# Patient Record
Sex: FEMALE | Race: WHITE | NOT HISPANIC OR LATINO | Employment: OTHER | URBAN - METROPOLITAN AREA
[De-identification: names, ages, dates, MRNs, and addresses within clinical notes are randomized per-mention and may not be internally consistent; named-entity substitution may affect disease eponyms.]

---

## 2017-03-06 DIAGNOSIS — Z88.9 HX OF SEASONAL ALLERGIES: ICD-10-CM

## 2017-03-06 RX ORDER — FLUTICASONE PROPIONATE 50 MCG
SPRAY, SUSPENSION (ML) NASAL
Qty: 16 G | Refills: 1 | Status: SHIPPED | OUTPATIENT
Start: 2017-03-06 | End: 2017-05-08 | Stop reason: SDUPTHER

## 2017-05-03 ENCOUNTER — OFFICE VISIT (OUTPATIENT)
Dept: INTERNAL MEDICINE | Age: 82
End: 2017-05-03

## 2017-05-03 VITALS
BODY MASS INDEX: 17.24 KG/M2 | HEIGHT: 64 IN | RESPIRATION RATE: 12 BRPM | SYSTOLIC BLOOD PRESSURE: 135 MMHG | WEIGHT: 101 LBS | HEART RATE: 70 BPM | DIASTOLIC BLOOD PRESSURE: 60 MMHG

## 2017-05-03 DIAGNOSIS — R09.89 BILATERAL CAROTID BRUITS: ICD-10-CM

## 2017-05-03 DIAGNOSIS — I10 ESSENTIAL HYPERTENSION: Primary | ICD-10-CM

## 2017-05-03 PROCEDURE — 99214 OFFICE O/P EST MOD 30 MIN: CPT | Performed by: INTERNAL MEDICINE

## 2017-05-04 LAB
ALBUMIN SERPL-MCNC: 4.5 G/DL (ref 3.5–5.2)
ALBUMIN/GLOB SERPL: 1.8 G/DL
ALP SERPL-CCNC: 68 U/L (ref 39–117)
ALT SERPL-CCNC: 24 U/L (ref 1–33)
AST SERPL-CCNC: 27 U/L (ref 1–32)
BILIRUB SERPL-MCNC: 0.5 MG/DL (ref 0.1–1.2)
BUN SERPL-MCNC: 21 MG/DL (ref 8–23)
BUN/CREAT SERPL: 27.6 (ref 7–25)
CALCIUM SERPL-MCNC: 9.4 MG/DL (ref 8.6–10.5)
CHLORIDE SERPL-SCNC: 92 MMOL/L (ref 98–107)
CHOLEST SERPL-MCNC: 204 MG/DL (ref 0–200)
CO2 SERPL-SCNC: 26.7 MMOL/L (ref 22–29)
CREAT SERPL-MCNC: 0.76 MG/DL (ref 0.57–1)
GLOBULIN SER CALC-MCNC: 2.5 GM/DL
GLUCOSE SERPL-MCNC: 116 MG/DL (ref 65–99)
HDLC SERPL-MCNC: 157 MG/DL (ref 40–60)
LDLC SERPL CALC-MCNC: 40 MG/DL (ref 0–100)
POTASSIUM SERPL-SCNC: 4.7 MMOL/L (ref 3.5–5.2)
PROT SERPL-MCNC: 7 G/DL (ref 6–8.5)
SODIUM SERPL-SCNC: 131 MMOL/L (ref 136–145)
TRIGL SERPL-MCNC: 37 MG/DL (ref 0–150)
VLDLC SERPL CALC-MCNC: 7.4 MG/DL (ref 5–40)

## 2017-05-08 DIAGNOSIS — R06.09 DYSPNEA ON EXERTION: ICD-10-CM

## 2017-05-08 DIAGNOSIS — I10 HTN (HYPERTENSION), BENIGN: ICD-10-CM

## 2017-05-08 DIAGNOSIS — Z88.9 HX OF SEASONAL ALLERGIES: ICD-10-CM

## 2017-05-08 RX ORDER — MONTELUKAST SODIUM 10 MG/1
TABLET ORAL
Qty: 30 TABLET | Refills: 4 | Status: SHIPPED | OUTPATIENT
Start: 2017-05-08 | End: 2017-10-23 | Stop reason: SDUPTHER

## 2017-05-08 RX ORDER — AMLODIPINE BESYLATE 5 MG/1
TABLET ORAL
Qty: 30 TABLET | Refills: 4 | Status: SHIPPED | OUTPATIENT
Start: 2017-05-08 | End: 2017-10-23 | Stop reason: SDUPTHER

## 2017-05-08 RX ORDER — LISINOPRIL 20 MG/1
TABLET ORAL
Qty: 30 TABLET | Refills: 4 | Status: SHIPPED | OUTPATIENT
Start: 2017-05-08 | End: 2017-10-23 | Stop reason: SDUPTHER

## 2017-05-08 RX ORDER — FLUTICASONE PROPIONATE 50 MCG
SPRAY, SUSPENSION (ML) NASAL
Qty: 1 BOTTLE | Refills: 4 | Status: SHIPPED | OUTPATIENT
Start: 2017-05-08 | End: 2017-10-23 | Stop reason: SDUPTHER

## 2017-05-11 ENCOUNTER — HOSPITAL ENCOUNTER (OUTPATIENT)
Dept: CARDIOLOGY | Facility: HOSPITAL | Age: 82
Discharge: HOME | End: 2017-05-11
Admitting: INTERNAL MEDICINE

## 2017-05-11 LAB
BH CV XLRA MEAS LEFT BULB EDV: -19 CM/SEC
BH CV XLRA MEAS LEFT BULB PSV: -57.8 CM/SEC
BH CV XLRA MEAS LEFT DIST CCA EDV: 18 CM/SEC
BH CV XLRA MEAS LEFT DIST CCA PSV: 69.2 CM/SEC
BH CV XLRA MEAS LEFT DIST ICA EDV: 29.4 CM/SEC
BH CV XLRA MEAS LEFT DIST ICA PSV: 101 CM/SEC
BH CV XLRA MEAS LEFT ICA/CCA RATIO: 1.45
BH CV XLRA MEAS LEFT MID ICA EDV: -27.5 CM/SEC
BH CV XLRA MEAS LEFT MID ICA PSV: -91 CM/SEC
BH CV XLRA MEAS LEFT PROX CCA EDV: 14.2 CM/SEC
BH CV XLRA MEAS LEFT PROX CCA PSV: 72.1 CM/SEC
BH CV XLRA MEAS LEFT PROX ECA EDV: -12.3 CM/SEC
BH CV XLRA MEAS LEFT PROX ECA PSV: -77.8 CM/SEC
BH CV XLRA MEAS LEFT PROX ICA EDV: -24.7 CM/SEC
BH CV XLRA MEAS LEFT PROX ICA PSV: -79.7 CM/SEC
BH CV XLRA MEAS LEFT PROX SCLA PSV: 171 CM/SEC
BH CV XLRA MEAS LEFT VERTEBRAL A EDV: 11.4 CM/SEC
BH CV XLRA MEAS LEFT VERTEBRAL A PSV: 64.5 CM/SEC
BH CV XLRA MEAS RIGHT BULB EDV: -36 CM/SEC
BH CV XLRA MEAS RIGHT BULB PSV: -129 CM/SEC
BH CV XLRA MEAS RIGHT CCA RATIO VEL: -61.2 CM/SEC
BH CV XLRA MEAS RIGHT DIST CCA EDV: -11.9 CM/SEC
BH CV XLRA MEAS RIGHT DIST CCA PSV: -61.2 CM/SEC
BH CV XLRA MEAS RIGHT DIST ICA EDV: -22.5 CM/SEC
BH CV XLRA MEAS RIGHT DIST ICA PSV: -86.5 CM/SEC
BH CV XLRA MEAS RIGHT ICA RATIO VEL: -264 CM/SEC
BH CV XLRA MEAS RIGHT ICA/CCA RATIO: 4.3
BH CV XLRA MEAS RIGHT MID ICA EDV: -16.1 CM/SEC
BH CV XLRA MEAS RIGHT MID ICA PSV: -93.9 CM/SEC
BH CV XLRA MEAS RIGHT PROX CCA EDV: 10.4 CM/SEC
BH CV XLRA MEAS RIGHT PROX CCA PSV: 52.2 CM/SEC
BH CV XLRA MEAS RIGHT PROX ECA EDV: -9.9 CM/SEC
BH CV XLRA MEAS RIGHT PROX ECA PSV: -82.1 CM/SEC
BH CV XLRA MEAS RIGHT PROX ICA EDV: -61.6 CM/SEC
BH CV XLRA MEAS RIGHT PROX ICA PSV: -264 CM/SEC
BH CV XLRA MEAS RIGHT PROX SCLA PSV: 82.7 CM/SEC
BH CV XLRA MEAS RIGHT VERTEBRAL A EDV: 11.3 CM/SEC
BH CV XLRA MEAS RIGHT VERTEBRAL A PSV: 55.9 CM/SEC
LEFT ARM BP: NORMAL MMHG
RIGHT ARM BP: NORMAL MMHG

## 2017-05-11 PROCEDURE — 93880 EXTRACRANIAL BILAT STUDY: CPT

## 2017-05-12 ENCOUNTER — TELEPHONE (OUTPATIENT)
Dept: INTERNAL MEDICINE | Age: 82
End: 2017-05-12

## 2017-05-12 DIAGNOSIS — I65.21 STENOSIS OF RIGHT CAROTID ARTERY: Primary | ICD-10-CM

## 2017-06-07 ENCOUNTER — HOSPITAL ENCOUNTER (EMERGENCY)
Facility: HOSPITAL | Age: 82
Discharge: HOME | End: 2017-06-07
Attending: FAMILY MEDICINE | Admitting: FAMILY MEDICINE

## 2017-06-07 ENCOUNTER — APPOINTMENT (OUTPATIENT)
Dept: GENERAL RADIOLOGY | Facility: HOSPITAL | Age: 82
End: 2017-06-07

## 2017-06-07 VITALS
RESPIRATION RATE: 16 BRPM | SYSTOLIC BLOOD PRESSURE: 130 MMHG | DIASTOLIC BLOOD PRESSURE: 78 MMHG | HEIGHT: 64 IN | WEIGHT: 104 LBS | BODY MASS INDEX: 17.75 KG/M2 | TEMPERATURE: 98.5 F | OXYGEN SATURATION: 96 % | HEART RATE: 70 BPM

## 2017-06-07 DIAGNOSIS — R53.1 WEAKNESS: Primary | ICD-10-CM

## 2017-06-07 LAB
ALBUMIN SERPL-MCNC: 4.4 G/DL (ref 3.5–5.2)
ALBUMIN/GLOB SERPL: 1.6 G/DL
ALP SERPL-CCNC: 63 U/L (ref 39–117)
ALT SERPL W P-5'-P-CCNC: 31 U/L (ref 1–33)
ANION GAP SERPL CALCULATED.3IONS-SCNC: 14.8 MMOL/L
AST SERPL-CCNC: 29 U/L (ref 1–32)
BACTERIA UR QL AUTO: NORMAL /HPF
BASOPHILS # BLD AUTO: 0.02 10*3/MM3 (ref 0–0.2)
BASOPHILS NFR BLD AUTO: 0.2 % (ref 0–1.5)
BILIRUB SERPL-MCNC: 0.3 MG/DL (ref 0.1–1.2)
BILIRUB UR QL STRIP: NEGATIVE
BUN BLD-MCNC: 22 MG/DL (ref 8–23)
BUN/CREAT SERPL: 26.5 (ref 7–25)
CALCIUM SPEC-SCNC: 9.5 MG/DL (ref 8.6–10.5)
CHLORIDE SERPL-SCNC: 91 MMOL/L (ref 98–107)
CLARITY UR: CLEAR
CO2 SERPL-SCNC: 24.2 MMOL/L (ref 22–29)
COLOR UR: YELLOW
CREAT BLD-MCNC: 0.83 MG/DL (ref 0.57–1)
DEPRECATED RDW RBC AUTO: 44.6 FL (ref 37–54)
EOSINOPHIL # BLD AUTO: 0.06 10*3/MM3 (ref 0–0.7)
EOSINOPHIL NFR BLD AUTO: 0.7 % (ref 0.3–6.2)
ERYTHROCYTE [DISTWIDTH] IN BLOOD BY AUTOMATED COUNT: 12.8 % (ref 11.7–13)
GFR SERPL CREATININE-BSD FRML MDRD: 65 ML/MIN/1.73
GLOBULIN UR ELPH-MCNC: 2.8 GM/DL
GLUCOSE BLD-MCNC: 97 MG/DL (ref 65–99)
GLUCOSE UR STRIP-MCNC: NEGATIVE MG/DL
HCT VFR BLD AUTO: 40.8 % (ref 35.6–45.5)
HGB BLD-MCNC: 14.4 G/DL (ref 11.9–15.5)
HGB UR QL STRIP.AUTO: NEGATIVE
HOLD SPECIMEN: NORMAL
HYALINE CASTS UR QL AUTO: NORMAL /LPF
IMM GRANULOCYTES # BLD: 0 10*3/MM3 (ref 0–0.03)
IMM GRANULOCYTES NFR BLD: 0 % (ref 0–0.5)
KETONES UR QL STRIP: NEGATIVE
LEUKOCYTE ESTERASE UR QL STRIP.AUTO: ABNORMAL
LYMPHOCYTES # BLD AUTO: 2.12 10*3/MM3 (ref 0.9–4.8)
LYMPHOCYTES NFR BLD AUTO: 24.9 % (ref 19.6–45.3)
MAGNESIUM SERPL-MCNC: 2.4 MG/DL (ref 1.6–2.4)
MCH RBC QN AUTO: 34 PG (ref 26.9–32)
MCHC RBC AUTO-ENTMCNC: 35.3 G/DL (ref 32.4–36.3)
MCV RBC AUTO: 96.2 FL (ref 80.5–98.2)
MONOCYTES # BLD AUTO: 0.51 10*3/MM3 (ref 0.2–1.2)
MONOCYTES NFR BLD AUTO: 6 % (ref 5–12)
NEUTROPHILS # BLD AUTO: 5.82 10*3/MM3 (ref 1.9–8.1)
NEUTROPHILS NFR BLD AUTO: 68.2 % (ref 42.7–76)
NITRITE UR QL STRIP: NEGATIVE
PH UR STRIP.AUTO: 7 [PH] (ref 5–8)
PLATELET # BLD AUTO: 326 10*3/MM3 (ref 140–500)
PMV BLD AUTO: 9.4 FL (ref 6–12)
POTASSIUM BLD-SCNC: 4.4 MMOL/L (ref 3.5–5.2)
PROT SERPL-MCNC: 7.2 G/DL (ref 6–8.5)
PROT UR QL STRIP: NEGATIVE
RBC # BLD AUTO: 4.24 10*6/MM3 (ref 3.9–5.2)
RBC # UR: NORMAL /HPF
REF LAB TEST METHOD: NORMAL
SODIUM BLD-SCNC: 130 MMOL/L (ref 136–145)
SP GR UR STRIP: 1.01 (ref 1–1.03)
SQUAMOUS #/AREA URNS HPF: NORMAL /HPF
TROPONIN T SERPL-MCNC: <0.01 NG/ML (ref 0–0.03)
UROBILINOGEN UR QL STRIP: ABNORMAL
WBC NRBC COR # BLD: 8.53 10*3/MM3 (ref 4.5–10.7)
WBC UR QL AUTO: NORMAL /HPF
WHOLE BLOOD HOLD SPECIMEN: NORMAL

## 2017-06-07 PROCEDURE — 93010 ELECTROCARDIOGRAM REPORT: CPT | Performed by: INTERNAL MEDICINE

## 2017-06-07 PROCEDURE — 80053 COMPREHEN METABOLIC PANEL: CPT | Performed by: FAMILY MEDICINE

## 2017-06-07 PROCEDURE — 93005 ELECTROCARDIOGRAM TRACING: CPT | Performed by: FAMILY MEDICINE

## 2017-06-07 PROCEDURE — 71010 HC CHEST PA OR AP: CPT

## 2017-06-07 PROCEDURE — 87086 URINE CULTURE/COLONY COUNT: CPT | Performed by: FAMILY MEDICINE

## 2017-06-07 PROCEDURE — 36415 COLL VENOUS BLD VENIPUNCTURE: CPT | Performed by: FAMILY MEDICINE

## 2017-06-07 PROCEDURE — 85025 COMPLETE CBC W/AUTO DIFF WBC: CPT | Performed by: FAMILY MEDICINE

## 2017-06-07 PROCEDURE — 99284 EMERGENCY DEPT VISIT MOD MDM: CPT

## 2017-06-07 PROCEDURE — 83735 ASSAY OF MAGNESIUM: CPT | Performed by: FAMILY MEDICINE

## 2017-06-07 PROCEDURE — 81001 URINALYSIS AUTO W/SCOPE: CPT | Performed by: FAMILY MEDICINE

## 2017-06-07 PROCEDURE — 84484 ASSAY OF TROPONIN QUANT: CPT | Performed by: FAMILY MEDICINE

## 2017-06-07 RX ORDER — SODIUM CHLORIDE 0.9 % (FLUSH) 0.9 %
10 SYRINGE (ML) INJECTION AS NEEDED
Status: DISCONTINUED | OUTPATIENT
Start: 2017-06-07 | End: 2017-06-07 | Stop reason: HOSPADM

## 2017-06-07 NOTE — ED NOTES
Pt encouraged to provide urine specimen, states that she just used the restroom prior to coming back to exam room.     Rica Dowling RN  06/07/17 0153

## 2017-06-07 NOTE — ED TRIAGE NOTES
PAtient states she has a lack of energy for about 5 days now. She states that she just hasn't felt like herself.

## 2017-06-07 NOTE — ED PROVIDER NOTES
" EMERGENCY DEPARTMENT ENCOUNTER    CHIEF COMPLAINT  Chief Complaint: Fatigue  History given by: Patient  History limited by: Nothing  Room Number: 23/23  PMD: Clay Martínez MD      HPI:  Pt is a 87 y.o. female who presents to the ED complaining of gradually worsening fatigue over the past 2-3 days and she states that she \"has a lack of energy\". Patient has recently developed tinnitus and left ear discomfort three days ago but she denies any hearing loss, focal weakness, HA, numbness, tingling, chest pain, abdominal pain, acute SOA, diaphoresis, fever, chills, nausea, vomiting or diarrhea since onset, or other stroke like symptoms. Patient had a recent right ICA US showed significant blockage but she is being followed closely by  with vascular. No other complaints at this time.      Duration:  2-3 days  Onset: Gradual  Timing: Constant  Location: Generalized  Radiation: None  Quality: Fatigued  Intensity/Severity: Mild  Progression: No Change  Associated Symptoms: Fatigue, tinnitus, ear discomfort  Aggravating Factors: Exertion  Alleviating Factors: Rest  Previous Episodes: None  Treatment before arrival: None mentioned    PAST MEDICAL HISTORY  Active Ambulatory Problems     Diagnosis Date Noted   • Breathlessness on exertion 01/17/2016   • BP (high blood pressure) 01/17/2016   • Anxiety 01/24/2016   • Long-term use of high-risk medication 04/13/2016   • Seasonal allergic rhinitis due to pollen 11/02/2016     Resolved Ambulatory Problems     Diagnosis Date Noted   • No Resolved Ambulatory Problems     Past Medical History:   Diagnosis Date   • Arthritis    • Hyperlipidemia    • Hypertension    • Methicillin resistant Staphylococcus aureus infection        PAST SURGICAL HISTORY  Past Surgical History:   Procedure Laterality Date   • ORIF PROXIMAL HUMERUS FRACTURE         FAMILY HISTORY  Family History   Problem Relation Age of Onset   • Stroke Mother    • Heart valve disorder Father    • Cancer Other      " sibling       SOCIAL HISTORY  Social History     Social History   • Marital status:      Spouse name: N/A   • Number of children: N/A   • Years of education: N/A     Occupational History   • Not on file.     Social History Main Topics   • Smoking status: Light Tobacco Smoker     Types: Cigarettes   • Smokeless tobacco: Never Used   • Alcohol use Yes      Comment: wine    • Drug use: Not on file   • Sexual activity: Not on file     Other Topics Concern   • Not on file     Social History Narrative   • No narrative on file       ALLERGIES  Codeine; Penicillins; and Sulfa antibiotics    REVIEW OF SYSTEMS  Review of Systems   Constitutional: Positive for fatigue. Negative for chills.   HENT: Positive for ear pain (Left) and tinnitus. Negative for congestion, hearing loss, rhinorrhea and sore throat.    Eyes: Negative for pain.   Respiratory: Negative for cough and shortness of breath.    Cardiovascular: Negative for chest pain, palpitations and leg swelling.   Gastrointestinal: Negative for abdominal pain, diarrhea, nausea and vomiting.   Genitourinary: Negative for difficulty urinating, dysuria, flank pain and frequency.   Musculoskeletal: Negative for myalgias, neck pain and neck stiffness.   Skin: Negative for rash.   Neurological: Negative for dizziness, speech difficulty, weakness, light-headedness, numbness and headaches.   Psychiatric/Behavioral: Negative.    All other systems reviewed and are negative.      PHYSICAL EXAM  ED Triage Vitals   Temp Heart Rate Resp BP SpO2   06/07/17 1442 06/07/17 1442 06/07/17 1442 06/07/17 1526 06/07/17 1442   98.5 °F (36.9 °C) 78 15 171/80 100 %      Temp src Heart Rate Source Patient Position BP Location FiO2 (%)   06/07/17 1442 -- -- -- --   Tympanic           Physical Exam   Constitutional: She is oriented to person, place, and time and well-developed, well-nourished, and in no distress. No distress.   HENT:   Head: Normocephalic.   Eyes: EOM are normal. Pupils are  equal, round, and reactive to light.   Neck: Normal range of motion.   Cardiovascular: Normal rate and regular rhythm.    No murmur heard.  Pulmonary/Chest: Effort normal and breath sounds normal. No respiratory distress.   Abdominal: Soft. There is no tenderness. There is no rebound and no guarding.   Musculoskeletal: Normal range of motion. She exhibits no edema.   Neurological: She is alert and oriented to person, place, and time.   Skin: Skin is warm and dry. No rash noted.   Psychiatric: Mood and affect normal.   Nursing note and vitals reviewed.      LAB RESULTS  Lab Results (last 24 hours)     Procedure Component Value Units Date/Time    CBC & Differential [[id number]] Collected:  06/07/17 1538    Specimen:  Blood Updated:  06/07/17 1609    Narrative:       The following orders were created for panel order CBC & Differential.  Procedure                               Abnormality         Status                     ---------                               -----------         ------                     CBC Auto Differential[[id number]]        Abnormal            Final result                 Please view results for these tests on the individual orders.    Comprehensive Metabolic Panel [[id number]]  (Abnormal) Collected:  06/07/17 1538    Specimen:  Blood Updated:  06/07/17 1635     Glucose 97 mg/dL      BUN 22 mg/dL      Creatinine 0.83 mg/dL      Sodium 130 (L) mmol/L      Potassium 4.4 mmol/L      Chloride 91 (L) mmol/L      CO2 24.2 mmol/L      Calcium 9.5 mg/dL      Total Protein 7.2 g/dL      Albumin 4.40 g/dL      ALT (SGPT) 31 U/L      AST (SGOT) 29 U/L      Alkaline Phosphatase 63 U/L      Total Bilirubin 0.3 mg/dL      eGFR Non African Amer 65 mL/min/1.73      Globulin 2.8 gm/dL      A/G Ratio 1.6 g/dL      BUN/Creatinine Ratio 26.5 (H)     Anion Gap 14.8 mmol/L     Narrative:       The MDRD GFR formula is only valid for adults with stable renal function between ages 18 and 70.    Troponin [[id number]]   (Normal) Collected:  06/07/17 1538    Specimen:  Blood Updated:  06/07/17 1635     Troponin T <0.010 ng/mL     Narrative:       Troponin T Reference Ranges:  Less than 0.03 ng/mL:    Negative for AMI  0.03 to 0.09 ng/mL:      Indeterminant for AMI  Greater than 0.09 ng/mL: Positive for AMI    Magnesium [[id number]]  (Normal) Collected:  06/07/17 1538    Specimen:  Blood Updated:  06/07/17 1635     Magnesium 2.4 mg/dL     CBC Auto Differential [[id number]]  (Abnormal) Collected:  06/07/17 1538    Specimen:  Blood Updated:  06/07/17 1609     WBC 8.53 10*3/mm3      RBC 4.24 10*6/mm3      Hemoglobin 14.4 g/dL      Hematocrit 40.8 %      MCV 96.2 fL      MCH 34.0 (H) pg      MCHC 35.3 g/dL      RDW 12.8 %      RDW-SD 44.6 fl      MPV 9.4 fL      Platelets 326 10*3/mm3      Neutrophil % 68.2 %      Lymphocyte % 24.9 %      Monocyte % 6.0 %      Eosinophil % 0.7 %      Basophil % 0.2 %      Immature Grans % 0.0 %      Neutrophils, Absolute 5.82 10*3/mm3      Lymphocytes, Absolute 2.12 10*3/mm3      Monocytes, Absolute 0.51 10*3/mm3      Eosinophils, Absolute 0.06 10*3/mm3      Basophils, Absolute 0.02 10*3/mm3      Immature Grans, Absolute 0.00 10*3/mm3     Urinalysis With / Culture If Indicated [[id number]]  (Abnormal) Collected:  06/07/17 1809    Specimen:  Urine from Urine, Clean Catch Updated:  06/07/17 1836     Color, UA Yellow     Appearance, UA Clear     pH, UA 7.0     Specific Gravity, UA 1.010     Glucose, UA Negative     Ketones, UA Negative     Bilirubin, UA Negative     Blood, UA Negative     Protein, UA Negative     Leuk Esterase, UA Small (1+) (A)     Nitrite, UA Negative     Urobilinogen, UA 0.2 E.U./dL    Urinalysis, Microscopic Only [[id number]] Collected:  06/07/17 1809    Specimen:  Urine from Urine, Clean Catch Updated:  06/07/17 1836     RBC, UA 0-2 /HPF      WBC, UA 0-2 /HPF      Bacteria, UA None Seen /HPF      Squamous Epithelial Cells, UA 0-2 /HPF      Hyaline Casts, UA 0-2 /LPF      Methodology  Automated Microscopy    Urine Culture [[id number]] Collected:  06/07/17 1809    Specimen:  Urine from Urine, Clean Catch Updated:  06/07/17 1832          I ordered the above labs and reviewed the results    RADIOLOGY  XR Chest 1 View   Final Result   No focal pulmonary consolidation. COPD change. Follow-up as   clinical indications persist.       This report was finalized on 6/7/2017 5:35 PM by Dr. Clay Blum MD.             I ordered the above noted radiological studies. Interpreted by radiologist. Reviewed by me in PACS.       PROCEDURES  Procedures    EKG           EKG time: 17:00  Rhythm/Rate: NSR 75  P waves and WI: Normal  QRS, axis: IVCD  ST and T waves: Non specific ST-T wave changes        Interpreted Contemporaneously by me, independently viewed  Unchanged compared to prior 6/15/10      PROGRESS AND CONSULTS  ED Course     15:27  Ordered EKG, labs and CXR for further evaluation.     19:26  Rechecked patient and they are resting comfortably. Discussed the patient's pertinent labs and imaging results, including CXR, EKG and labs all show nothing acute. Discussed plan to discharge the patient home and recommended f/u with her PCP as directed. Patient agrees with the plan and all questions were addressed.     Latest vital signs   BP- 157/87 HR- 84 Temp- 98.5 °F (36.9 °C) (Tympanic) O2 sat- 97%\      MEDICAL DECISION MAKING  Results were reviewed/discussed with the patient and they were also made aware of online access. Pt also made aware that some labs, such as cultures, will not be resulted during ER visit and follow up with PMD is necessary.     MDM  Number of Diagnoses or Management Options     Amount and/or Complexity of Data Reviewed  Clinical lab tests: reviewed and ordered (Troponin <0.010)  Tests in the radiology section of CPT®: reviewed and ordered (CXR - No focal pulmonary consolidation. COPD change. Follow-up as clinical indications persist.)  Tests in the medicine section of CPT®: ordered  and reviewed (See EKG)  Decide to obtain previous medical records or to obtain history from someone other than the patient: yes  Review and summarize past medical records: yes (Review of Doppler right proximal ICA 70-79% stenosis. )    Patient Progress  Patient progress: stable         DIAGNOSIS  Final diagnoses:   Weakness       DISPOSITION  DISCHARGE    Patient discharged in stable condition.    Reviewed implications of results, diagnosis, meds, responsibility to follow up, warning signs and symptoms of possible worsening, potential complications and reasons to return to ER, including worsening fatigue.     Patient/Family voiced understanding of above instructions.    Discussed plan for discharge, as there is no emergent indication for admission.  Pt/family is agreeable and understands need for follow up and repeat testing.  Pt is aware that discharge does not mean that nothing is wrong but it indicates no emergency is present that requires admission and they must continue care with follow-up as given below or physician of their choice.     FOLLOW-UP  lCay Martínez MD  [address] [id number] [phone]      As needed    Gurjit Ramirez MD  [address] [address]  [phone]      Keep your appt with Dr Ramirez         Medication List      Notice     No changes were made to your prescriptions during this visit.            Latest Documented Vital Signs:  As of 7:27 PM  BP- 157/87 HR- 84 Temp- 98.5 °F (36.9 °C) (Tympanic) O2 sat- 97%    --  Documentation assistance provided by racquel Rosas for .  Information recorded by the scribshira was done at my direction and has been verified and validated by me.        Melecio Rosas  06/07/17 1928       Abhinav Jessica MD  06/07/17 2050

## 2017-06-09 ENCOUNTER — TELEPHONE (OUTPATIENT)
Dept: SOCIAL WORK | Facility: HOSPITAL | Age: 82
End: 2017-06-09

## 2017-06-09 LAB — BACTERIA SPEC AEROBE CULT: NORMAL

## 2017-06-15 ENCOUNTER — TELEPHONE (OUTPATIENT)
Dept: INTERNAL MEDICINE | Age: 82
End: 2017-06-15

## 2017-06-15 NOTE — TELEPHONE ENCOUNTER
Pt is requesting who  recommends to use for hearing loss. Pt is having difficulty hearing out her LT ear.    Pls call pt #[phone].

## 2017-07-03 ENCOUNTER — TELEPHONE (OUTPATIENT)
Dept: INTERNAL MEDICINE | Age: 82
End: 2017-07-03

## 2017-07-03 DIAGNOSIS — H91.90 HEARING LOSS, UNSPECIFIED LATERALITY: Primary | ICD-10-CM

## 2017-07-03 NOTE — TELEPHONE ENCOUNTER
Pt called to check on a referral to an ENT for hearing loss, but the message from 6-19-17 never had a referral placed so pt could be scheduled.  Pt requested a referral to Dr. Li/ ENT  Thanks SP

## 2017-10-23 DIAGNOSIS — Z88.9 HX OF SEASONAL ALLERGIES: ICD-10-CM

## 2017-10-23 DIAGNOSIS — R06.09 DYSPNEA ON EXERTION: ICD-10-CM

## 2017-10-23 DIAGNOSIS — I10 HTN (HYPERTENSION), BENIGN: ICD-10-CM

## 2017-10-24 RX ORDER — LISINOPRIL 20 MG/1
TABLET ORAL
Qty: 30 TABLET | Refills: 3 | Status: SHIPPED | OUTPATIENT
Start: 2017-10-24 | End: 2018-02-19 | Stop reason: SDUPTHER

## 2017-10-24 RX ORDER — AMLODIPINE BESYLATE 5 MG/1
TABLET ORAL
Qty: 30 TABLET | Refills: 3 | Status: SHIPPED | OUTPATIENT
Start: 2017-10-24 | End: 2018-02-19 | Stop reason: SDUPTHER

## 2017-10-24 RX ORDER — MONTELUKAST SODIUM 10 MG/1
TABLET ORAL
Qty: 30 TABLET | Refills: 3 | Status: SHIPPED | OUTPATIENT
Start: 2017-10-24 | End: 2018-02-19 | Stop reason: SDUPTHER

## 2017-10-24 RX ORDER — FLUTICASONE PROPIONATE 50 MCG
SPRAY, SUSPENSION (ML) NASAL
Qty: 16 ML | Refills: 3 | Status: SHIPPED | OUTPATIENT
Start: 2017-10-24 | End: 2018-02-19 | Stop reason: SDUPTHER

## 2017-11-15 ENCOUNTER — OFFICE VISIT (OUTPATIENT)
Dept: INTERNAL MEDICINE | Age: 82
End: 2017-11-15

## 2017-11-15 VITALS
HEART RATE: 82 BPM | RESPIRATION RATE: 12 BRPM | BODY MASS INDEX: 17.75 KG/M2 | HEIGHT: 64 IN | SYSTOLIC BLOOD PRESSURE: 160 MMHG | TEMPERATURE: 97.6 F | WEIGHT: 104 LBS | DIASTOLIC BLOOD PRESSURE: 80 MMHG | OXYGEN SATURATION: 96 %

## 2017-11-15 DIAGNOSIS — E78.2 MIXED HYPERLIPIDEMIA: ICD-10-CM

## 2017-11-15 DIAGNOSIS — I10 ESSENTIAL HYPERTENSION: ICD-10-CM

## 2017-11-15 PROBLEM — I65.23 CAROTID STENOSIS, ASYMPTOMATIC, BILATERAL: Status: ACTIVE | Noted: 2017-11-15

## 2017-11-15 PROCEDURE — 99214 OFFICE O/P EST MOD 30 MIN: CPT | Performed by: INTERNAL MEDICINE

## 2017-11-15 RX ORDER — ASPIRIN 81 MG/1
81 TABLET ORAL DAILY
COMMUNITY

## 2017-11-15 NOTE — PROGRESS NOTES
Sharonda Ridley is a 87 y.o. female who presents with   Chief Complaint   Patient presents with   • Hypertension     Checkup; lab updates.  Blood pressure at home 130-145/60-75   • Hyperlipidemia     Checkup; lab updates.   .    Hypertension   This is a chronic problem. The current episode started more than 1 year ago. The problem is controlled. Past treatments include ACE inhibitors and calcium channel blockers. The current treatment provides moderate improvement. There are no compliance problems.    Hyperlipidemia   This is a chronic problem. The current episode started more than 1 year ago. The problem is controlled. Recent lipid tests were reviewed and are high. She is currently on no antihyperlipidemic treatment.        The following portions of the patient's history were reviewed and updated as appropriate: allergies, current medications, past medical history and problem list.    Review of Systems   Constitutional: Negative.    HENT: Negative.    Eyes: Negative.    Respiratory: Negative.    Cardiovascular: Negative.    Genitourinary: Negative.    Musculoskeletal: Negative.    Skin: Negative.    Neurological: Negative.    Psychiatric/Behavioral: Negative.        Objective   Physical Exam   Constitutional: She is oriented to person, place, and time. She appears well-developed and well-nourished. No distress.   HENT:   Head: Normocephalic and atraumatic.   Eyes: Conjunctivae and EOM are normal. Pupils are equal, round, and reactive to light.   Neck: Normal range of motion. Neck supple. No thyromegaly present.   Bilateral carotid bruits are noted.  Patient does have a known history of bilateral carotid stenosis and sees  for vascular follow-up.  He has felt that aspirin 81 mg daily is the current best treatment for the issue.    Cardiovascular: Normal rate, regular rhythm, normal heart sounds and intact distal pulses.  Exam reveals no gallop and no friction rub.    No murmur heard.  Pulmonary/Chest:  Effort normal and breath sounds normal. No respiratory distress. She has no wheezes. She has no rales. She exhibits no tenderness.   Neurological: She is alert and oriented to person, place, and time.   Psychiatric: She has a normal mood and affect. Her behavior is normal. Judgment and thought content normal.   Nursing note and vitals reviewed.      Assessment/Plan   Sharonda was seen today for hypertension and hyperlipidemia.    Diagnoses and all orders for this visit:    Essential hypertension  -     Comprehensive Metabolic Panel    Mixed hyperlipidemia  -     Comprehensive Metabolic Panel  -     Lipid Panel          Plan: Labs as above.Today's exam unremarkable for any new problems or events.  Continue all current treatment as prescribed.  A follow-up checkup/lab update visit is advised for 4 months because of the elevation of the blood pressure on today's visit..    Patient declines to be tested for any thyroid abnormalities.

## 2017-11-16 LAB
ALBUMIN SERPL-MCNC: 4.5 G/DL (ref 3.5–5.2)
ALBUMIN/GLOB SERPL: 1.9 G/DL
ALP SERPL-CCNC: 65 U/L (ref 39–117)
ALT SERPL-CCNC: 26 U/L (ref 1–33)
AST SERPL-CCNC: 31 U/L (ref 1–32)
BILIRUB SERPL-MCNC: 0.4 MG/DL (ref 0.1–1.2)
BUN SERPL-MCNC: 21 MG/DL (ref 8–23)
BUN/CREAT SERPL: 29.6 (ref 7–25)
CALCIUM SERPL-MCNC: 9.6 MG/DL (ref 8.6–10.5)
CHLORIDE SERPL-SCNC: 91 MMOL/L (ref 98–107)
CHOLEST SERPL-MCNC: 194 MG/DL (ref 0–200)
CO2 SERPL-SCNC: 27.6 MMOL/L (ref 22–29)
CREAT SERPL-MCNC: 0.71 MG/DL (ref 0.57–1)
GFR SERPLBLD CREATININE-BSD FMLA CKD-EPI: 78 ML/MIN/1.73
GFR SERPLBLD CREATININE-BSD FMLA CKD-EPI: 94 ML/MIN/1.73
GLOBULIN SER CALC-MCNC: 2.4 GM/DL
GLUCOSE SERPL-MCNC: 111 MG/DL (ref 65–99)
HDLC SERPL-MCNC: 145 MG/DL (ref 40–60)
LDLC SERPL CALC-MCNC: 43 MG/DL (ref 0–100)
POTASSIUM SERPL-SCNC: 4.8 MMOL/L (ref 3.5–5.2)
PROT SERPL-MCNC: 6.9 G/DL (ref 6–8.5)
SODIUM SERPL-SCNC: 131 MMOL/L (ref 136–145)
TRIGL SERPL-MCNC: 32 MG/DL (ref 0–150)
VLDLC SERPL CALC-MCNC: 6.4 MG/DL (ref 5–40)

## 2018-02-19 DIAGNOSIS — R06.09 DYSPNEA ON EXERTION: ICD-10-CM

## 2018-02-19 DIAGNOSIS — Z88.9 HX OF SEASONAL ALLERGIES: ICD-10-CM

## 2018-02-19 DIAGNOSIS — I10 HTN (HYPERTENSION), BENIGN: ICD-10-CM

## 2018-02-19 RX ORDER — FLUTICASONE PROPIONATE 50 MCG
SPRAY, SUSPENSION (ML) NASAL
Qty: 16 G | Refills: 2 | Status: SHIPPED | OUTPATIENT
Start: 2018-02-19 | End: 2018-05-12 | Stop reason: SDUPTHER

## 2018-02-19 RX ORDER — AMLODIPINE BESYLATE 5 MG/1
TABLET ORAL
Qty: 30 TABLET | Refills: 2 | Status: SHIPPED | OUTPATIENT
Start: 2018-02-19 | End: 2018-05-12 | Stop reason: SDUPTHER

## 2018-02-19 RX ORDER — MONTELUKAST SODIUM 10 MG/1
TABLET ORAL
Qty: 30 TABLET | Refills: 2 | Status: SHIPPED | OUTPATIENT
Start: 2018-02-19 | End: 2018-05-12 | Stop reason: SDUPTHER

## 2018-02-19 RX ORDER — LISINOPRIL 20 MG/1
TABLET ORAL
Qty: 30 TABLET | Refills: 2 | Status: SHIPPED | OUTPATIENT
Start: 2018-02-19 | End: 2018-05-12 | Stop reason: SDUPTHER

## 2018-03-14 ENCOUNTER — OFFICE VISIT (OUTPATIENT)
Dept: INTERNAL MEDICINE | Age: 83
End: 2018-03-14

## 2018-03-14 VITALS
HEIGHT: 64 IN | DIASTOLIC BLOOD PRESSURE: 60 MMHG | TEMPERATURE: 97.7 F | HEART RATE: 70 BPM | OXYGEN SATURATION: 96 % | RESPIRATION RATE: 12 BRPM | WEIGHT: 107 LBS | SYSTOLIC BLOOD PRESSURE: 160 MMHG | BODY MASS INDEX: 18.27 KG/M2

## 2018-03-14 DIAGNOSIS — E78.2 MIXED HYPERLIPIDEMIA: ICD-10-CM

## 2018-03-14 DIAGNOSIS — I10 ESSENTIAL HYPERTENSION: Primary | ICD-10-CM

## 2018-03-14 DIAGNOSIS — R73.9 HYPERGLYCEMIA: ICD-10-CM

## 2018-03-14 PROCEDURE — 99214 OFFICE O/P EST MOD 30 MIN: CPT | Performed by: INTERNAL MEDICINE

## 2018-03-14 NOTE — PROGRESS NOTES
Sharonda Ridley is a 88 y.o. female who presents with   Chief Complaint   Patient presents with   • Hyperlipidemia   • Blood Sugar Problem   • Hypertension   .    88-year-old female presents for her routine checkup/lab update visit.  She is not fasting but is 2 hours postprandial.  Her blood pressures at home since her last visit in November have ranged between 130-140/60-70.      Hyperlipidemia   This is a chronic problem. The current episode started more than 1 year ago. The problem is controlled. Exacerbating diseases include diabetes. She is currently on no antihyperlipidemic treatment.   Blood Sugar Problem   This is a chronic problem. The current episode started more than 1 month ago. The problem has been waxing and waning. She has tried nothing for the symptoms.   Hypertension   This is a chronic problem. The current episode started more than 1 month ago. The problem is controlled. Past treatments include calcium channel blockers and ACE inhibitors. There are no compliance problems.  Current antihypertension treatment includes calcium channel blockers and ACE inhibitors.        The following portions of the patient's history were reviewed and updated as appropriate: allergies, current medications, past medical history and problem list.    Review of Systems   Constitutional: Negative.    HENT: Negative.    Eyes: Negative.    Respiratory: Negative.    Cardiovascular: Negative.    Genitourinary: Negative.    Musculoskeletal: Negative.    Skin: Negative.    Neurological: Negative.    Psychiatric/Behavioral: Negative.        Objective   Physical Exam   Constitutional: She is oriented to person, place, and time. She appears well-developed and well-nourished. No distress.   HENT:   Head: Normocephalic and atraumatic.   Eyes: Conjunctivae and EOM are normal. Pupils are equal, round, and reactive to light.   Neck: Normal range of motion. Neck supple. No thyromegaly present.   Neck exam negative.  Carotid  auscultation normal-no bruits heard.   Cardiovascular: Normal rate, regular rhythm, normal heart sounds and intact distal pulses.  Exam reveals no gallop and no friction rub.    No murmur heard.  Pulmonary/Chest: Effort normal and breath sounds normal. No respiratory distress. She has no wheezes. She has no rales. She exhibits no tenderness.   Neurological: She is alert and oriented to person, place, and time.   Psychiatric: She has a normal mood and affect. Her behavior is normal. Judgment and thought content normal.   Nursing note and vitals reviewed.      Assessment/Plan   Sharonda was seen today for hyperlipidemia, blood sugar problem and hypertension.    Diagnoses and all orders for this visit:    Essential hypertension    Mixed hyperlipidemia  -     Comprehensive Metabolic Panel  -     Lipid Panel  -     TSH+Free T4    Hyperglycemia  -     Comprehensive Metabolic Panel  -     Hemoglobin A1c  -     TSH+Free T4      Plan: Labs as above.Today's exam unremarkable for any new problems or events.  Continue all current treatment as prescribed.  A follow-up checkup/lab update visit is advised for 6 months assuming today's labs are all acceptable.

## 2018-03-15 LAB
ALBUMIN SERPL-MCNC: 4.2 G/DL (ref 3.5–5.2)
ALBUMIN/GLOB SERPL: 1.6 G/DL
ALP SERPL-CCNC: 61 U/L (ref 39–117)
ALT SERPL-CCNC: 25 U/L (ref 1–33)
AST SERPL-CCNC: 27 U/L (ref 1–32)
BILIRUB SERPL-MCNC: 0.4 MG/DL (ref 0.1–1.2)
BUN SERPL-MCNC: 25 MG/DL (ref 8–23)
BUN/CREAT SERPL: 32.5 (ref 7–25)
CALCIUM SERPL-MCNC: 9.2 MG/DL (ref 8.6–10.5)
CHLORIDE SERPL-SCNC: 94 MMOL/L (ref 98–107)
CHOLEST SERPL-MCNC: 194 MG/DL (ref 0–200)
CO2 SERPL-SCNC: 27.8 MMOL/L (ref 22–29)
CREAT SERPL-MCNC: 0.77 MG/DL (ref 0.57–1)
GFR SERPLBLD CREATININE-BSD FMLA CKD-EPI: 71 ML/MIN/1.73
GFR SERPLBLD CREATININE-BSD FMLA CKD-EPI: 86 ML/MIN/1.73
GLOBULIN SER CALC-MCNC: 2.7 GM/DL
GLUCOSE SERPL-MCNC: 87 MG/DL (ref 65–99)
HBA1C MFR BLD: 5.1 % (ref 4.8–5.6)
HDLC SERPL-MCNC: 136 MG/DL (ref 40–60)
LDLC SERPL CALC-MCNC: 51 MG/DL (ref 0–100)
POTASSIUM SERPL-SCNC: 5.1 MMOL/L (ref 3.5–5.2)
PROT SERPL-MCNC: 6.9 G/DL (ref 6–8.5)
SODIUM SERPL-SCNC: 134 MMOL/L (ref 136–145)
T4 FREE SERPL-MCNC: 1.18 NG/DL (ref 0.93–1.7)
TRIGL SERPL-MCNC: 34 MG/DL (ref 0–150)
TSH SERPL DL<=0.005 MIU/L-ACNC: 0.86 MIU/ML (ref 0.27–4.2)
VLDLC SERPL CALC-MCNC: 6.8 MG/DL (ref 5–40)

## 2018-05-12 DIAGNOSIS — R06.09 DYSPNEA ON EXERTION: ICD-10-CM

## 2018-05-12 DIAGNOSIS — Z88.9 HX OF SEASONAL ALLERGIES: ICD-10-CM

## 2018-05-12 DIAGNOSIS — I10 HTN (HYPERTENSION), BENIGN: ICD-10-CM

## 2018-05-14 RX ORDER — MONTELUKAST SODIUM 10 MG/1
TABLET ORAL
Qty: 30 TABLET | Refills: 1 | Status: SHIPPED | OUTPATIENT
Start: 2018-05-14 | End: 2018-07-07 | Stop reason: SDUPTHER

## 2018-05-14 RX ORDER — FLUTICASONE PROPIONATE 50 MCG
SPRAY, SUSPENSION (ML) NASAL
Qty: 16 ML | Refills: 1 | Status: SHIPPED | OUTPATIENT
Start: 2018-05-14 | End: 2018-07-07 | Stop reason: SDUPTHER

## 2018-05-14 RX ORDER — AMLODIPINE BESYLATE 5 MG/1
TABLET ORAL
Qty: 30 TABLET | Refills: 1 | Status: SHIPPED | OUTPATIENT
Start: 2018-05-14 | End: 2018-07-07 | Stop reason: SDUPTHER

## 2018-05-14 RX ORDER — LISINOPRIL 20 MG/1
TABLET ORAL
Qty: 30 TABLET | Refills: 1 | Status: SHIPPED | OUTPATIENT
Start: 2018-05-14 | End: 2018-07-07 | Stop reason: SDUPTHER

## 2018-06-12 ENCOUNTER — TRANSCRIBE ORDERS (OUTPATIENT)
Dept: ADMINISTRATIVE | Facility: HOSPITAL | Age: 83
End: 2018-06-12

## 2018-06-12 DIAGNOSIS — I65.23 CAROTID STENOSIS, BILATERAL: Primary | ICD-10-CM

## 2018-06-21 ENCOUNTER — HOSPITAL ENCOUNTER (OUTPATIENT)
Dept: CT IMAGING | Facility: HOSPITAL | Age: 83
Discharge: HOME | End: 2018-06-21
Attending: SURGERY | Admitting: SURGERY

## 2018-06-21 DIAGNOSIS — I65.23 CAROTID STENOSIS, BILATERAL: ICD-10-CM

## 2018-06-21 LAB — CREAT BLDA-MCNC: 0.7 MG/DL (ref 0.6–1.3)

## 2018-06-21 PROCEDURE — 70498 CT ANGIOGRAPHY NECK: CPT

## 2018-06-21 PROCEDURE — 70496 CT ANGIOGRAPHY HEAD: CPT

## 2018-06-21 PROCEDURE — 82565 ASSAY OF CREATININE: CPT

## 2018-06-21 PROCEDURE — 25010000002 IOPAMIDOL 61 % SOLUTION: Performed by: SURGERY

## 2018-06-21 RX ADMIN — IOPAMIDOL 95 ML: 612 INJECTION, SOLUTION INTRAVENOUS at 15:30

## 2018-07-07 DIAGNOSIS — Z88.9 HX OF SEASONAL ALLERGIES: ICD-10-CM

## 2018-07-07 DIAGNOSIS — R06.09 DYSPNEA ON EXERTION: ICD-10-CM

## 2018-07-07 DIAGNOSIS — I10 HTN (HYPERTENSION), BENIGN: ICD-10-CM

## 2018-07-09 RX ORDER — FLUTICASONE PROPIONATE 50 MCG
SPRAY, SUSPENSION (ML) NASAL
Qty: 16 G | Refills: 2 | Status: SHIPPED | OUTPATIENT
Start: 2018-07-09 | End: 2018-10-01 | Stop reason: SDUPTHER

## 2018-07-09 RX ORDER — MONTELUKAST SODIUM 10 MG/1
TABLET ORAL
Qty: 30 TABLET | Refills: 2 | Status: SHIPPED | OUTPATIENT
Start: 2018-07-09 | End: 2018-10-01 | Stop reason: SDUPTHER

## 2018-07-09 RX ORDER — LISINOPRIL 20 MG/1
TABLET ORAL
Qty: 30 TABLET | Refills: 2 | Status: SHIPPED | OUTPATIENT
Start: 2018-07-09 | End: 2018-10-01 | Stop reason: SDUPTHER

## 2018-07-09 RX ORDER — AMLODIPINE BESYLATE 5 MG/1
TABLET ORAL
Qty: 30 TABLET | Refills: 2 | Status: SHIPPED | OUTPATIENT
Start: 2018-07-09 | End: 2018-10-01 | Stop reason: SDUPTHER

## 2018-09-12 ENCOUNTER — OFFICE VISIT (OUTPATIENT)
Dept: INTERNAL MEDICINE | Age: 83
End: 2018-09-12

## 2018-09-12 VITALS
TEMPERATURE: 97.3 F | WEIGHT: 105 LBS | OXYGEN SATURATION: 97 % | HEIGHT: 64 IN | HEART RATE: 80 BPM | SYSTOLIC BLOOD PRESSURE: 160 MMHG | BODY MASS INDEX: 17.93 KG/M2 | DIASTOLIC BLOOD PRESSURE: 58 MMHG | RESPIRATION RATE: 12 BRPM

## 2018-09-12 DIAGNOSIS — I10 ESSENTIAL HYPERTENSION: Primary | ICD-10-CM

## 2018-09-12 DIAGNOSIS — E78.2 MIXED HYPERLIPIDEMIA: ICD-10-CM

## 2018-09-12 DIAGNOSIS — R73.9 HYPERGLYCEMIA: ICD-10-CM

## 2018-09-12 PROCEDURE — 99214 OFFICE O/P EST MOD 30 MIN: CPT | Performed by: INTERNAL MEDICINE

## 2018-09-12 RX ORDER — ATORVASTATIN CALCIUM 10 MG/1
10 TABLET, FILM COATED ORAL DAILY
COMMUNITY
Start: 2018-09-10 | End: 2020-08-05 | Stop reason: SDUPTHER

## 2018-09-12 NOTE — PROGRESS NOTES
Sharonda Ridley is a 88 y.o. female who presents with   Chief Complaint   Patient presents with   • Hypertension     Blood pressures at home consistently in [id number]/60 range    • Hyperlipidemia   • Blood Sugar Problem   .    88-year-old female.  Six-month checkup with lab updates.  No complaints or problems on today's visit.  Her blood pressures consistently at home are within acceptable range for her age and consistently elevated in the office suggesting white coat hypertension.      Hypertension   This is a chronic problem. The current episode started more than 1 year ago. The problem is controlled. Current antihypertension treatment includes calcium channel blockers and ACE inhibitors. The current treatment provides moderate improvement. There are no compliance problems.    Hyperlipidemia   This is a chronic problem. Exacerbating diseases include diabetes. Current antihyperlipidemic treatment includes statins (Patient using Lipitor primarily for carotid artery stenosis more so than lipids.).   Blood Sugar Problem   This is a chronic problem. The current episode started more than 1 year ago. The problem has been waxing and waning. She has tried nothing for the symptoms.        The following portions of the patient's history were reviewed and updated as appropriate: allergies, current medications, past medical history and problem list.    Review of Systems   Constitutional: Negative.    HENT: Negative.    Eyes: Negative.    Respiratory: Negative.    Cardiovascular: Negative.    Genitourinary: Negative.    Musculoskeletal: Negative.    Skin: Negative.    Neurological: Negative.    Psychiatric/Behavioral: Negative.        Objective   Physical Exam   Constitutional: She is oriented to person, place, and time. She appears well-developed and well-nourished. No distress.   HENT:   Head: Normocephalic and atraumatic.   Eyes: Pupils are equal, round, and reactive to light. Conjunctivae and EOM are normal.   Neck:  Normal range of motion. Neck supple. No thyromegaly present.   Neck exam negative.  Carotid auscultation normal-no bruits heard.   Cardiovascular: Normal rate, regular rhythm, normal heart sounds and intact distal pulses.  Exam reveals no gallop and no friction rub.    No murmur heard.  Pulmonary/Chest: Effort normal and breath sounds normal. No respiratory distress. She has no wheezes. She has no rales. She exhibits no tenderness.   Neurological: She is alert and oriented to person, place, and time.   Psychiatric: She has a normal mood and affect. Her behavior is normal. Judgment and thought content normal.   Nursing note and vitals reviewed.      Assessment/Plan   Sharonda was seen today for hypertension, hyperlipidemia and blood sugar problem.    Diagnoses and all orders for this visit:    Essential hypertension  -     Comprehensive Metabolic Panel    Mixed hyperlipidemia  -     Comprehensive Metabolic Panel  -     Lipid Panel    Hyperglycemia  -     Comprehensive Metabolic Panel  -     Hemoglobin A1c      Plan: Labs as above for the issues as outlined.  The patient does not have a treatable hyperlipidemia however the Lipitor is being prescribed by her vascular surgeon who is following her for bilateral carotid stenosis.  A carotid Doppler study in June showed 80-99 percent occlusion of the right internal carotid; 15-49 percent occlusion of the left internal carotid    Continue all treatment as prescribed.  Follow-up checkup/lab updates in 6 months advised.

## 2018-09-13 LAB
ALBUMIN SERPL-MCNC: 5 G/DL (ref 3.5–5.2)
ALBUMIN/GLOB SERPL: 2.4 G/DL
ALP SERPL-CCNC: 76 U/L (ref 39–117)
ALT SERPL-CCNC: 29 U/L (ref 1–33)
AST SERPL-CCNC: 28 U/L (ref 1–32)
BILIRUB SERPL-MCNC: 0.6 MG/DL (ref 0.1–1.2)
BUN SERPL-MCNC: 18 MG/DL (ref 8–23)
BUN/CREAT SERPL: 23.7 (ref 7–25)
CALCIUM SERPL-MCNC: 9.5 MG/DL (ref 8.6–10.5)
CHLORIDE SERPL-SCNC: 93 MMOL/L (ref 98–107)
CHOLEST SERPL-MCNC: 179 MG/DL (ref 0–200)
CO2 SERPL-SCNC: 29.4 MMOL/L (ref 22–29)
CREAT SERPL-MCNC: 0.76 MG/DL (ref 0.57–1)
GLOBULIN SER CALC-MCNC: 2.1 GM/DL
GLUCOSE SERPL-MCNC: 91 MG/DL (ref 65–99)
HBA1C MFR BLD: 5.44 % (ref 4.8–5.6)
HDLC SERPL-MCNC: 144 MG/DL (ref 40–60)
LDLC SERPL CALC-MCNC: 29 MG/DL (ref 0–100)
POTASSIUM SERPL-SCNC: 4.5 MMOL/L (ref 3.5–5.2)
PROT SERPL-MCNC: 7.1 G/DL (ref 6–8.5)
SODIUM SERPL-SCNC: 133 MMOL/L (ref 136–145)
TRIGL SERPL-MCNC: 32 MG/DL (ref 0–150)
VLDLC SERPL CALC-MCNC: 6.4 MG/DL (ref 5–40)

## 2018-10-01 DIAGNOSIS — I10 HTN (HYPERTENSION), BENIGN: ICD-10-CM

## 2018-10-01 DIAGNOSIS — Z88.9 HX OF SEASONAL ALLERGIES: ICD-10-CM

## 2018-10-01 DIAGNOSIS — R06.09 DYSPNEA ON EXERTION: ICD-10-CM

## 2018-10-02 RX ORDER — FLUTICASONE PROPIONATE 50 MCG
SPRAY, SUSPENSION (ML) NASAL
Qty: 16 G | Refills: 1 | Status: SHIPPED | OUTPATIENT
Start: 2018-10-02 | End: 2018-12-01 | Stop reason: SDUPTHER

## 2018-10-02 RX ORDER — MONTELUKAST SODIUM 10 MG/1
TABLET ORAL
Qty: 30 TABLET | Refills: 1 | Status: SHIPPED | OUTPATIENT
Start: 2018-10-02 | End: 2018-12-01 | Stop reason: SDUPTHER

## 2018-10-02 RX ORDER — AMLODIPINE BESYLATE 5 MG/1
TABLET ORAL
Qty: 30 TABLET | Refills: 1 | Status: SHIPPED | OUTPATIENT
Start: 2018-10-02 | End: 2018-12-01 | Stop reason: SDUPTHER

## 2018-10-02 RX ORDER — LISINOPRIL 20 MG/1
TABLET ORAL
Qty: 30 TABLET | Refills: 1 | Status: SHIPPED | OUTPATIENT
Start: 2018-10-02 | End: 2018-12-02 | Stop reason: SDUPTHER

## 2018-12-01 DIAGNOSIS — Z88.9 HX OF SEASONAL ALLERGIES: ICD-10-CM

## 2018-12-01 DIAGNOSIS — R06.09 DYSPNEA ON EXERTION: ICD-10-CM

## 2018-12-01 DIAGNOSIS — I10 HTN (HYPERTENSION), BENIGN: ICD-10-CM

## 2018-12-02 DIAGNOSIS — I10 HTN (HYPERTENSION), BENIGN: ICD-10-CM

## 2018-12-03 RX ORDER — FLUTICASONE PROPIONATE 50 MCG
SPRAY, SUSPENSION (ML) NASAL
Qty: 16 G | Refills: 0 | Status: SHIPPED | OUTPATIENT
Start: 2018-12-03 | End: 2018-12-27 | Stop reason: SDUPTHER

## 2018-12-03 RX ORDER — LISINOPRIL 20 MG/1
TABLET ORAL
Qty: 30 TABLET | Refills: 0 | Status: SHIPPED | OUTPATIENT
Start: 2018-12-03 | End: 2018-12-27 | Stop reason: SDUPTHER

## 2018-12-03 RX ORDER — AMLODIPINE BESYLATE 5 MG/1
TABLET ORAL
Qty: 30 TABLET | Refills: 0 | Status: SHIPPED | OUTPATIENT
Start: 2018-12-03 | End: 2018-12-27 | Stop reason: SDUPTHER

## 2018-12-03 RX ORDER — MONTELUKAST SODIUM 10 MG/1
TABLET ORAL
Qty: 30 TABLET | Refills: 0 | Status: SHIPPED | OUTPATIENT
Start: 2018-12-03 | End: 2018-12-27 | Stop reason: SDUPTHER

## 2018-12-27 DIAGNOSIS — Z88.9 HX OF SEASONAL ALLERGIES: ICD-10-CM

## 2018-12-27 DIAGNOSIS — R06.09 DYSPNEA ON EXERTION: ICD-10-CM

## 2018-12-27 DIAGNOSIS — I10 HTN (HYPERTENSION), BENIGN: ICD-10-CM

## 2018-12-28 RX ORDER — LISINOPRIL 20 MG/1
TABLET ORAL
Qty: 30 TABLET | Refills: 2 | Status: SHIPPED | OUTPATIENT
Start: 2018-12-28 | End: 2019-03-29 | Stop reason: SDUPTHER

## 2018-12-28 RX ORDER — AMLODIPINE BESYLATE 5 MG/1
TABLET ORAL
Qty: 30 TABLET | Refills: 2 | Status: SHIPPED | OUTPATIENT
Start: 2018-12-28 | End: 2019-03-29 | Stop reason: SDUPTHER

## 2018-12-28 RX ORDER — FLUTICASONE PROPIONATE 50 MCG
SPRAY, SUSPENSION (ML) NASAL
Qty: 16 ML | Refills: 1 | Status: SHIPPED | OUTPATIENT
Start: 2018-12-28 | End: 2019-02-26 | Stop reason: SDUPTHER

## 2018-12-28 RX ORDER — MONTELUKAST SODIUM 10 MG/1
TABLET ORAL
Qty: 30 TABLET | Refills: 2 | Status: SHIPPED | OUTPATIENT
Start: 2018-12-28 | End: 2019-03-29 | Stop reason: SDUPTHER

## 2019-01-03 ENCOUNTER — TELEPHONE (OUTPATIENT)
Dept: INTERNAL MEDICINE | Age: 84
End: 2019-01-03

## 2019-01-03 NOTE — TELEPHONE ENCOUNTER
Pt would like to know what OTC cough syrup she can purchase that will not interact with her rx medications.    Pls advise.    Pt can be reached #[phone].

## 2019-01-16 ENCOUNTER — OFFICE VISIT (OUTPATIENT)
Dept: INTERNAL MEDICINE | Age: 84
End: 2019-01-16

## 2019-01-16 VITALS
DIASTOLIC BLOOD PRESSURE: 60 MMHG | WEIGHT: 106 LBS | HEART RATE: 72 BPM | RESPIRATION RATE: 13 BRPM | HEIGHT: 64 IN | OXYGEN SATURATION: 98 % | BODY MASS INDEX: 18.1 KG/M2 | TEMPERATURE: 98.4 F | SYSTOLIC BLOOD PRESSURE: 130 MMHG

## 2019-01-16 DIAGNOSIS — I10 ESSENTIAL HYPERTENSION: ICD-10-CM

## 2019-01-16 DIAGNOSIS — J01.10 ACUTE NON-RECURRENT FRONTAL SINUSITIS: Primary | ICD-10-CM

## 2019-01-16 PROCEDURE — 99214 OFFICE O/P EST MOD 30 MIN: CPT | Performed by: INTERNAL MEDICINE

## 2019-01-16 RX ORDER — AZITHROMYCIN 250 MG/1
TABLET, FILM COATED ORAL
Qty: 6 TABLET | Refills: 0 | Status: SHIPPED | OUTPATIENT
Start: 2019-01-16 | End: 2019-03-13

## 2019-01-16 NOTE — PROGRESS NOTES
Sharonda Ridley is a 88 y.o. female who presents with   Chief Complaint   Patient presents with   • Sinusitis     Head congestion, cough, yellow phlegm, no fever or chills, off balance   • Carotid stenosis     Bilateral-75% right; 20% left.  Patient being seen and cared for by vascular surgery for this   • Hypertension     Blood pressure at home consistently around 130/60   .    88-year-old female presents with history as outlined above.      Sinusitis   This is a new problem. The current episode started in the past 7 days. The problem is unchanged. There has been no fever. She is experiencing no pain. Associated symptoms include congestion, coughing, sinus pressure and a sore throat. Pertinent negatives include no shortness of breath. Past treatments include nothing.   Hypertension   This is a chronic problem. The current episode started more than 1 year ago. The problem has been waxing and waning since onset. The problem is controlled. Pertinent negatives include no shortness of breath. Current antihypertension treatment includes calcium channel blockers and ACE inhibitors. The current treatment provides mild improvement. There are no compliance problems.         The following portions of the patient's history were reviewed and updated as appropriate: allergies, current medications, past medical history and problem list.    Review of Systems   Constitutional: Negative.    HENT: Positive for congestion, sinus pressure and sore throat.    Eyes: Negative.    Respiratory: Positive for cough. Negative for shortness of breath and wheezing.    Cardiovascular: Negative.    Genitourinary: Negative.    Musculoskeletal: Negative.    Skin: Negative.    Neurological: Negative.    Psychiatric/Behavioral: Negative.        Objective   Physical Exam   Constitutional: She is oriented to person, place, and time. She appears well-developed and well-nourished. No distress.   HENT:   Head: Normocephalic and atraumatic.   Eyes:  Conjunctivae and EOM are normal. Pupils are equal, round, and reactive to light.   Neck: Normal range of motion. Neck supple. No thyromegaly present.   Neck showing bilateral carotid bruits on auscultation.  Known history of bilateral carotid artery disease.  Right-75%; left 10-20 percent.  Currently being seen by vascular surgery-.    Cardiovascular: Normal rate, regular rhythm, normal heart sounds and intact distal pulses. Exam reveals no gallop and no friction rub.   No murmur heard.  Pulmonary/Chest: Effort normal and breath sounds normal. No respiratory distress. She has no wheezes. She has no rales. She exhibits no tenderness.   Neurological: She is alert and oriented to person, place, and time.   Psychiatric: She has a normal mood and affect. Her behavior is normal. Judgment and thought content normal.   Nursing note and vitals reviewed.      Assessment/Plan   Sharonda was seen today for sinusitis, carotid stenosis and hypertension.    Diagnoses and all orders for this visit:    Acute non-recurrent frontal sinusitis    Essential hypertension    Other orders  -     azithromycin (ZITHROMAX Z-REESE) 250 MG tablet; Take 2 tablets the first day, then 1 tablet daily for 4 days.        Plan: Z-Reese as above.  OTC Delsym as needed.  Follow-up for today's issues as needed.    Blood pressure under good control on today's reading.  Blood pressure at home consistently acceptable suggesting probable white coat hypertension.

## 2019-02-26 DIAGNOSIS — Z88.9 HX OF SEASONAL ALLERGIES: ICD-10-CM

## 2019-02-26 RX ORDER — FLUTICASONE PROPIONATE 50 MCG
SPRAY, SUSPENSION (ML) NASAL
Qty: 16 G | Refills: 0 | Status: SHIPPED | OUTPATIENT
Start: 2019-02-26 | End: 2019-04-02 | Stop reason: SDUPTHER

## 2019-03-13 ENCOUNTER — OFFICE VISIT (OUTPATIENT)
Dept: INTERNAL MEDICINE | Age: 84
End: 2019-03-13

## 2019-03-13 VITALS
BODY MASS INDEX: 17.83 KG/M2 | RESPIRATION RATE: 12 BRPM | TEMPERATURE: 97.5 F | SYSTOLIC BLOOD PRESSURE: 134 MMHG | WEIGHT: 104.4 LBS | HEIGHT: 64 IN | DIASTOLIC BLOOD PRESSURE: 60 MMHG | HEART RATE: 73 BPM | OXYGEN SATURATION: 98 %

## 2019-03-13 DIAGNOSIS — E78.2 MIXED HYPERLIPIDEMIA: ICD-10-CM

## 2019-03-13 DIAGNOSIS — R73.9 HYPERGLYCEMIA: ICD-10-CM

## 2019-03-13 DIAGNOSIS — I10 ESSENTIAL HYPERTENSION: Primary | ICD-10-CM

## 2019-03-13 LAB
ALBUMIN SERPL-MCNC: 4.4 G/DL (ref 3.5–5.2)
ALBUMIN/GLOB SERPL: 1.9 G/DL
ALP SERPL-CCNC: 76 U/L (ref 39–117)
ALT SERPL-CCNC: 27 U/L (ref 1–33)
AST SERPL-CCNC: 31 U/L (ref 1–32)
BILIRUB SERPL-MCNC: 0.4 MG/DL (ref 0.1–1.2)
BUN SERPL-MCNC: 18 MG/DL (ref 8–23)
BUN/CREAT SERPL: 24.7 (ref 7–25)
CALCIUM SERPL-MCNC: 9.3 MG/DL (ref 8.6–10.5)
CHLORIDE SERPL-SCNC: 94 MMOL/L (ref 98–107)
CHOLEST SERPL-MCNC: 158 MG/DL (ref 0–200)
CO2 SERPL-SCNC: 25.2 MMOL/L (ref 22–29)
CREAT SERPL-MCNC: 0.73 MG/DL (ref 0.57–1)
GLOBULIN SER CALC-MCNC: 2.3 GM/DL
GLUCOSE SERPL-MCNC: 92 MG/DL (ref 65–99)
HBA1C MFR BLD: 5.2 % (ref 4.8–5.6)
HDLC SERPL-MCNC: 125 MG/DL (ref 40–60)
LDLC SERPL CALC-MCNC: 27 MG/DL (ref 0–100)
POTASSIUM SERPL-SCNC: 4.5 MMOL/L (ref 3.5–5.2)
PROT SERPL-MCNC: 6.7 G/DL (ref 6–8.5)
SODIUM SERPL-SCNC: 130 MMOL/L (ref 136–145)
TRIGL SERPL-MCNC: 31 MG/DL (ref 0–150)
VLDLC SERPL CALC-MCNC: 6.2 MG/DL (ref 5–40)

## 2019-03-13 PROCEDURE — 99214 OFFICE O/P EST MOD 30 MIN: CPT | Performed by: INTERNAL MEDICINE

## 2019-03-13 NOTE — PROGRESS NOTES
Sharonda Ridley is a 89 y.o. female who presents with   Chief Complaint   Patient presents with   • Hypertension     Blood sugar range at home since last visit 130-140/50-60   • Hyperlipidemia   • Blood Sugar Problem   .    89-year-old female.  6-month checkup/lab update visit.  No complaints or problems on today's visit.      Hypertension   This is a chronic problem. The current episode started more than 1 year ago. The problem is controlled. Current antihypertension treatment includes calcium channel blockers and ACE inhibitors. The current treatment provides moderate improvement. There are no compliance problems.    Hyperlipidemia   This is a chronic problem. The current episode started more than 1 year ago. The problem is controlled. Recent lipid tests were reviewed and are normal. Exacerbating diseases include diabetes. Current antihyperlipidemic treatment includes statins. The current treatment provides moderate improvement of lipids. There are no compliance problems.    Blood Sugar Problem   This is a chronic problem. The current episode started more than 1 year ago. The problem has been waxing and waning. Treatments tried: Dietary control.        The following portions of the patient's history were reviewed and updated as appropriate: allergies, current medications, past medical history and problem list.    Review of Systems   Constitutional: Negative.    HENT: Negative.    Eyes: Negative.    Respiratory: Negative.    Cardiovascular: Negative.    Genitourinary: Negative.    Musculoskeletal: Negative.    Skin: Negative.    Neurological: Negative.    Psychiatric/Behavioral: Negative.        Objective   Physical Exam   Constitutional: She is oriented to person, place, and time. She appears well-developed and well-nourished. No distress.   HENT:   Head: Normocephalic and atraumatic.   Eyes: Conjunctivae and EOM are normal. Pupils are equal, round, and reactive to light.   Neck: Normal range of motion. Neck  supple. No thyromegaly present.   Neck exam negative.  Carotid auscultation normal-no bruits heard.   Cardiovascular: Normal rate, regular rhythm, normal heart sounds and intact distal pulses. Exam reveals no gallop and no friction rub.   No murmur heard.  Pulmonary/Chest: Effort normal and breath sounds normal. No respiratory distress. She has no wheezes. She has no rales. She exhibits no tenderness.   Neurological: She is alert and oriented to person, place, and time.   Psychiatric: She has a normal mood and affect. Her behavior is normal. Judgment and thought content normal.   Nursing note and vitals reviewed.      Assessment/Plan   Sharonda was seen today for hypertension, hyperlipidemia and blood sugar problem.    Diagnoses and all orders for this visit:    Essential hypertension  -     Comprehensive Metabolic Panel    Mixed hyperlipidemia  -     Comprehensive Metabolic Panel  -     Lipid Panel    Hyperglycemia  -     Comprehensive Metabolic Panel  -     Hemoglobin A1c      Plan: Labs as above.Today's exam unremarkable for any new problems or events.  Continue all current treatment as prescribed.  A follow-up checkup/lab update visit is advised for 6 months assuming today's labs are all acceptable.

## 2019-03-29 DIAGNOSIS — I10 HTN (HYPERTENSION), BENIGN: ICD-10-CM

## 2019-03-29 DIAGNOSIS — R06.09 DYSPNEA ON EXERTION: ICD-10-CM

## 2019-03-29 RX ORDER — MONTELUKAST SODIUM 10 MG/1
TABLET ORAL
Qty: 30 TABLET | Refills: 1 | Status: SHIPPED | OUTPATIENT
Start: 2019-03-29 | End: 2019-05-28 | Stop reason: SDUPTHER

## 2019-03-29 RX ORDER — LISINOPRIL 20 MG/1
TABLET ORAL
Qty: 30 TABLET | Refills: 1 | Status: SHIPPED | OUTPATIENT
Start: 2019-03-29 | End: 2019-05-28 | Stop reason: SDUPTHER

## 2019-03-29 RX ORDER — AMLODIPINE BESYLATE 5 MG/1
TABLET ORAL
Qty: 30 TABLET | Refills: 1 | Status: SHIPPED | OUTPATIENT
Start: 2019-03-29 | End: 2019-05-28 | Stop reason: SDUPTHER

## 2019-04-02 DIAGNOSIS — Z88.9 HX OF SEASONAL ALLERGIES: ICD-10-CM

## 2019-04-03 RX ORDER — FLUTICASONE PROPIONATE 50 MCG
SPRAY, SUSPENSION (ML) NASAL
Qty: 1 BOTTLE | Refills: 2 | Status: SHIPPED | OUTPATIENT
Start: 2019-04-03 | End: 2019-06-30 | Stop reason: SDUPTHER

## 2019-05-28 DIAGNOSIS — R06.09 DYSPNEA ON EXERTION: ICD-10-CM

## 2019-05-28 DIAGNOSIS — I10 HTN (HYPERTENSION), BENIGN: ICD-10-CM

## 2019-05-29 RX ORDER — MONTELUKAST SODIUM 10 MG/1
TABLET ORAL
Qty: 30 TABLET | Refills: 0 | Status: SHIPPED | OUTPATIENT
Start: 2019-05-29 | End: 2019-06-28 | Stop reason: SDUPTHER

## 2019-05-29 RX ORDER — LISINOPRIL 20 MG/1
TABLET ORAL
Qty: 30 TABLET | Refills: 0 | Status: SHIPPED | OUTPATIENT
Start: 2019-05-29 | End: 2019-06-28 | Stop reason: SDUPTHER

## 2019-05-29 RX ORDER — AMLODIPINE BESYLATE 5 MG/1
TABLET ORAL
Qty: 30 TABLET | Refills: 0 | Status: SHIPPED | OUTPATIENT
Start: 2019-05-29 | End: 2019-06-28 | Stop reason: SDUPTHER

## 2019-06-28 DIAGNOSIS — R06.09 DYSPNEA ON EXERTION: ICD-10-CM

## 2019-06-28 DIAGNOSIS — I10 HTN (HYPERTENSION), BENIGN: ICD-10-CM

## 2019-06-28 RX ORDER — LISINOPRIL 20 MG/1
TABLET ORAL
Qty: 30 TABLET | Refills: 3 | Status: SHIPPED | OUTPATIENT
Start: 2019-06-28 | End: 2019-11-03 | Stop reason: SDUPTHER

## 2019-06-28 RX ORDER — MONTELUKAST SODIUM 10 MG/1
TABLET ORAL
Qty: 30 TABLET | Refills: 3 | Status: SHIPPED | OUTPATIENT
Start: 2019-06-28 | End: 2019-11-03 | Stop reason: SDUPTHER

## 2019-06-28 RX ORDER — AMLODIPINE BESYLATE 5 MG/1
TABLET ORAL
Qty: 30 TABLET | Refills: 3 | Status: SHIPPED | OUTPATIENT
Start: 2019-06-28 | End: 2019-11-03 | Stop reason: SDUPTHER

## 2019-06-30 DIAGNOSIS — Z88.9 HX OF SEASONAL ALLERGIES: ICD-10-CM

## 2019-07-01 RX ORDER — FLUTICASONE PROPIONATE 50 MCG
SPRAY, SUSPENSION (ML) NASAL
Qty: 16 ML | Refills: 1 | Status: SHIPPED | OUTPATIENT
Start: 2019-07-01 | End: 2019-09-06 | Stop reason: SDUPTHER

## 2019-09-06 DIAGNOSIS — Z88.9 HX OF SEASONAL ALLERGIES: ICD-10-CM

## 2019-09-06 RX ORDER — FLUTICASONE PROPIONATE 50 MCG
2 SPRAY, SUSPENSION (ML) NASAL DAILY
Qty: 16 ML | Refills: 3 | Status: SHIPPED | OUTPATIENT
Start: 2019-09-06 | End: 2020-01-20

## 2019-10-02 ENCOUNTER — OFFICE VISIT (OUTPATIENT)
Dept: INTERNAL MEDICINE | Age: 84
End: 2019-10-02

## 2019-10-02 VITALS
HEART RATE: 79 BPM | HEIGHT: 64 IN | BODY MASS INDEX: 18.27 KG/M2 | SYSTOLIC BLOOD PRESSURE: 156 MMHG | DIASTOLIC BLOOD PRESSURE: 60 MMHG | WEIGHT: 107 LBS | OXYGEN SATURATION: 96 % | RESPIRATION RATE: 13 BRPM | TEMPERATURE: 97.4 F

## 2019-10-02 DIAGNOSIS — R35.1 NOCTURIA: ICD-10-CM

## 2019-10-02 DIAGNOSIS — I10 ESSENTIAL HYPERTENSION: Primary | ICD-10-CM

## 2019-10-02 DIAGNOSIS — E78.2 MIXED HYPERLIPIDEMIA: ICD-10-CM

## 2019-10-02 DIAGNOSIS — R73.9 HYPERGLYCEMIA: ICD-10-CM

## 2019-10-02 PROCEDURE — 99214 OFFICE O/P EST MOD 30 MIN: CPT | Performed by: INTERNAL MEDICINE

## 2019-10-02 NOTE — PROGRESS NOTES
Sharonda Ridley is a 89 y.o. female who presents with   Chief Complaint   Patient presents with   • Hypertension     Amlodipine 5 mg; lisinopril 20 mg; blood pressure at home 130-140/50-60   • Hyperlipidemia     Atorvastatin 10 mg   • Blood Sugar Problem     No prescription medication   • Nocturia     Nocturia x2.  No fever, chills or dysuria   .    89-year-old female.  6-month checkup/lab update visit.  Only complaint is nocturia but no symptoms of a UTI.      Hypertension   This is a chronic problem. The current episode started more than 1 year ago. The problem is controlled. Current antihypertension treatment includes calcium channel blockers and ACE inhibitors. The current treatment provides moderate improvement. There are no compliance problems.    Hyperlipidemia   This is a chronic problem. The current episode started more than 1 year ago. The problem is controlled. Recent lipid tests were reviewed and are normal. Exacerbating diseases include diabetes. Current antihyperlipidemic treatment includes statins. The current treatment provides moderate improvement of lipids. There are no compliance problems.    Blood Sugar Problem   This is a chronic problem. The current episode started more than 1 year ago. The problem has been waxing and waning. She has tried nothing for the symptoms.        The following portions of the patient's history were reviewed and updated as appropriate: allergies, current medications, past medical history and problem list.    Review of Systems   Constitutional: Negative.    HENT: Negative.    Eyes: Negative.    Respiratory: Negative.    Cardiovascular: Negative.    Genitourinary: Negative.    Musculoskeletal: Negative.    Skin: Negative.    Neurological: Negative.    Psychiatric/Behavioral: Negative.        Objective   Physical Exam   Constitutional: She is oriented to person, place, and time. She appears well-developed and well-nourished. No distress.   HENT:   Head: Normocephalic  and atraumatic.   Eyes: Conjunctivae and EOM are normal. Pupils are equal, round, and reactive to light.   Neck: Normal range of motion. Neck supple. No thyromegaly present.   Neck exam negative.  Carotid auscultation normal-no bruits heard.   Cardiovascular: Normal rate, regular rhythm, normal heart sounds and intact distal pulses. Exam reveals no gallop and no friction rub.   No murmur heard.  Pulmonary/Chest: Effort normal and breath sounds normal. No respiratory distress. She has no wheezes. She has no rales. She exhibits no tenderness.   Neurological: She is alert and oriented to person, place, and time.   Psychiatric: She has a normal mood and affect. Her behavior is normal. Judgment and thought content normal.   Nursing note and vitals reviewed.      Assessment/Plan   Sharonda was seen today for hypertension, hyperlipidemia, blood sugar problem and nocturia.    Diagnoses and all orders for this visit:    Essential hypertension  -     Comprehensive Metabolic Panel  -     TSH+Free T4    Mixed hyperlipidemia  -     Comprehensive Metabolic Panel  -     Lipid Panel  -     TSH+Free T4    Hyperglycemia  -     Comprehensive Metabolic Panel  -     TSH+Free T4  -     Hemoglobin A1c    Nocturia  -     Urinalysis With Culture If Indicated -      Plan: Labs as above for the issues as outlined.  Patient was not interested in urology referral or any specific treatment for her bladder symptoms.    Continue treatment as prescribed.  6-month checkup/lab update visit advised

## 2019-10-03 LAB
ALBUMIN SERPL-MCNC: 4.4 G/DL (ref 3.5–5.2)
ALBUMIN/GLOB SERPL: 1.8 G/DL
ALP SERPL-CCNC: 67 U/L (ref 39–117)
ALT SERPL-CCNC: 28 U/L (ref 1–33)
APPEARANCE UR: CLEAR
AST SERPL-CCNC: 30 U/L (ref 1–32)
BACTERIA #/AREA URNS HPF: ABNORMAL /HPF
BILIRUB SERPL-MCNC: 0.4 MG/DL (ref 0.2–1.2)
BILIRUB UR QL STRIP: NEGATIVE
BUN SERPL-MCNC: 19 MG/DL (ref 8–23)
BUN/CREAT SERPL: 23.5 (ref 7–25)
CALCIUM SERPL-MCNC: 9.4 MG/DL (ref 8.6–10.5)
CASTS URNS MICRO: ABNORMAL
CASTS URNS QL MICRO: PRESENT /LPF
CHLORIDE SERPL-SCNC: 93 MMOL/L (ref 98–107)
CHOLEST SERPL-MCNC: 172 MG/DL (ref 0–200)
CO2 SERPL-SCNC: 28 MMOL/L (ref 22–29)
COLOR UR: YELLOW
CREAT SERPL-MCNC: 0.81 MG/DL (ref 0.57–1)
EPI CELLS #/AREA URNS HPF: ABNORMAL /HPF
GLOBULIN SER CALC-MCNC: 2.5 GM/DL
GLUCOSE SERPL-MCNC: 104 MG/DL (ref 65–99)
GLUCOSE UR QL: NEGATIVE
HBA1C MFR BLD: 5.4 % (ref 4.8–5.6)
HDLC SERPL-MCNC: 144 MG/DL (ref 40–60)
HGB UR QL STRIP: NEGATIVE
KETONES UR QL STRIP: NEGATIVE
LDLC SERPL CALC-MCNC: 22 MG/DL (ref 0–100)
LEUKOCYTE ESTERASE UR QL STRIP: NEGATIVE
MICRO URNS: NORMAL
MICRO URNS: NORMAL
NITRITE UR QL STRIP: NEGATIVE
PH UR STRIP: 7.5 [PH] (ref 5–7.5)
POTASSIUM SERPL-SCNC: 4.9 MMOL/L (ref 3.5–5.2)
PROT SERPL-MCNC: 6.9 G/DL (ref 6–8.5)
PROT UR QL STRIP: NEGATIVE
RBC #/AREA URNS HPF: ABNORMAL /HPF
SODIUM SERPL-SCNC: 133 MMOL/L (ref 136–145)
SP GR UR: 1.01 (ref 1–1.03)
T4 FREE SERPL-MCNC: 1.33 NG/DL (ref 0.93–1.7)
TRIGL SERPL-MCNC: 29 MG/DL (ref 0–150)
TSH SERPL DL<=0.005 MIU/L-ACNC: 0.8 UIU/ML (ref 0.27–4.2)
URINALYSIS REFLEX: NORMAL
UROBILINOGEN UR STRIP-MCNC: 0.2 MG/DL (ref 0.2–1)
VLDLC SERPL CALC-MCNC: 5.8 MG/DL
WBC #/AREA URNS HPF: ABNORMAL /HPF

## 2019-11-03 DIAGNOSIS — R06.09 DYSPNEA ON EXERTION: ICD-10-CM

## 2019-11-03 DIAGNOSIS — I10 HTN (HYPERTENSION), BENIGN: ICD-10-CM

## 2019-11-04 RX ORDER — LISINOPRIL 20 MG/1
TABLET ORAL
Qty: 30 TABLET | Refills: 2 | Status: SHIPPED | OUTPATIENT
Start: 2019-11-04 | End: 2020-02-17

## 2019-11-04 RX ORDER — MONTELUKAST SODIUM 10 MG/1
TABLET ORAL
Qty: 30 TABLET | Refills: 2 | Status: SHIPPED | OUTPATIENT
Start: 2019-11-04 | End: 2020-02-17

## 2019-11-04 RX ORDER — AMLODIPINE BESYLATE 5 MG/1
TABLET ORAL
Qty: 30 TABLET | Refills: 2 | Status: SHIPPED | OUTPATIENT
Start: 2019-11-04 | End: 2020-02-17

## 2019-11-25 ENCOUNTER — HOSPITAL ENCOUNTER (OUTPATIENT)
Dept: GENERAL RADIOLOGY | Facility: HOSPITAL | Age: 84
Discharge: HOME | End: 2019-11-25
Admitting: INTERNAL MEDICINE

## 2019-11-25 ENCOUNTER — OFFICE VISIT (OUTPATIENT)
Dept: INTERNAL MEDICINE | Age: 84
End: 2019-11-25

## 2019-11-25 VITALS
BODY MASS INDEX: 18.27 KG/M2 | SYSTOLIC BLOOD PRESSURE: 138 MMHG | TEMPERATURE: 98.5 F | HEIGHT: 64 IN | RESPIRATION RATE: 13 BRPM | HEART RATE: 74 BPM | WEIGHT: 107 LBS | OXYGEN SATURATION: 98 % | DIASTOLIC BLOOD PRESSURE: 60 MMHG

## 2019-11-25 DIAGNOSIS — M54.41 ACUTE RIGHT-SIDED LOW BACK PAIN WITH RIGHT-SIDED SCIATICA: Primary | ICD-10-CM

## 2019-11-25 DIAGNOSIS — I10 ESSENTIAL HYPERTENSION: ICD-10-CM

## 2019-11-25 PROCEDURE — 72100 X-RAY EXAM L-S SPINE 2/3 VWS: CPT

## 2019-11-25 PROCEDURE — 99214 OFFICE O/P EST MOD 30 MIN: CPT | Performed by: INTERNAL MEDICINE

## 2019-11-25 RX ORDER — IBUPROFEN 200 MG
200 TABLET ORAL EVERY 6 HOURS PRN
COMMUNITY
End: 2022-01-01 | Stop reason: HOSPADM

## 2019-11-25 RX ORDER — METHYLPREDNISOLONE 4 MG/1
TABLET ORAL
Qty: 21 TABLET | Refills: 0 | Status: SHIPPED | OUTPATIENT
Start: 2019-11-25 | End: 2020-07-23

## 2019-11-25 NOTE — PROGRESS NOTES
"  Sharonda Ridley is a 89 y.o. female who presents with   Chief Complaint   Patient presents with   • Back Pain     89-year-old female.  Her arm slipped off of her chair 3 weeks ago resulting in right low back pain and right leg sciatic pain with a sensation that her leg is going to \"give way\" when she walks   • Hypertension     Amlodipine 5 mg; lisinopril 20 mg.  Blood pressures at home 130-140/50-60   .    89-year-old female.  History as above regarding back pain.  She denies any numbness or tingling in her right leg but does state that she has some weakness of the right leg.  Her granddaughter is requesting a prescription for a wheelchair and the patient has a mobility limitation that significantly impairs her ability to participate in activities of daily living such as toileting, feeding, dressing and bathing and customary locations within the home.  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  The use of a wheelchair will significantly improve the patient's ability to participate in these activities of daily living.      Back Pain   This is a chronic problem. The current episode started 1 to 4 weeks ago. The problem occurs constantly. The problem has been waxing and waning since onset. The pain is present in the lumbar spine. The pain radiates to the right thigh, right foot and right knee. The pain is moderate. The pain is the same all the time. The symptoms are aggravated by twisting. She has tried nothing for the symptoms.   Hypertension   This is a chronic problem. The current episode started more than 1 year ago. The problem has been waxing and waning since onset. The problem is controlled. Current antihypertension treatment includes calcium channel blockers. The current treatment provides mild improvement. There are no compliance problems.         /60   Pulse 74   Temp 98.5 °F (36.9 °C)   Resp 13   Ht 162.6 cm (64.02\")   Wt " 48.5 kg (107 lb)   LMP  (LMP Unknown)   SpO2 98%   BMI 18.36 kg/m²     The following portions of the patient's history were reviewed and updated as appropriate: allergies, current medications, past medical history and problem list.    Review of Systems   Constitutional: Negative.    HENT: Negative.    Eyes: Negative.    Respiratory: Negative.    Cardiovascular: Negative.    Genitourinary: Negative.    Musculoskeletal: Positive for back pain.   Skin: Negative.    Neurological: Negative.    Psychiatric/Behavioral: Negative.        Objective   Physical Exam   Constitutional: She is oriented to person, place, and time. She appears well-developed and well-nourished. No distress.   HENT:   Head: Normocephalic and atraumatic.   Eyes: Conjunctivae and EOM are normal. Pupils are equal, round, and reactive to light.   Neck: Normal range of motion. Neck supple. No thyromegaly present.   Neck exam negative.  Carotid auscultation normal-no bruits heard.   Cardiovascular: Normal rate, regular rhythm, normal heart sounds and intact distal pulses. Exam reveals no gallop and no friction rub.   No murmur heard.  Pulmonary/Chest: Effort normal and breath sounds normal. No respiratory distress. She has no wheezes. She has no rales. She exhibits no tenderness.   Musculoskeletal:   She is palpably tender in the right sacroiliac junction.  There is no sensory deficit in either leg but the right leg to her subjectively seems weaker than the left she says.  Distal pulsations are palpably normal.   Neurological: She is alert and oriented to person, place, and time.   Psychiatric: She has a normal mood and affect. Her behavior is normal. Judgment and thought content normal.   Nursing note and vitals reviewed.      Assessment/Plan   Sharonda was seen today for back pain and hypertension.    Diagnoses and all orders for this visit:    Acute right-sided low back pain with right-sided sciatica  -     methylPREDNISolone (MEDROL, REESE,) 4 MG  tablet; Take as directed on package instructions.  -     XR Spine Lumbar 2 or 3 View    Essential hypertension      Plan: We will get an x-ray of her lumbosacral spine today and start her on a Medrol Dosepak in the morning.  Today and this evening she will take 2 Advil every 4-6 hours as needed for pain.  If problems persist after the Medrol Dosepak she may need an MRI of the lumbosacral spine.    Blood pressure stable.  Continue current treatment as prescribed.

## 2019-11-26 ENCOUNTER — TELEPHONE (OUTPATIENT)
Dept: INTERNAL MEDICINE | Age: 84
End: 2019-11-26

## 2019-12-19 ENCOUNTER — TELEPHONE (OUTPATIENT)
Dept: CASE MANAGEMENT | Facility: OTHER | Age: 84
End: 2019-12-19

## 2019-12-19 NOTE — TELEPHONE ENCOUNTER
PATIENT DECLINED AWV - SHE WILL SPEAK WITH PCP DR TSAI ABOUT THE AWV     ______     Left message for patient to schedule an AWV with current pcp. Please return call to [phone].      Jolie [facility] and   [phone]

## 2019-12-23 ENCOUNTER — TELEPHONE (OUTPATIENT)
Dept: INTERNAL MEDICINE | Age: 84
End: 2019-12-23

## 2019-12-23 DIAGNOSIS — G89.29 CHRONIC BILATERAL LOW BACK PAIN WITHOUT SCIATICA: Primary | ICD-10-CM

## 2019-12-23 DIAGNOSIS — M54.50 CHRONIC BILATERAL LOW BACK PAIN WITHOUT SCIATICA: Primary | ICD-10-CM

## 2019-12-23 NOTE — TELEPHONE ENCOUNTER
Pt is still complaining of back pain issues. Pt declines pain mgmt; however complains it is an effort to walk with use of a walker & difficult to get up & down.    Pt is seeking 's advice on this matter.    Pt can be reached #[phone].

## 2020-01-20 DIAGNOSIS — Z88.9 HX OF SEASONAL ALLERGIES: ICD-10-CM

## 2020-01-20 RX ORDER — FLUTICASONE PROPIONATE 50 MCG
SPRAY, SUSPENSION (ML) NASAL
Qty: 2 BOTTLE | Refills: 3 | Status: SHIPPED | OUTPATIENT
Start: 2020-01-20 | End: 2020-11-23

## 2020-02-15 DIAGNOSIS — R06.09 DYSPNEA ON EXERTION: ICD-10-CM

## 2020-02-15 DIAGNOSIS — I10 HTN (HYPERTENSION), BENIGN: ICD-10-CM

## 2020-02-17 RX ORDER — LISINOPRIL 20 MG/1
TABLET ORAL
Qty: 90 TABLET | Refills: 3 | Status: SHIPPED | OUTPATIENT
Start: 2020-02-17 | End: 2021-02-04

## 2020-02-17 RX ORDER — MONTELUKAST SODIUM 10 MG/1
TABLET ORAL
Qty: 90 TABLET | Refills: 3 | Status: SHIPPED | OUTPATIENT
Start: 2020-02-17 | End: 2021-02-22 | Stop reason: SDUPTHER

## 2020-02-17 RX ORDER — AMLODIPINE BESYLATE 5 MG/1
TABLET ORAL
Qty: 90 TABLET | Refills: 3 | Status: SHIPPED | OUTPATIENT
Start: 2020-02-17 | End: 2021-02-22 | Stop reason: SDUPTHER

## 2020-07-23 ENCOUNTER — HOSPITAL ENCOUNTER (OUTPATIENT)
Dept: GENERAL RADIOLOGY | Facility: HOSPITAL | Age: 85
Discharge: HOME | End: 2020-07-23
Admitting: NURSE PRACTITIONER

## 2020-07-23 ENCOUNTER — LAB (OUTPATIENT)
Dept: LAB | Facility: HOSPITAL | Age: 85
End: 2020-07-23

## 2020-07-23 ENCOUNTER — OFFICE VISIT (OUTPATIENT)
Dept: INTERNAL MEDICINE | Age: 85
End: 2020-07-23

## 2020-07-23 VITALS
BODY MASS INDEX: 17.38 KG/M2 | HEIGHT: 64 IN | TEMPERATURE: 97.5 F | SYSTOLIC BLOOD PRESSURE: 150 MMHG | HEART RATE: 81 BPM | DIASTOLIC BLOOD PRESSURE: 80 MMHG | WEIGHT: 101.8 LBS | OXYGEN SATURATION: 97 %

## 2020-07-23 DIAGNOSIS — R06.2 WHEEZING: ICD-10-CM

## 2020-07-23 DIAGNOSIS — M54.9 UPPER BACK PAIN: ICD-10-CM

## 2020-07-23 DIAGNOSIS — R53.83 FATIGUE, UNSPECIFIED TYPE: Primary | ICD-10-CM

## 2020-07-23 DIAGNOSIS — R63.4 WEIGHT LOSS: ICD-10-CM

## 2020-07-23 LAB
ALBUMIN SERPL-MCNC: 4.3 G/DL (ref 3.5–5.2)
ALBUMIN/GLOB SERPL: 1.5 G/DL
ALP SERPL-CCNC: 75 U/L (ref 39–117)
ALT SERPL W P-5'-P-CCNC: 22 U/L (ref 1–33)
ANION GAP SERPL CALCULATED.3IONS-SCNC: 7.2 MMOL/L (ref 5–15)
AST SERPL-CCNC: 28 U/L (ref 1–32)
BASOPHILS # BLD AUTO: 0.05 10*3/MM3 (ref 0–0.2)
BASOPHILS NFR BLD AUTO: 0.5 % (ref 0–1.5)
BILIRUB SERPL-MCNC: 0.4 MG/DL (ref 0–1.2)
BUN SERPL-MCNC: 17 MG/DL (ref 8–23)
BUN/CREAT SERPL: 20.7 (ref 7–25)
CALCIUM SPEC-SCNC: 9.2 MG/DL (ref 8.2–9.6)
CHLORIDE SERPL-SCNC: 92 MMOL/L (ref 98–107)
CO2 SERPL-SCNC: 26.8 MMOL/L (ref 22–29)
CREAT SERPL-MCNC: 0.82 MG/DL (ref 0.57–1)
DEPRECATED RDW RBC AUTO: 45.4 FL (ref 37–54)
EOSINOPHIL # BLD AUTO: 0.05 10*3/MM3 (ref 0–0.4)
EOSINOPHIL NFR BLD AUTO: 0.5 % (ref 0.3–6.2)
ERYTHROCYTE [DISTWIDTH] IN BLOOD BY AUTOMATED COUNT: 13.3 % (ref 12.3–15.4)
GFR SERPL CREATININE-BSD FRML MDRD: 65 ML/MIN/1.73
GLOBULIN UR ELPH-MCNC: 2.8 GM/DL
GLUCOSE SERPL-MCNC: 91 MG/DL (ref 65–99)
HCT VFR BLD AUTO: 39.3 % (ref 34–46.6)
HGB BLD-MCNC: 13 G/DL (ref 12–15.9)
IMM GRANULOCYTES # BLD AUTO: 0.07 10*3/MM3 (ref 0–0.05)
IMM GRANULOCYTES NFR BLD AUTO: 0.8 % (ref 0–0.5)
LYMPHOCYTES # BLD AUTO: 1.79 10*3/MM3 (ref 0.7–3.1)
LYMPHOCYTES NFR BLD AUTO: 19.6 % (ref 19.6–45.3)
MCH RBC QN AUTO: 30.7 PG (ref 26.6–33)
MCHC RBC AUTO-ENTMCNC: 33.1 G/DL (ref 31.5–35.7)
MCV RBC AUTO: 92.9 FL (ref 79–97)
MONOCYTES # BLD AUTO: 0.62 10*3/MM3 (ref 0.1–0.9)
MONOCYTES NFR BLD AUTO: 6.8 % (ref 5–12)
NEUTROPHILS NFR BLD AUTO: 6.57 10*3/MM3 (ref 1.7–7)
NEUTROPHILS NFR BLD AUTO: 71.8 % (ref 42.7–76)
NRBC BLD AUTO-RTO: 0 /100 WBC (ref 0–0.2)
PLATELET # BLD AUTO: 385 10*3/MM3 (ref 140–450)
PMV BLD AUTO: 9 FL (ref 6–12)
POTASSIUM SERPL-SCNC: 4.8 MMOL/L (ref 3.5–5.2)
PROT SERPL-MCNC: 7.1 G/DL (ref 6–8.5)
RBC # BLD AUTO: 4.23 10*6/MM3 (ref 3.77–5.28)
SODIUM SERPL-SCNC: 126 MMOL/L (ref 136–145)
TSH SERPL DL<=0.05 MIU/L-ACNC: 1.24 UIU/ML (ref 0.27–4.2)
WBC # BLD AUTO: 9.15 10*3/MM3 (ref 3.4–10.8)

## 2020-07-23 PROCEDURE — 85025 COMPLETE CBC W/AUTO DIFF WBC: CPT | Performed by: NURSE PRACTITIONER

## 2020-07-23 PROCEDURE — 71046 X-RAY EXAM CHEST 2 VIEWS: CPT

## 2020-07-23 PROCEDURE — 84443 ASSAY THYROID STIM HORMONE: CPT | Performed by: NURSE PRACTITIONER

## 2020-07-23 PROCEDURE — 36415 COLL VENOUS BLD VENIPUNCTURE: CPT | Performed by: NURSE PRACTITIONER

## 2020-07-23 PROCEDURE — 80053 COMPREHEN METABOLIC PANEL: CPT | Performed by: NURSE PRACTITIONER

## 2020-07-23 PROCEDURE — 99214 OFFICE O/P EST MOD 30 MIN: CPT | Performed by: NURSE PRACTITIONER

## 2020-07-23 RX ORDER — ALBUTEROL SULFATE 90 UG/1
1-2 AEROSOL, METERED RESPIRATORY (INHALATION) EVERY 4 HOURS PRN
Qty: 1 INHALER | Refills: 1 | Status: SHIPPED | OUTPATIENT
Start: 2020-07-23 | End: 2020-07-27 | Stop reason: SDUPTHER

## 2020-07-23 NOTE — PROGRESS NOTES
[facility] INTERNAL MEDICINE  Yolanda Ridley / 89 y.o. / female  2020      ASSESSMENT & PLAN:    Problem List Items Addressed This Visit     None      Visit Diagnoses     Fatigue, unspecified type    -  Primary    Relevant Orders    CBC & Differential    Comprehensive Metabolic Panel    TSH Rfx On Abnormal To Free T4    Urinalysis With Culture If Indicated - Urine, Clean Catch    XR Chest PA & Lateral    Wheezing        Relevant Medications    albuterol sulfate  (90 Base) MCG/ACT inhaler    Spacer/Aero-Holding Chambers device    Other Relevant Orders    XR Chest PA & Lateral    Weight loss        Upper back pain            Orders Placed This Encounter   Procedures   • XR Chest PA & Lateral   • Comprehensive Metabolic Panel   • TSH Rfx On Abnormal To Free T4   • Urinalysis With Culture If Indicated - Urine, Clean Catch   • CBC & Differential     New Medications Ordered This Visit   Medications   • albuterol sulfate  (90 Base) MCG/ACT inhaler     Sig: Inhale 1-2 puffs Every 4 (Four) Hours As Needed for Wheezing or Shortness of Air.     Dispense:  1 inhaler     Refill:  1   • Spacer/Aero-Holding Chambers device     Si Device Every 4 (Four) Hours As Needed (shortness of breath, wheezing).     Dispense:  1 each     Refill:  0       Summary/Discussion:    This is a NEW patient as well as a NEW problem to this examiner.     1. Fatigue, unspecified type  Fatigue of unknown cause. Differential diagnoses include: COPD, anemia, thyroid disturbance, UTI, cancer, etc.   Check labs today.   CXR today to evaluate for new abnormality. Suspect likely COPD r/t smoking history, wheezing on exam.   Patient has regular follow up with PCP in < 2 weeks.   Further evaluation and treatment pending results of imaging and labs.     - CBC & Differential  - Comprehensive Metabolic Panel  - TSH Rfx On Abnormal To Free T4  - Urinalysis With Culture If Indicated - Urine, Clean Catch  - XR Chest PA &  "Lateral    2. Wheezing    - XR Chest PA & Lateral  - albuterol sulfate  (90 Base) MCG/ACT inhaler; Inhale 1-2 puffs Every 4 (Four) Hours As Needed for Wheezing or Shortness of Air.  Dispense: 1 inhaler; Refill: 1  - Spacer/Aero-Holding Chambers device; 1 Device Every 4 (Four) Hours As Needed (shortness of breath, wheezing).  Dispense: 1 each; Refill: 0    3. Weight loss      4. Upper back pain  Unable to elicit any vertebral tenderness, no localizing pain. NO recent trauma or fall. CXR today. If still painful at follow up with PCP, may need to consider thoracic spine XR.         No follow-ups on file.    ____________________________________________________________________    MEDICATIONS  Current Outpatient Medications   Medication Sig Dispense Refill   • amLODIPine (NORVASC) 5 MG tablet TAKE ONE TABLET BY MOUTH DAILY 90 tablet 3   • aspirin 81 MG EC tablet Take 81 mg by mouth Daily.     • atorvastatin (LIPITOR) 10 MG tablet Take 10 mg by mouth Daily.     • fexofenadine (ALLEGRA) 180 MG tablet Take 1 tablet by mouth Daily.     • fluticasone (FLONASE) 50 MCG/ACT nasal spray SPRAY TWO SPRAYS IN EACH NOSTRIL ONCE DAILY 2 bottle 3   • ibuprofen (ADVIL,MOTRIN) 200 MG tablet Take 200 mg by mouth Every 6 (Six) Hours As Needed for Mild Pain .     • lisinopril (PRINIVIL,ZESTRIL) 20 MG tablet TAKE ONE TABLET BY MOUTH DAILY 90 tablet 3   • montelukast (SINGULAIR) 10 MG tablet TAKE ONE TABLET BY MOUTH EVERY EVENING 90 tablet 3   • albuterol sulfate  (90 Base) MCG/ACT inhaler Inhale 1-2 puffs Every 4 (Four) Hours As Needed for Wheezing or Shortness of Air. 1 inhaler 1   • Spacer/Aero-Holding Chambers device 1 Device Every 4 (Four) Hours As Needed (shortness of breath, wheezing). 1 each 0     No current facility-administered medications for this visit.           VITALS:    Visit Vitals  /80   Pulse 81   Temp 97.5 °F (36.4 °C) (Temporal)   Ht 162.6 cm (64.02\")   Wt 46.2 kg (101 lb 12.8 oz)   LMP  (LMP Unknown)   " "  SpO2 97%   BMI 17.47 kg/m²       BP Readings from Last 3 Encounters:   07/23/20 150/80   11/25/19 138/60   10/02/19 156/60     Wt Readings from Last 3 Encounters:   07/23/20 46.2 kg (101 lb 12.8 oz)   11/25/19 48.5 kg (107 lb)   10/02/19 48.5 kg (107 lb)      Body mass index is 17.47 kg/m².    CC:  Main reason(s) for today's visit: Back Pain (upper back ); Wheezing; Fatigue; Extremity Weakness; Weight Loss; GI Problem; and Sleeping Problem      HPI:      Fatigue: Patient complains of fatigue. Symptoms began a week ago. Sentinal symptom the patient feels fatigue began with: upper back pain. She has not had any trauma, falls, no previous issues with upper back pain.   Symptoms of her fatigue have been reports \"legs feel tired\", generalized fatigue. She has also lost approximately 6 lbs since last office visit.   Granddaughter has noted some recent wheezing, although patient denies any SOA and reports still using her indoor bicycle on a daily basis without issue. She is a current daily lifelong smoker.   Patient describes the following psychologic symptoms: none.  Patient denies exercise intolerance and GI blood loss. Symptoms have gradually worsened.     Granddaughter Amy outside of office visit reports to me that she has been having more diarrhea and accidents recently, but would not disclose this to me during her visit. She also reports she seems to be more confused recently.     Patient Care Team:  Clay Martínez MD as PCP - General  Clay Martínez MD as PCP - Family Medicine  Clay Martínez MD as PCP - Claims Attributed    ____________________________________________________________________    REVIEW OF SYSTEMS    Review of Systems   Constitutional: Positive for fatigue and unexpected weight change.   Respiratory: Positive for wheezing. Negative for cough and shortness of breath.    Cardiovascular: Negative for chest pain, palpitations and leg swelling.   Gastrointestinal: Positive for diarrhea. Negative " for abdominal pain and blood in stool.   Genitourinary: Negative for difficulty urinating, dysuria and frequency.   Musculoskeletal: Positive for back pain.         PHYSICAL EXAMINATION    Physical Exam   Constitutional: She is oriented to person, place, and time. Vital signs are normal. She appears well-developed and well-nourished. She is cooperative. She does not appear ill. No distress.   Cardiovascular: Normal rate, regular rhythm, S1 normal, S2 normal and normal heart sounds.   No murmur heard.  Pulmonary/Chest: Effort normal. She has no decreased breath sounds. She has wheezes. She has no rhonchi. She has no rales.   Musculoskeletal:        Thoracic back: She exhibits pain. She exhibits normal range of motion, no tenderness, no bony tenderness, no swelling and no spasm.        Back:    Patient unable to localized particular area for pain to upper back. No vertebral tenderness elicited.    Neurological: She is alert and oriented to person, place, and time.   Skin: Skin is warm, dry and intact.   Psychiatric: She has a normal mood and affect. Her speech is normal and behavior is normal. Judgment and thought content normal. Cognition and memory are normal.   Nursing note and vitals reviewed.      REVIEWED DATA:    Labs:     Lab Results   Component Value Date     (L) 10/02/2019    K 4.9 10/02/2019    AST 30 10/02/2019    ALT 28 10/02/2019    BUN 19 10/02/2019    CREATININE 0.81 10/02/2019    CREATININE 0.73 03/13/2019    CREATININE 0.76 09/12/2018    EGFRIFNONA 67 10/02/2019    EGFRIFAFRI 81 10/02/2019       Lab Results   Component Value Date    HGBA1C 5.40 10/02/2019    HGBA1C 5.20 03/13/2019    HGBA1C 5.44 09/12/2018    GLUCOSE 97 06/07/2017       Lab Results   Component Value Date    LDL 22 10/02/2019    LDL 27 03/13/2019    LDL 29 09/12/2018     (H) 10/02/2019     (H) 03/13/2019     (H) 09/12/2018    TRIG 29 10/02/2019    TRIG 31 03/13/2019    TRIG 32 09/12/2018       Lab Results      Component Value Date    TSH 0.801 10/02/2019    FREET4 1.33 10/02/2019       Lab Results   Component Value Date    WBC 8.53 06/07/2017    HGB 14.4 06/07/2017     06/07/2017       Lab Results   Component Value Date    PROTEIN Negative 10/02/2019    GLUCOSEU Negative 10/02/2019    BLOODU Negative 10/02/2019    NITRITEU Negative 10/02/2019    LEUKOCYTESUR Negative 10/02/2019       Imaging:         Medical Tests:         Summary of old records / correspondence / consultant report:         Request outside records:         ALLERGIES  Allergies   Allergen Reactions   • Codeine Itching   • Sulfa Antibiotics Arrhythmia   • Penicillins Rash        PFSH:     The following portions of the patient's history were reviewed and updated as appropriate: Allergies / Current Medications / Past Medical History / Surgical History / Social History / Family History    PROBLEM LIST   Patient Active Problem List   Diagnosis   • Breathlessness on exertion   • BP (high blood pressure)   • Anxiety   • Long-term use of high-risk medication   • Seasonal allergic rhinitis due to pollen   • Mixed hyperlipidemia   • Carotid stenosis, asymptomatic, bilateral   • Hyperglycemia       PAST MEDICAL HISTORY  Past Medical History:   Diagnosis Date   • Arthritis    • Hyperlipidemia    • Hypertension    • Methicillin resistant Staphylococcus aureus infection        SURGICAL HISTORY  Past Surgical History:   Procedure Laterality Date   • ORIF PROXIMAL HUMERUS FRACTURE         SOCIAL HISTORY  Social History     Socioeconomic History   • Marital status:      Spouse name: Not on file   • Number of children: Not on file   • Years of education: Not on file   • Highest education level: Not on file   Tobacco Use   • Smoking status: Light Tobacco Smoker     Types: Cigarettes   • Smokeless tobacco: Never Used   Substance and Sexual Activity   • Alcohol use: Yes     Comment: wine        FAMILY HISTORY  Family History   Problem Relation Age of Onset   •  Stroke Mother    • Heart valve disorder Father    • Cancer Other         sibling         **[place] Disclaimer:   Much of this encounter note is an electronic transcription/translation of spoken language to printed text. The electronic translation of spoken language may permit erroneous, or at times, nonsensical words or phrases to be inadvertently transcribed. Although I have reviewed the note for such errors, some may still exist.

## 2020-07-27 DIAGNOSIS — R06.2 WHEEZING: ICD-10-CM

## 2020-07-27 RX ORDER — ALBUTEROL SULFATE 90 UG/1
1-2 AEROSOL, METERED RESPIRATORY (INHALATION) EVERY 4 HOURS PRN
Qty: 1 INHALER | Refills: 1 | Status: SHIPPED | OUTPATIENT
Start: 2020-07-27 | End: 2021-01-01 | Stop reason: SDUPTHER

## 2020-08-05 ENCOUNTER — OFFICE VISIT (OUTPATIENT)
Dept: INTERNAL MEDICINE | Age: 85
End: 2020-08-05

## 2020-08-05 VITALS
BODY MASS INDEX: 17.38 KG/M2 | HEART RATE: 80 BPM | WEIGHT: 101.8 LBS | TEMPERATURE: 97.4 F | HEIGHT: 64 IN | OXYGEN SATURATION: 97 %

## 2020-08-05 DIAGNOSIS — E87.1 HYPONATREMIA: ICD-10-CM

## 2020-08-05 DIAGNOSIS — I10 ESSENTIAL HYPERTENSION: Primary | ICD-10-CM

## 2020-08-05 DIAGNOSIS — E78.2 MIXED HYPERLIPIDEMIA: Primary | ICD-10-CM

## 2020-08-05 LAB
BUN SERPL-MCNC: 16 MG/DL (ref 8–23)
BUN/CREAT SERPL: 21.9 (ref 7–25)
CALCIUM SERPL-MCNC: 8.8 MG/DL (ref 8.2–9.6)
CHLORIDE SERPL-SCNC: 92 MMOL/L (ref 98–107)
CO2 SERPL-SCNC: 25.1 MMOL/L (ref 22–29)
CREAT SERPL-MCNC: 0.73 MG/DL (ref 0.57–1)
GLUCOSE SERPL-MCNC: 76 MG/DL (ref 65–99)
POTASSIUM SERPL-SCNC: 5.2 MMOL/L (ref 3.5–5.2)
SODIUM SERPL-SCNC: 126 MMOL/L (ref 136–145)

## 2020-08-05 PROCEDURE — 99214 OFFICE O/P EST MOD 30 MIN: CPT | Performed by: INTERNAL MEDICINE

## 2020-08-05 RX ORDER — ATORVASTATIN CALCIUM 10 MG/1
10 TABLET, FILM COATED ORAL DAILY
Qty: 90 TABLET | Refills: 2 | Status: SHIPPED | OUTPATIENT
Start: 2020-08-05 | End: 2020-08-31 | Stop reason: SDUPTHER

## 2020-08-05 NOTE — PROGRESS NOTES
"  Sharonda Ridley is a 89 y.o. female who presents with   Chief Complaint   Patient presents with   • Hypertension     Amlodipine 5 mg; lisinopril 20 mg   • Low-sodium     126 on July 23, 2020 lab testing   .    89-year-old female.  Checkup/lab update visit to recheck sodium.  It was thought the ibuprofen she was taking could have caused a dilutional hyponatremia from fluid retention from ibuprofen.  Also she was advised to restrict her fluid intake to 1.5 L per 24 hours.  She is here today to follow-up on all of that.    Hypertension   This is a chronic problem. The current episode started more than 1 year ago. The problem has been waxing and waning since onset. The problem is controlled. Current antihypertension treatment includes calcium channel blockers. The current treatment provides moderate improvement. There are no compliance problems.         Pulse 80   Temp 97.4 °F (36.3 °C)   Ht 162.6 cm (64.02\")   Wt 46.2 kg (101 lb 12.8 oz)   LMP  (LMP Unknown)   SpO2 97%   BMI 17.47 kg/m²     The following portions of the patient's history were reviewed and updated as appropriate: allergies, current medications, past medical history and problem list.    Review of Systems   Constitutional: Negative.    HENT: Negative.    Eyes: Negative.    Respiratory: Negative.    Cardiovascular: Negative.    Genitourinary: Negative.    Musculoskeletal: Negative.    Skin: Negative.    Neurological: Negative.    Psychiatric/Behavioral: Negative.        Objective   Physical Exam   Constitutional: She is oriented to person, place, and time. She appears well-developed and well-nourished. No distress.   HENT:   Head: Normocephalic and atraumatic.   Eyes: Pupils are equal, round, and reactive to light. Conjunctivae and EOM are normal.   Neck: Normal range of motion. Neck supple. No thyromegaly present.   Neck exam negative.  Carotid auscultation normal-no bruits heard.   Cardiovascular: Normal rate, regular rhythm, normal heart " sounds and intact distal pulses. Exam reveals no gallop and no friction rub.   No murmur heard.  Pulmonary/Chest: Effort normal and breath sounds normal. No respiratory distress. She has no wheezes. She has no rales. She exhibits no tenderness.   Neurological: She is alert and oriented to person, place, and time.   Psychiatric: She has a normal mood and affect. Her behavior is normal. Judgment and thought content normal.   Nursing note and vitals reviewed.      Assessment/Plan   Sharonda was seen today for hypertension and low-sodium.    Diagnoses and all orders for this visit:    Essential hypertension    Hyponatremia      Plan: BMP today.  Continue other treatment as prescribed.  Follow-up checkup/lab updates-4 months

## 2020-08-31 DIAGNOSIS — E78.2 MIXED HYPERLIPIDEMIA: ICD-10-CM

## 2020-08-31 RX ORDER — ATORVASTATIN CALCIUM 10 MG/1
10 TABLET, FILM COATED ORAL DAILY
Qty: 90 TABLET | Refills: 2 | Status: SHIPPED | OUTPATIENT
Start: 2020-08-31 | End: 2021-04-30 | Stop reason: SDUPTHER

## 2020-11-04 DIAGNOSIS — R06.2 WHEEZING: ICD-10-CM

## 2020-11-05 RX ORDER — INHALER, ASSIST DEVICES
SPACER (EA) MISCELLANEOUS
Qty: 1 EACH | Refills: 3 | Status: SHIPPED | OUTPATIENT
Start: 2020-11-05

## 2020-11-20 DIAGNOSIS — Z88.9 HX OF SEASONAL ALLERGIES: ICD-10-CM

## 2020-11-23 RX ORDER — FLUTICASONE PROPIONATE 50 MCG
SPRAY, SUSPENSION (ML) NASAL
Qty: 16 G | Refills: 2 | Status: SHIPPED | OUTPATIENT
Start: 2020-11-23 | End: 2021-04-19 | Stop reason: SDUPTHER

## 2020-12-21 ENCOUNTER — OFFICE VISIT (OUTPATIENT)
Dept: INTERNAL MEDICINE | Age: 85
End: 2020-12-21

## 2020-12-21 VITALS
DIASTOLIC BLOOD PRESSURE: 80 MMHG | SYSTOLIC BLOOD PRESSURE: 130 MMHG | HEIGHT: 64 IN | OXYGEN SATURATION: 100 % | BODY MASS INDEX: 17.07 KG/M2 | HEART RATE: 100 BPM | TEMPERATURE: 97.5 F | WEIGHT: 100 LBS | RESPIRATION RATE: 14 BRPM

## 2020-12-21 DIAGNOSIS — E87.1 HYPONATREMIA: ICD-10-CM

## 2020-12-21 DIAGNOSIS — R73.9 HYPERGLYCEMIA: ICD-10-CM

## 2020-12-21 DIAGNOSIS — I10 ESSENTIAL HYPERTENSION: Primary | ICD-10-CM

## 2020-12-21 PROCEDURE — 99214 OFFICE O/P EST MOD 30 MIN: CPT | Performed by: INTERNAL MEDICINE

## 2020-12-21 NOTE — PROGRESS NOTES
"  Sharonda Ridley is a 89 y.o. female who presents with   Chief Complaint   Patient presents with   • Hypertension   • Blood Sugar Problem   • Low sodium   .    89-year-old female.  Checkup/lab update visit.  She claims she was here in October and had labs with \"Dr. Guerrero\" but I see nothing to indicate that.  The last labs that I see are from July and August.  Her sodium was around 128-129 then and she was advised fluid restriction of 1.5 L daily but says instead of that she \"increased her salt consumption\".  She does not take any diuretics.    Hypertension  This is a chronic problem. The current episode started more than 1 year ago. The problem has been waxing and waning since onset. Treatments tried: See current treatment.   Blood Sugar Problem  This is a chronic problem. The current episode started more than 1 year ago. The problem has been waxing and waning. She has tried nothing for the symptoms.        /80   Pulse 100   Temp 97.5 °F (36.4 °C) (Temporal)   Resp 14   Ht 162.6 cm (64.02\")   Wt 45.4 kg (100 lb)   LMP  (LMP Unknown)   SpO2 100%   Breastfeeding No   BMI 17.15 kg/m²     The following portions of the patient's history were reviewed and updated as appropriate: allergies, current medications, past medical history and problem list.    Review of Systems   Constitutional: Negative.    HENT: Negative.    Eyes: Negative.    Respiratory: Negative.    Cardiovascular: Negative.    Genitourinary: Negative.    Musculoskeletal: Negative.    Skin: Negative.    Neurological: Negative.    Psychiatric/Behavioral: Negative.        Objective   Physical Exam  Vitals signs and nursing note reviewed.   Constitutional:       General: She is not in acute distress.     Appearance: She is well-developed. She is not diaphoretic.   HENT:      Head: Normocephalic and atraumatic.   Eyes:      Pupils: Pupils are equal, round, and reactive to light.   Neck:      Musculoskeletal: Normal range of motion and neck " supple.      Thyroid: No thyromegaly.      Comments: Neck exam negative.  Carotid auscultation normal-no bruits heard.    Cardiovascular:      Rate and Rhythm: Normal rate and regular rhythm.      Heart sounds: Normal heart sounds. No murmur. No friction rub. No gallop.    Pulmonary:      Effort: Pulmonary effort is normal. No respiratory distress.      Breath sounds: Normal breath sounds. No wheezing or rales.   Chest:      Chest wall: No tenderness.   Skin:     General: Skin is warm and dry.      Coloration: Skin is not pale.      Findings: No erythema.   Psychiatric:         Behavior: Behavior normal.         Thought Content: Thought content normal.         Judgment: Judgment normal.         Assessment/Plan   Diagnoses and all orders for this visit:    1. Essential hypertension (Primary)  -     Comprehensive Metabolic Panel    2. Hyponatremia  -     Comprehensive Metabolic Panel    3. Hyperglycemia  -     Comprehensive Metabolic Panel  -     Hemoglobin A1c      Plan: At first she declined to have labs updated because she was under the impression that she was seen in October and had them done then but after prevailing upon her that her last labs were in July and August she was agreeable to doing a comprehensive panel and an A1c.    Tentative plans are for her to continue current treatment as prescribed but to follow-up in 3 months with her chosen APRN for continued medical care.

## 2020-12-22 ENCOUNTER — DOCUMENTATION (OUTPATIENT)
Dept: INTERNAL MEDICINE | Age: 85
End: 2020-12-22

## 2020-12-22 ENCOUNTER — TELEPHONE (OUTPATIENT)
Dept: INTERNAL MEDICINE | Age: 85
End: 2020-12-22

## 2020-12-22 DIAGNOSIS — R79.89 LOW SERUM SODIUM: Primary | ICD-10-CM

## 2020-12-22 LAB
ALBUMIN SERPL-MCNC: 4.2 G/DL (ref 3.5–5.2)
ALBUMIN/GLOB SERPL: 1.4 G/DL
ALP SERPL-CCNC: 79 U/L (ref 39–117)
ALT SERPL-CCNC: 25 U/L (ref 1–33)
AST SERPL-CCNC: 30 U/L (ref 1–32)
BILIRUB SERPL-MCNC: 0.5 MG/DL (ref 0–1.2)
BUN SERPL-MCNC: 19 MG/DL (ref 8–23)
BUN/CREAT SERPL: 24.1 (ref 7–25)
CALCIUM SERPL-MCNC: 9.2 MG/DL (ref 8.2–9.6)
CHLORIDE SERPL-SCNC: 90 MMOL/L (ref 98–107)
CO2 SERPL-SCNC: 27.5 MMOL/L (ref 22–29)
CREAT SERPL-MCNC: 0.79 MG/DL (ref 0.57–1)
GLOBULIN SER CALC-MCNC: 2.9 GM/DL
GLUCOSE SERPL-MCNC: 99 MG/DL (ref 65–99)
HBA1C MFR BLD: 5.6 % (ref 4.8–5.6)
POTASSIUM SERPL-SCNC: 4.9 MMOL/L (ref 3.5–5.2)
PROT SERPL-MCNC: 7.1 G/DL (ref 6–8.5)
SODIUM SERPL-SCNC: 123 MMOL/L (ref 136–145)

## 2020-12-22 NOTE — PROGRESS NOTES
December 22, 2020: Patient was seen on an office visit yesterday for blood pressure follow-up and sodium reevaluation.  She does not seem to understand the concept of fluid restriction to help her sodium level increase however she has been taking lisinopril for her blood pressure and this medication has been known to cause syndrome of inappropriate ADH secretion (SIADH).  The patient was notified to discontinue her lisinopril but to continue all other treatment as prescribed.  She was also advised a return appointment for 4 weeks for follow-up blood pressure and follow-up sodium level.

## 2020-12-22 NOTE — TELEPHONE ENCOUNTER
PT CALLED INFORMED TO STOP LISINOPRIL 20MG AND CONTINUE ALL OTHER MEDS BP 1 MONTH FUP FOR BP CHECK WITH BALBIR MADE FOR 02/04/21

## 2021-01-01 ENCOUNTER — TELEPHONE (OUTPATIENT)
Dept: INTERNAL MEDICINE | Age: 86
End: 2021-01-01

## 2021-01-01 ENCOUNTER — TELEPHONE (OUTPATIENT)
Dept: NEUROSURGERY | Facility: CLINIC | Age: 86
End: 2021-01-01

## 2021-01-01 ENCOUNTER — TELEPHONE (OUTPATIENT)
Dept: SURGERY | Facility: CLINIC | Age: 86
End: 2021-01-01

## 2021-01-01 ENCOUNTER — OFFICE VISIT (OUTPATIENT)
Dept: NEUROSURGERY | Facility: CLINIC | Age: 86
End: 2021-01-01

## 2021-01-01 ENCOUNTER — HOSPITAL ENCOUNTER (OUTPATIENT)
Dept: GENERAL RADIOLOGY | Facility: HOSPITAL | Age: 86
Discharge: HOME | End: 2021-10-28
Admitting: NURSE PRACTITIONER

## 2021-01-01 ENCOUNTER — OFFICE VISIT (OUTPATIENT)
Dept: INTERNAL MEDICINE | Age: 86
End: 2021-01-01

## 2021-01-01 ENCOUNTER — TELEPHONE (OUTPATIENT)
Dept: ORTHOPEDIC SURGERY | Facility: CLINIC | Age: 86
End: 2021-01-01

## 2021-01-01 VITALS
BODY MASS INDEX: 16.76 KG/M2 | RESPIRATION RATE: 12 BRPM | WEIGHT: 98.2 LBS | HEART RATE: 77 BPM | HEIGHT: 64 IN | TEMPERATURE: 97 F | OXYGEN SATURATION: 94 % | DIASTOLIC BLOOD PRESSURE: 62 MMHG | SYSTOLIC BLOOD PRESSURE: 140 MMHG

## 2021-01-01 VITALS
DIASTOLIC BLOOD PRESSURE: 78 MMHG | SYSTOLIC BLOOD PRESSURE: 118 MMHG | WEIGHT: 100 LBS | HEART RATE: 89 BPM | BODY MASS INDEX: 17.07 KG/M2 | TEMPERATURE: 98 F | HEIGHT: 64 IN

## 2021-01-01 VITALS
WEIGHT: 99.2 LBS | HEART RATE: 77 BPM | OXYGEN SATURATION: 95 % | BODY MASS INDEX: 16.94 KG/M2 | DIASTOLIC BLOOD PRESSURE: 76 MMHG | HEIGHT: 64 IN | SYSTOLIC BLOOD PRESSURE: 144 MMHG | TEMPERATURE: 97.4 F

## 2021-01-01 VITALS
HEART RATE: 72 BPM | DIASTOLIC BLOOD PRESSURE: 80 MMHG | BODY MASS INDEX: 17.07 KG/M2 | WEIGHT: 100 LBS | SYSTOLIC BLOOD PRESSURE: 140 MMHG | TEMPERATURE: 96.9 F | HEIGHT: 64 IN | OXYGEN SATURATION: 99 %

## 2021-01-01 DIAGNOSIS — J44.9 CHRONIC OBSTRUCTIVE PULMONARY DISEASE, UNSPECIFIED COPD TYPE (HCC): Primary | ICD-10-CM

## 2021-01-01 DIAGNOSIS — S22.040D COMPRESSION FRACTURE OF T4 VERTEBRA WITH ROUTINE HEALING, SUBSEQUENT ENCOUNTER: ICD-10-CM

## 2021-01-01 DIAGNOSIS — I10 HTN (HYPERTENSION), BENIGN: ICD-10-CM

## 2021-01-01 DIAGNOSIS — R06.09 DYSPNEA ON EXERTION: ICD-10-CM

## 2021-01-01 DIAGNOSIS — S22.040A CLOSED WEDGE COMPRESSION FRACTURE OF T4 VERTEBRA, INITIAL ENCOUNTER (HCC): Primary | ICD-10-CM

## 2021-01-01 DIAGNOSIS — E87.1 HYPONATREMIA: ICD-10-CM

## 2021-01-01 DIAGNOSIS — M95.4 DEFORMITY OF STERNUM: ICD-10-CM

## 2021-01-01 DIAGNOSIS — S22.21XA CLOSED FRACTURE OF MANUBRIUM, INITIAL ENCOUNTER: Primary | ICD-10-CM

## 2021-01-01 DIAGNOSIS — S22.040A COMPRESSION FRACTURE OF T4 VERTEBRA, INITIAL ENCOUNTER (HCC): Primary | ICD-10-CM

## 2021-01-01 DIAGNOSIS — Z88.9 HX OF SEASONAL ALLERGIES: ICD-10-CM

## 2021-01-01 DIAGNOSIS — R93.7 ABNORMAL FINDINGS ON DIAGNOSTIC IMAGING OF OTHER PARTS OF MUSCULOSKELETAL SYSTEM: ICD-10-CM

## 2021-01-01 DIAGNOSIS — J44.9 CHRONIC OBSTRUCTIVE PULMONARY DISEASE, UNSPECIFIED COPD TYPE (HCC): ICD-10-CM

## 2021-01-01 DIAGNOSIS — I10 PRIMARY HYPERTENSION: ICD-10-CM

## 2021-01-01 DIAGNOSIS — S22.040D COMPRESSION FRACTURE OF T4 VERTEBRA WITH ROUTINE HEALING, SUBSEQUENT ENCOUNTER: Primary | ICD-10-CM

## 2021-01-01 DIAGNOSIS — R06.2 WHEEZING: ICD-10-CM

## 2021-01-01 LAB
BUN SERPL-MCNC: 20 MG/DL (ref 8–23)
BUN/CREAT SERPL: 25.6 (ref 7–25)
CALCIUM SERPL-MCNC: 9 MG/DL (ref 8.2–9.6)
CHLORIDE SERPL-SCNC: 94 MMOL/L (ref 98–107)
CO2 SERPL-SCNC: 26.9 MMOL/L (ref 22–29)
CREAT SERPL-MCNC: 0.78 MG/DL (ref 0.57–1)
GLUCOSE SERPL-MCNC: 74 MG/DL (ref 65–99)
POTASSIUM SERPL-SCNC: 4.8 MMOL/L (ref 3.5–5.2)
SODIUM SERPL-SCNC: 131 MMOL/L (ref 136–145)

## 2021-01-01 PROCEDURE — 99214 OFFICE O/P EST MOD 30 MIN: CPT | Performed by: NURSE PRACTITIONER

## 2021-01-01 PROCEDURE — 71120 X-RAY EXAM BREASTBONE 2/>VWS: CPT

## 2021-01-01 PROCEDURE — 99203 OFFICE O/P NEW LOW 30 MIN: CPT | Performed by: NEUROLOGICAL SURGERY

## 2021-01-01 RX ORDER — AMLODIPINE BESYLATE 5 MG/1
5 TABLET ORAL DAILY
Qty: 90 TABLET | Refills: 1 | Status: SHIPPED | OUTPATIENT
Start: 2021-01-01

## 2021-01-01 RX ORDER — FLUTICASONE PROPIONATE 50 MCG
SPRAY, SUSPENSION (ML) NASAL
Qty: 16 ML | Refills: 5 | Status: SHIPPED | OUTPATIENT
Start: 2021-01-01

## 2021-01-01 RX ORDER — MONTELUKAST SODIUM 10 MG/1
10 TABLET ORAL EVERY EVENING
Qty: 90 TABLET | Refills: 3 | Status: SHIPPED | OUTPATIENT
Start: 2021-01-01

## 2021-01-01 RX ORDER — ALBUTEROL SULFATE 90 UG/1
1-2 AEROSOL, METERED RESPIRATORY (INHALATION) EVERY 4 HOURS PRN
Qty: 18 G | Refills: 0 | Status: SHIPPED | OUTPATIENT
Start: 2021-01-01 | End: 2022-01-01

## 2021-02-04 ENCOUNTER — OFFICE VISIT (OUTPATIENT)
Dept: INTERNAL MEDICINE | Age: 86
End: 2021-02-04

## 2021-02-04 VITALS
TEMPERATURE: 97.5 F | SYSTOLIC BLOOD PRESSURE: 142 MMHG | HEIGHT: 64 IN | HEART RATE: 66 BPM | DIASTOLIC BLOOD PRESSURE: 84 MMHG | BODY MASS INDEX: 17.38 KG/M2 | WEIGHT: 101.8 LBS | OXYGEN SATURATION: 98 %

## 2021-02-04 DIAGNOSIS — E87.1 HYPONATREMIA: Primary | ICD-10-CM

## 2021-02-04 DIAGNOSIS — I10 ESSENTIAL HYPERTENSION: ICD-10-CM

## 2021-02-04 PROCEDURE — 99214 OFFICE O/P EST MOD 30 MIN: CPT | Performed by: NURSE PRACTITIONER

## 2021-02-04 RX ORDER — ACETAMINOPHEN 325 MG/1
650 TABLET ORAL EVERY 6 HOURS PRN
COMMUNITY

## 2021-02-04 NOTE — PROGRESS NOTES
"Chief Complaint  Establish Care, Hypertension, and Hyperlipidemia    Subjective          Sharonda Ridley presents to [facility] for   Hypertension  This is a chronic problem. The problem is controlled. Pertinent negatives include no anxiety, blurred vision, chest pain, headaches, malaise/fatigue, neck pain, orthopnea, palpitations, peripheral edema, PND, shortness of breath or sweats. Current antihypertension treatment includes calcium channel blockers. There are no compliance problems.    Hyperlipidemia  The problem is controlled. Pertinent negatives include no chest pain or shortness of breath.     Hyponatremia: Sodium level up last check was 126.  At that time, her previous PCP discontinued her ACE inhibitor in hopes that this would help improve her sodium level.  Review of her chart does show that she has had a chronically low sodium level of 130 range for at least 4 to 5 years.   She denies any muscle weakness, confusion, swelling.     Objective   Vital Signs:   /84   Pulse 66   Temp 97.5 °F (36.4 °C) (Temporal)   Ht 162.6 cm (64.02\")   Wt 46.2 kg (101 lb 12.8 oz)   SpO2 98%   BMI 17.47 kg/m²     Physical Exam   Result Review :   The following data was reviewed by: CARITO Bateman on 02/04/2021:  Common labs    Common Labsle 7/23/20 7/23/20 8/5/20 12/21/20 12/21/20    1613 1613  1456 1456   Glucose   76 99    BUN  17 16 19    Creatinine  0.82 0.73 0.79    eGFR Non  Am  65 75 68    eGFR African Am   91 83    Sodium  126 (A) 126 (A) 123 (A)    Potassium  4.8 5.2 4.9    Chloride  92 (A) 92 (A) 90 (A)    Calcium  9.2 8.8 9.2    Total Protein    7.1    Albumin  4.30  4.20    Total Bilirubin  0.4  0.5    Alkaline Phosphatase  75  79    AST (SGOT)  28  30    ALT (SGPT)  22  25    WBC 9.15       Hemoglobin 13.0       Hematocrit 39.3       Platelets 385       Hemoglobin A1C     5.60   (A) Abnormal value       Comments are available for some flowsheets but are not " being displayed.           Data reviewed: Previous PCP progress notes          Assessment and Plan    Problem List Items Addressed This Visit        Cardiac and Vasculature    BP (high blood pressure)       Genitourinary and Reproductive     Hyponatremia - Primary    Relevant Orders    Basic metabolic panel        1. Hyponatremia  Recheck labs today.  I did recommend patient further evaluation, including urine testing and serum osmolality, but patient wants to defer at this time. Denies any excessive thirst, excessive water intake (about 6 glasses/day). She does have a reverse osmosis system at home; while I do not think that this would cause a clinically significant hyponatremia, it does remove any impurities and minerals from the water, including sodium.  Advised her to try to increase sodium in her diet as overall she tends to avoid usage of any excessive salt.    - Basic metabolic panel    2. Essential hypertension  Blood pressures at home have been under appropriate control with recent discontinuation of ACE inhibitor.  She has an appointment scheduled end of next month for her regular follow-up, advised her to keep this appointment.  Continue to monitor blood pressure at home.      Follow Up   No follow-ups on file.  Patient was given instructions and counseling regarding her condition or for health maintenance advice. Please see specific information pulled into the AVS if appropriate.

## 2021-02-04 NOTE — PATIENT INSTRUCTIONS
Hyponatremia    Hyponatremia is when the amount of salt (sodium) in your blood is too low. When sodium levels are low, your cells absorb extra water, which causes them to swell. The swelling happens throughout the body, but it mostly affects the brain.  What are the causes?  This condition may be caused by:  · Certain medical conditions, such as:  ? Heart, kidney, or liver problems.  ? Thyroid problems.  ? Adrenal gland problems.  ? Metabolic conditions, such as Sycamore disease or syndrome of inappropriate antidiuretic hormone (SIADH).  · Severe vomiting or diarrhea.  · Certain medicines or illegal drugs.  · Dehydration.  · Drinking too much water.  · Eating a diet that is low in sodium.  · Large burns on your body.  · Excessive sweating.  What increases the risk?  You are more likely to develop this condition if you:  · Have long-term (chronic) kidney disease.  · Have heart failure.  · Have a medical condition that causes frequent or excessive diarrhea.  · Participate in intense physical activities, such as marathon running.  · Take certain medicines that affect the sodium and fluid balance in the blood. Some of these medicine types include:  ? Diuretics.  ? NSAIDs.  ? Some opioid pain medicines.  ? Some antidepressants.  ? Some seizure prevention medicines.  What are the signs or symptoms?  Symptoms of this condition include:  · Headache.  · Nausea and vomiting.  · Being very tired (lethargic).  · Muscle weakness and cramping.  · Loss of appetite.  · Feeling weak or light-headed.  Severe symptoms of this condition include:  · Confusion.  · Agitation.  · Having a rapid heart rate.  · Passing out (fainting).  · Seizures.  · Coma.  How is this diagnosed?  This condition is diagnosed based on:  · A physical exam.  · Your medical history.  · Tests, including:  ? Blood tests.  ? Urine tests.  How is this treated?  Treatment for this condition depends on the cause. Treatment may include:  · Getting fluids through an IV  LVM for JACINDA, physical therapy to continue for additional 8 weeks PER DR LINDSEY ROMERO JR., VERBAL ORDER. that is inserted into one of your veins.  · Medicines to correct the sodium imbalance. If medicines are causing the condition, the medicines will need to be adjusted.  · Limiting your water or fluid intake to get the correct sodium balance.  · Monitoring in the hospital setting to closely watch your symptoms for improvement.  Follow these instructions at home:    · Take over-the-counter and prescription medicines only as told by your health care provider. Many medicines can make this condition worse. Talk with your health care provider about any medicines that you are currently taking.  · Carefully follow a recommended diet as told by your health care provider.  · Carefully follow instructions from your health care provider about fluid restrictions.  · Do not drink alcohol.  · Keep all follow-up visits as told by your health care provider. This is important.  Contact a health care provider if:  · You develop worsening nausea, fatigue, headache, confusion, or weakness.  · Your symptoms go away and then return.  · You have problems following the recommended diet.  Get help right away if:  · You have a seizure.  · You pass out.  · You have ongoing diarrhea or vomiting.  Summary  · Hyponatremia is when the amount of salt (sodium) in your blood is too low.  · When sodium levels are low, your cells absorb extra water, which causes them to swell.  · The swelling happens throughout the body, but it mostly affects the brain.  · Treatment for this condition depends on the cause. It may include IV fluids, medicines, and limiting your fluid intake.  This information is not intended to replace advice given to you by your health care provider. Make sure you discuss any questions you have with your health care provider.  Document Revised: 11/01/2019 Document Reviewed: 11/01/2019  Elsevier Patient Education © 2020 [employer]

## 2021-02-05 LAB
BUN SERPL-MCNC: 18 MG/DL (ref 8–23)
BUN/CREAT SERPL: 24 (ref 7–25)
CALCIUM SERPL-MCNC: 9.4 MG/DL (ref 8.2–9.6)
CHLORIDE SERPL-SCNC: 97 MMOL/L (ref 98–107)
CO2 SERPL-SCNC: 29.3 MMOL/L (ref 22–29)
CREAT SERPL-MCNC: 0.75 MG/DL (ref 0.57–1)
GLUCOSE SERPL-MCNC: 96 MG/DL (ref 65–99)
POTASSIUM SERPL-SCNC: 4.6 MMOL/L (ref 3.5–5.2)
SODIUM SERPL-SCNC: 135 MMOL/L (ref 136–145)

## 2021-02-22 ENCOUNTER — TELEPHONE (OUTPATIENT)
Dept: INTERNAL MEDICINE | Age: 86
End: 2021-02-22

## 2021-02-22 DIAGNOSIS — R06.09 DYSPNEA ON EXERTION: ICD-10-CM

## 2021-02-22 DIAGNOSIS — I10 HTN (HYPERTENSION), BENIGN: ICD-10-CM

## 2021-02-22 RX ORDER — MONTELUKAST SODIUM 10 MG/1
10 TABLET ORAL EVERY EVENING
Qty: 90 TABLET | Refills: 3 | Status: SHIPPED | OUTPATIENT
Start: 2021-02-22 | End: 2021-01-01 | Stop reason: SDUPTHER

## 2021-02-22 RX ORDER — AMLODIPINE BESYLATE 5 MG/1
5 TABLET ORAL DAILY
Qty: 90 TABLET | Refills: 1 | Status: SHIPPED | OUTPATIENT
Start: 2021-02-22 | End: 2021-08-03

## 2021-02-22 NOTE — TELEPHONE ENCOUNTER
Contact patient.     I'm not sure what the question is.   Dr. Martínez stopped her lisinopril back in December because it was contributing to her low sodium.     She should be taking her amlodipine 5mg, not lisinopril.    Please clarify.

## 2021-02-22 NOTE — TELEPHONE ENCOUNTER
Pt start back talking  lisinopril 20mg due to her Bp being low and she was dizzy. She states that her Bp has been in the normal range while taking both Bp medication.

## 2021-02-22 NOTE — TELEPHONE ENCOUNTER
Caller: Sharonda Ridley    Relationship to patient: Self    Best call back number: [phone]    Patient is needing: PATIENT STATES THAT SHE WAS UNSURE ABOUT ONE OF HER MEDICATION DIRECTIONS. PLEASE CALL.

## 2021-02-23 NOTE — TELEPHONE ENCOUNTER
Pt feels that the lisinopril is helping keep her Bp stable. And that the amlodipine is to low and that why she having dizzy spells.

## 2021-02-24 NOTE — TELEPHONE ENCOUNTER
Pt was notified of CARITO Davidson dictation.  Pt stated that she will stop the Lisinopril as told.   She will monitor B/P and gabriel an appt prn.  Pt is not experiencing any ill side effects as of today.

## 2021-02-24 NOTE — TELEPHONE ENCOUNTER
Contact patient.     She needs to STOP the lisinopril. This was discussed by Dr. Martínez and myself as the cause of her low sodium.     She was not having any issues with amlodipine at her last office visit.     If she is having issues with amlodipine making her dizzy, have her schedule a follow up. What is her Blood pressure on the amlodipine alone?

## 2021-03-15 ENCOUNTER — TELEPHONE (OUTPATIENT)
Dept: INTERNAL MEDICINE | Age: 86
End: 2021-03-15

## 2021-03-15 NOTE — TELEPHONE ENCOUNTER
PATIENT HAS BEEN TRACKING HER BP EVERY OTHER DAY FOR 7 DAYS AS THIS WAS REQUESTED PER BALBIR IGLESIAS AFTER SHE LOWERED PATIENT LISINOPRIL FROM 40MG TO 5MG.     BELOW ARE PATIENTS RESULTS    2/25 140/59    2/27-- 130/49    03/01   140/53    03/05    139/52    03/08     136/54    03/09      141/56    03/11      132/56    03/15       135/51    PATIENT SAID SHE WILL CONTINUE FOR 7 MORE    PATIENT CAN BE REACHED AT: [phone]

## 2021-03-15 NOTE — TELEPHONE ENCOUNTER
Contact patient.     Blood pressures look fine. She is to continue the amlodipine 5mg- she is NOT taking lisinopril, that is the one Dr. Martínez took her off of.

## 2021-04-08 ENCOUNTER — OFFICE VISIT (OUTPATIENT)
Dept: INTERNAL MEDICINE | Age: 86
End: 2021-04-08

## 2021-04-08 VITALS
HEART RATE: 80 BPM | OXYGEN SATURATION: 94 % | DIASTOLIC BLOOD PRESSURE: 82 MMHG | WEIGHT: 102 LBS | SYSTOLIC BLOOD PRESSURE: 144 MMHG | BODY MASS INDEX: 17.42 KG/M2 | TEMPERATURE: 97.7 F | HEIGHT: 64 IN

## 2021-04-08 DIAGNOSIS — I10 HTN (HYPERTENSION), BENIGN: Primary | ICD-10-CM

## 2021-04-08 DIAGNOSIS — E87.1 HYPONATREMIA: ICD-10-CM

## 2021-04-08 DIAGNOSIS — R09.81 NASAL CONGESTION: ICD-10-CM

## 2021-04-08 PROCEDURE — 99214 OFFICE O/P EST MOD 30 MIN: CPT | Performed by: NURSE PRACTITIONER

## 2021-04-08 RX ORDER — IPRATROPIUM BROMIDE 21 UG/1
2 SPRAY, METERED NASAL EVERY 12 HOURS
Qty: 30 ML | Refills: 12 | Status: SHIPPED | OUTPATIENT
Start: 2021-04-08 | End: 2021-04-15

## 2021-04-08 NOTE — PROGRESS NOTES
"Chief Complaint  Hypertension and Hyperlipidemia    Subjective          Sharonda Ridley presents to [facility]  Hypertension  This is a chronic problem. The problem is controlled (Reports home BP 130s/50-60s). Pertinent negatives include no anxiety, blurred vision, chest pain, headaches, peripheral edema (sinus), PND, shortness of breath or sweats. Current antihypertension treatment includes calcium channel blockers. There are no compliance problems.    Sinus Problem  This is a chronic problem. Associated symptoms include congestion. Pertinent negatives include no coughing, headaches or shortness of breath. (\"excessive nasal congestion at nighttime\" ) Treatments tried: Currently using antihistamine (Allegra), singulair, Fluticasone nasal spray.       Objective   Vital Signs:   /82   Pulse 80   Temp 97.7 °F (36.5 °C) (Temporal)   Ht 162.6 cm (64.02\")   Wt 46.3 kg (102 lb)   SpO2 94%   BMI 17.50 kg/m²     Physical Exam  Vitals and nursing note reviewed.   Constitutional:       Appearance: She is well-developed.   Neurological:      Mental Status: She is alert and oriented to person, place, and time. She is not disoriented.   Psychiatric:         Attention and Perception: She is attentive.         Speech: Speech normal.         Behavior: Behavior normal. Behavior is cooperative.         Thought Content: Thought content normal.         Judgment: Judgment normal.        Result Review :   The following data was reviewed by: CARITO Bateman on 04/08/2021:  Common labs    Common Labsle 8/5/20 12/21/20 12/21/20 2/4/21     1456 1456    Glucose 76 99  96   BUN 16 19  18   Creatinine 0.73 0.79  0.75   eGFR Non  Am 75 68  73   eGFR  Am 91 83  88   Sodium 126 (A) 123 (A)  135 (A)   Potassium 5.2 4.9  4.6   Chloride 92 (A) 90 (A)  97 (A)   Calcium 8.8 9.2  9.4   Total Protein  7.1     Albumin  4.20     Total Bilirubin  0.5     Alkaline Phosphatase  79     AST (SGOT)  " 30     ALT (SGPT)  25     Hemoglobin A1C   5.60    (A) Abnormal value       Comments are available for some flowsheets but are not being displayed.                  Assessment and Plan    Diagnoses and all orders for this visit:    1. HTN (hypertension), benign (Primary)  Blood pressure readings at home are of tolerating removal of ACE inhibitor, which was causing hyponatremia.  Continue current dosage of amlodipine and monitoring blood pressure at home.     2. Nasal congestion  Continue all current allergy medications, addition of ipratropium nasal spray at nighttime and up to twice daily as needed.    -     ipratropium (ATROVENT) 0.03 % nasal spray; 2 sprays into the nostril(s) as directed by provider Every 12 (Twelve) Hours.  Dispense: 30 mL; Refill: 12    3. Hyponatremia  Resolved since discontinuation of ACE inhibitor.  We will recheck labs next office visit.         Follow Up   Return in about 4 months (around 7/26/2021) for Next scheduled follow up (fasting labs same day) .  Patient was given instructions and counseling regarding her condition or for health maintenance advice. Please see specific information pulled into the AVS if appropriate.

## 2021-04-14 ENCOUNTER — TELEPHONE (OUTPATIENT)
Dept: INTERNAL MEDICINE | Age: 86
End: 2021-04-14

## 2021-04-14 DIAGNOSIS — R09.81 NASAL CONGESTION: Primary | ICD-10-CM

## 2021-04-15 RX ORDER — AZELASTINE 1 MG/ML
1 SPRAY, METERED NASAL 2 TIMES DAILY
Qty: 1 EACH | Refills: 0 | Status: SHIPPED | OUTPATIENT
Start: 2021-04-15 | End: 2022-01-01

## 2021-04-15 NOTE — TELEPHONE ENCOUNTER
Sent RX for a different spray.     She will continue the current fluticasone that she already has and this new one (azelastine) will be twice a day.

## 2021-04-19 DIAGNOSIS — Z88.9 HX OF SEASONAL ALLERGIES: ICD-10-CM

## 2021-04-19 RX ORDER — FLUTICASONE PROPIONATE 50 MCG
2 SPRAY, SUSPENSION (ML) NASAL DAILY
Qty: 16 G | Refills: 2 | Status: SHIPPED | OUTPATIENT
Start: 2021-04-19 | End: 2021-01-01

## 2021-04-23 ENCOUNTER — OFFICE VISIT (OUTPATIENT)
Dept: INTERNAL MEDICINE | Age: 86
End: 2021-04-23

## 2021-04-23 VITALS
DIASTOLIC BLOOD PRESSURE: 68 MMHG | HEIGHT: 64 IN | WEIGHT: 103.6 LBS | SYSTOLIC BLOOD PRESSURE: 172 MMHG | TEMPERATURE: 97.7 F | BODY MASS INDEX: 17.69 KG/M2 | OXYGEN SATURATION: 98 % | HEART RATE: 75 BPM

## 2021-04-23 DIAGNOSIS — N30.00 ACUTE CYSTITIS WITHOUT HEMATURIA: Primary | ICD-10-CM

## 2021-04-23 LAB
BILIRUB BLD-MCNC: NEGATIVE MG/DL
CLARITY, POC: ABNORMAL
COLOR UR: ABNORMAL
GLUCOSE UR STRIP-MCNC: NEGATIVE MG/DL
KETONES UR QL: NEGATIVE
LEUKOCYTE EST, POC: ABNORMAL
NITRITE UR-MCNC: NEGATIVE MG/ML
PH UR: 7 [PH] (ref 5–8)
PROT UR STRIP-MCNC: NEGATIVE MG/DL
RBC # UR STRIP: NEGATIVE /UL
SP GR UR: 1.02 (ref 1–1.03)
UROBILINOGEN UR QL: NORMAL

## 2021-04-23 PROCEDURE — 81003 URINALYSIS AUTO W/O SCOPE: CPT | Performed by: NURSE PRACTITIONER

## 2021-04-23 PROCEDURE — 99213 OFFICE O/P EST LOW 20 MIN: CPT | Performed by: NURSE PRACTITIONER

## 2021-04-23 RX ORDER — CIPROFLOXACIN 250 MG/1
250 TABLET, FILM COATED ORAL 2 TIMES DAILY
Qty: 6 TABLET | Refills: 0 | Status: SHIPPED | OUTPATIENT
Start: 2021-04-23 | End: 2021-04-26

## 2021-04-23 NOTE — PROGRESS NOTES
"    I N T E R N A L  M E D I C I N E  GUNJAN HAYWOOD, APRN      ENCOUNTER DATE:  04/23/2021    Sharonda Ridley / 89 y.o. / female      CHIEF COMPLAINT / REASON FOR OFFICE VISIT     Urinary Tract Infection (burning, foul smell)      ASSESSMENT & PLAN     1. Acute cystitis without hematuria  - Cipro 250mg BID for 3 days with allergies to both penicillins and sulfa antibiotics and increased age of 89  - Urinalysis With Culture If Indicated - Urine, Clean Catch  - POCT urinalysis dipstick, automated    Orders Placed This Encounter   Procedures   • Urinalysis With Culture If Indicated - Urine, Clean Catch   • POCT urinalysis dipstick, automated     New Medications Ordered This Visit   Medications   • ciprofloxacin (Cipro) 250 MG tablet     Sig: Take 1 tablet by mouth 2 (Two) Times a Day for 3 days.     Dispense:  6 tablet     Refill:  0       SUMMARY/DISCUSSION  • Follow-up if symptoms do not improve or worsen      Next Appointment with me: Visit date not found    No follow-ups on file.      VITAL SIGNS     Visit Vitals  /68   Pulse 75   Temp 97.7 °F (36.5 °C) (Temporal)   Ht 162.6 cm (64.02\")   Wt 47 kg (103 lb 9.6 oz)   LMP  (LMP Unknown)   SpO2 98%   BMI 17.77 kg/m²         Wt Readings from Last 3 Encounters:   04/23/21 47 kg (103 lb 9.6 oz)   04/08/21 46.3 kg (102 lb)   02/04/21 46.2 kg (101 lb 12.8 oz)     Body mass index is 17.77 kg/m².      MEDICATIONS AT THE TIME OF OFFICE VISIT     Current Outpatient Medications on File Prior to Visit   Medication Sig   • acetaminophen (TYLENOL) 325 MG tablet Take 650 mg by mouth Every 6 (Six) Hours As Needed for Mild Pain .   • amLODIPine (NORVASC) 5 MG tablet Take 1 tablet by mouth Daily.   • aspirin 81 MG EC tablet Take 81 mg by mouth Daily.   • atorvastatin (LIPITOR) 10 MG tablet Take 1 tablet by mouth Daily.   • montelukast (SINGULAIR) 10 MG tablet Take 1 tablet by mouth Every Evening.   • albuterol sulfate  (90 Base) MCG/ACT inhaler Inhale 1-2 puffs Every 4 " (Four) Hours As Needed for Wheezing or Shortness of Air.   • azelastine (ASTELIN) 0.1 % nasal spray 1 spray into the nostril(s) as directed by provider 2 (Two) Times a Day. Use in each nostril as directed   • fexofenadine (ALLEGRA) 180 MG tablet Take 1 tablet by mouth Daily.   • fluticasone (FLONASE) 50 MCG/ACT nasal spray 2 sprays by Each Nare route Daily.   • ibuprofen (ADVIL,MOTRIN) 200 MG tablet Take 200 mg by mouth Every 6 (Six) Hours As Needed for Mild Pain .   • Spacer/Aero-Holding Chambers (OptiChamber Marnie) misc USE DEVICE WITH INHALER EVERY 4 HOURS AS NEEDED FOR SHORTNESS OF BREATH/WHEEZING     No current facility-administered medications on file prior to visit.         HISTORY OF PRESENT ILLNESS     Patient presents for history of 3 days of dysuria with foul-smelling urine.  No back pain or flank pain.  No fever, chills or malaise.    REVIEW OF SYSTEMS     Constitutional neg except per HPI   Resp neg  CV neg   dysuria     PHYSICAL EXAMINATION     Physical Exam  Constitutional  No distress  Cardiovascular Rate  normal . Rhythm: regular . Heart sounds:  normal  Pulmonary/Chest  Effort normal. Breath sounds:  normal  Musc negative CVA tenderness   Psychiatric  Alert. Judgment and thought content normal. Mood normal       REVIEWED DATA     Labs:   Brief Urine Lab Results  (Last result in the past 365 days)      Color   Clarity   Blood   Leuk Est   Nitrite   Protein   CREAT   Urine HCG        04/23/21 1556 Dark Yellow Cloudy Negative Small (1+) Negative Negative                 Imaging:           Medical Tests:             Summary of old records / correspondence / consultant report:           Request outside records:           *Examiner was wearing medical surgical mask, face shield and exam gloves during the entire duration of the visit. Patient was masked the entire time.   Minimum social distance of 6 ft maintained entire visit except if physical contact was necessary as documented.     **Sharon  Disclaimer:   Much of this encounter note is an electronic transcription/translation of spoken language to printed text. The electronic translation of spoken language may permit erroneous, or at times, nonsensical words or phrases to be inadvertently transcribed. Although I have reviewed the note for such errors, some may still exist.

## 2021-04-26 ENCOUNTER — TELEPHONE (OUTPATIENT)
Dept: INTERNAL MEDICINE | Age: 86
End: 2021-04-26

## 2021-04-26 LAB
BACTERIA UR CULT: ABNORMAL
BACTERIA UR CULT: ABNORMAL
OTHER ANTIBIOTIC SUSC ISLT: ABNORMAL

## 2021-04-26 NOTE — TELEPHONE ENCOUNTER
----- Message from CARITO Morrison sent at 4/26/2021  7:54 AM EDT -----  No need to modify antibiotic. Cipro will treat current e coli infection.     Take care.

## 2021-04-30 DIAGNOSIS — E78.2 MIXED HYPERLIPIDEMIA: ICD-10-CM

## 2021-04-30 RX ORDER — ATORVASTATIN CALCIUM 10 MG/1
10 TABLET, FILM COATED ORAL DAILY
Qty: 90 TABLET | Refills: 2 | Status: SHIPPED | OUTPATIENT
Start: 2021-04-30 | End: 2022-01-01 | Stop reason: SDUPTHER

## 2021-06-17 ENCOUNTER — TELEPHONE (OUTPATIENT)
Dept: INTERNAL MEDICINE | Age: 86
End: 2021-06-17

## 2021-06-17 NOTE — TELEPHONE ENCOUNTER
Discomfort in her upper back, family states grandmother is hunching over due to pain, pain in right arm for the last two months, not sleeping well at night. Patient taking 1 Advil once daily.   Diarrhea  ,depression,anxiety   Pt has been on medication for anxiety, in the past.

## 2021-06-17 NOTE — TELEPHONE ENCOUNTER
Please let family know concerns have been noted. I will attempt to address these during office visit.

## 2021-06-17 NOTE — TELEPHONE ENCOUNTER
Caller: Amy Rodriguez/ELLEN BARBOZA    Relationship: Emergency Contact    Best call back number:[phone]  What is the best time to reach you: TODAY    Who are you requesting to speak with (clinical staff, provider,  specific staff member): BALBIR IGLESIAS     Do you know the name of the person who called: AMY CALLED BUT CALL BACK ELLEN PLEASE     What was the call regarding: WORRIED ABOUT GRANDMOTHER AND NEED TO SPEAK WITH YOU BEFORE HER APPT TOMORROW AT 2. SHE STATES THEY ARE POA FOR HER.  Do you require a callback: YES

## 2021-06-21 ENCOUNTER — APPOINTMENT (OUTPATIENT)
Dept: GENERAL RADIOLOGY | Facility: HOSPITAL | Age: 86
End: 2021-06-21

## 2021-06-21 ENCOUNTER — HOSPITAL ENCOUNTER (EMERGENCY)
Facility: HOSPITAL | Age: 86
Discharge: HOME | End: 2021-06-21
Attending: EMERGENCY MEDICINE | Admitting: EMERGENCY MEDICINE

## 2021-06-21 VITALS
HEIGHT: 64 IN | BODY MASS INDEX: 17.75 KG/M2 | SYSTOLIC BLOOD PRESSURE: 189 MMHG | TEMPERATURE: 98.4 F | DIASTOLIC BLOOD PRESSURE: 94 MMHG | OXYGEN SATURATION: 94 % | HEART RATE: 72 BPM | WEIGHT: 104 LBS | RESPIRATION RATE: 16 BRPM

## 2021-06-21 DIAGNOSIS — M48.54XS: ICD-10-CM

## 2021-06-21 DIAGNOSIS — J44.9 CHRONIC OBSTRUCTIVE PULMONARY DISEASE, UNSPECIFIED COPD TYPE (HCC): ICD-10-CM

## 2021-06-21 DIAGNOSIS — M48.54XA NONTRAUMATIC COMPRESSION FRACTURE OF T4 VERTEBRA, INITIAL ENCOUNTER (HCC): Primary | ICD-10-CM

## 2021-06-21 LAB
ALBUMIN SERPL-MCNC: 4.1 G/DL (ref 3.5–5.2)
ALBUMIN/GLOB SERPL: 1.6 G/DL
ALP SERPL-CCNC: 83 U/L (ref 39–117)
ALT SERPL W P-5'-P-CCNC: 22 U/L (ref 1–33)
ANION GAP SERPL CALCULATED.3IONS-SCNC: 9.1 MMOL/L (ref 5–15)
AST SERPL-CCNC: 26 U/L (ref 1–32)
BASOPHILS # BLD AUTO: 0.05 10*3/MM3 (ref 0–0.2)
BASOPHILS NFR BLD AUTO: 0.6 % (ref 0–1.5)
BILIRUB SERPL-MCNC: 0.3 MG/DL (ref 0–1.2)
BUN SERPL-MCNC: 19 MG/DL (ref 8–23)
BUN/CREAT SERPL: 27.1 (ref 7–25)
CALCIUM SPEC-SCNC: 8.9 MG/DL (ref 8.2–9.6)
CHLORIDE SERPL-SCNC: 96 MMOL/L (ref 98–107)
CO2 SERPL-SCNC: 28.9 MMOL/L (ref 22–29)
CREAT SERPL-MCNC: 0.7 MG/DL (ref 0.57–1)
D DIMER PPP FEU-MCNC: 0.9 MCGFEU/ML (ref 0–0.49)
DEPRECATED RDW RBC AUTO: 44.5 FL (ref 37–54)
EOSINOPHIL # BLD AUTO: 0.06 10*3/MM3 (ref 0–0.4)
EOSINOPHIL NFR BLD AUTO: 0.7 % (ref 0.3–6.2)
ERYTHROCYTE [DISTWIDTH] IN BLOOD BY AUTOMATED COUNT: 13.6 % (ref 12.3–15.4)
GFR SERPL CREATININE-BSD FRML MDRD: 78 ML/MIN/1.73
GLOBULIN UR ELPH-MCNC: 2.6 GM/DL
GLUCOSE BLDC GLUCOMTR-MCNC: 89 MG/DL (ref 70–130)
GLUCOSE SERPL-MCNC: 69 MG/DL (ref 65–99)
HCT VFR BLD AUTO: 41.1 % (ref 34–46.6)
HGB BLD-MCNC: 13.4 G/DL (ref 12–15.9)
HOLD SPECIMEN: NORMAL
HOLD SPECIMEN: NORMAL
IMM GRANULOCYTES # BLD AUTO: 0.02 10*3/MM3 (ref 0–0.05)
IMM GRANULOCYTES NFR BLD AUTO: 0.2 % (ref 0–0.5)
LYMPHOCYTES # BLD AUTO: 2.21 10*3/MM3 (ref 0.7–3.1)
LYMPHOCYTES NFR BLD AUTO: 27.3 % (ref 19.6–45.3)
MCH RBC QN AUTO: 29.3 PG (ref 26.6–33)
MCHC RBC AUTO-ENTMCNC: 32.6 G/DL (ref 31.5–35.7)
MCV RBC AUTO: 89.7 FL (ref 79–97)
MONOCYTES # BLD AUTO: 0.73 10*3/MM3 (ref 0.1–0.9)
MONOCYTES NFR BLD AUTO: 9 % (ref 5–12)
NEUTROPHILS NFR BLD AUTO: 5.04 10*3/MM3 (ref 1.7–7)
NEUTROPHILS NFR BLD AUTO: 62.2 % (ref 42.7–76)
NRBC BLD AUTO-RTO: 0 /100 WBC (ref 0–0.2)
NT-PROBNP SERPL-MCNC: 860.3 PG/ML (ref 0–1800)
PLATELET # BLD AUTO: 394 10*3/MM3 (ref 140–450)
PMV BLD AUTO: 9.1 FL (ref 6–12)
POTASSIUM SERPL-SCNC: 4.2 MMOL/L (ref 3.5–5.2)
PROT SERPL-MCNC: 6.7 G/DL (ref 6–8.5)
QT INTERVAL: 424 MS
RBC # BLD AUTO: 4.58 10*6/MM3 (ref 3.77–5.28)
SODIUM SERPL-SCNC: 134 MMOL/L (ref 136–145)
TROPONIN T SERPL-MCNC: <0.01 NG/ML (ref 0–0.03)
WBC # BLD AUTO: 8.11 10*3/MM3 (ref 3.4–10.8)
WHOLE BLOOD HOLD SPECIMEN: NORMAL

## 2021-06-21 PROCEDURE — 93005 ELECTROCARDIOGRAM TRACING: CPT

## 2021-06-21 PROCEDURE — 94799 UNLISTED PULMONARY SVC/PX: CPT

## 2021-06-21 PROCEDURE — 93005 ELECTROCARDIOGRAM TRACING: CPT | Performed by: EMERGENCY MEDICINE

## 2021-06-21 PROCEDURE — 99283 EMERGENCY DEPT VISIT LOW MDM: CPT

## 2021-06-21 PROCEDURE — 85025 COMPLETE CBC W/AUTO DIFF WBC: CPT

## 2021-06-21 PROCEDURE — 96374 THER/PROPH/DIAG INJ IV PUSH: CPT

## 2021-06-21 PROCEDURE — 85379 FIBRIN DEGRADATION QUANT: CPT | Performed by: EMERGENCY MEDICINE

## 2021-06-21 PROCEDURE — 25010000002 KETOROLAC TROMETHAMINE PER 15 MG: Performed by: EMERGENCY MEDICINE

## 2021-06-21 PROCEDURE — 93010 ELECTROCARDIOGRAM REPORT: CPT | Performed by: INTERNAL MEDICINE

## 2021-06-21 PROCEDURE — 82962 GLUCOSE BLOOD TEST: CPT

## 2021-06-21 PROCEDURE — 80053 COMPREHEN METABOLIC PANEL: CPT

## 2021-06-21 PROCEDURE — 72072 X-RAY EXAM THORAC SPINE 3VWS: CPT

## 2021-06-21 PROCEDURE — 94640 AIRWAY INHALATION TREATMENT: CPT

## 2021-06-21 PROCEDURE — 84484 ASSAY OF TROPONIN QUANT: CPT

## 2021-06-21 PROCEDURE — 83880 ASSAY OF NATRIURETIC PEPTIDE: CPT

## 2021-06-21 PROCEDURE — 71046 X-RAY EXAM CHEST 2 VIEWS: CPT

## 2021-06-21 RX ORDER — SODIUM CHLORIDE 0.9 % (FLUSH) 0.9 %
10 SYRINGE (ML) INJECTION AS NEEDED
Status: DISCONTINUED | OUTPATIENT
Start: 2021-06-21 | End: 2021-06-21 | Stop reason: HOSPADM

## 2021-06-21 RX ORDER — ALBUTEROL SULFATE 2.5 MG/3ML
2.5 SOLUTION RESPIRATORY (INHALATION) ONCE
Status: COMPLETED | OUTPATIENT
Start: 2021-06-21 | End: 2021-06-21

## 2021-06-21 RX ORDER — KETOROLAC TROMETHAMINE 15 MG/ML
15 INJECTION, SOLUTION INTRAMUSCULAR; INTRAVENOUS ONCE
Status: COMPLETED | OUTPATIENT
Start: 2021-06-21 | End: 2021-06-21

## 2021-06-21 RX ADMIN — KETOROLAC TROMETHAMINE 15 MG: 15 INJECTION, SOLUTION INTRAMUSCULAR; INTRAVENOUS at 19:01

## 2021-06-21 RX ADMIN — ALBUTEROL SULFATE 2.5 MG: 2.5 SOLUTION RESPIRATORY (INHALATION) at 19:27

## 2021-06-21 NOTE — ED NOTES
Pt arrived via EMS with back pain and SOB that started last week and has gotten worse. 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 Pt denies any chest pain.     Pt placed in mask, this Rn in mask     Kristi Ward RN  06/21/21 [id number]

## 2021-06-21 NOTE — ED PROVIDER NOTES
EMERGENCY DEPARTMENT ENCOUNTER    Room Number:  28/28  Date of encounter:  6/21/2021  PCP: Yolanda Davidson APRN  Historian: Patient and granddaughter      HPI:  Chief Complaint: Back pain  A complete HPI/ROS/PMH/PSH/SH/FH are unobtainable due to: Not applicable  Context: Sharonda Ridley is a 89 y.o. female who presents to the ED c/o back pain that is right between her shoulder blades.  Is been going on for about 10 days.  This pain is worse when she bends over and moves and twists.  History of similar episode of back pain several months ago.  She saw her primary care doctor and was told she had arthritis and had x-rays done.  She was placed upon steroids and gradually improved.  The same pain started coming back about 10 days ago.  According to her granddaughter it his limited some of her mobility.  Granddaughter also reports that she feels that she has had some increased shortness of breath.  The patient states that she has chronic COPD and she is still an active smoker.  In the morning she has production of phlegm and increased chest congestion.  She coughs the phlegm up and then late morning and for the rest of the day she is at baseline in no distress.  She has not used her inhalers yet today.  Patient does not report really any significant change in her shortness of breath.  She states she is always short of breath with exertion and that is at baseline.  She denies any chest pain.  Denies any abdominal pain.  Denies any headache, any focal weakness to arms or legs or sensory changes.  Again denies any fevers chills.  She has her typical cough that she has more prominent in the morning from COPD.        Previous Episodes: Yes see above  Current Symptoms: Has some mild pain that is worse with movement.    MEDICAL HISTORY REVIEWED  History of COPD.  History of arthritis with some mid back pain in the past.      PAST MEDICAL HISTORY  Active Ambulatory Problems     Diagnosis Date Noted   • Breathlessness on  exertion 01/17/2016   • BP (high blood pressure) 01/17/2016   • Anxiety 01/24/2016   • Long-term use of high-risk medication 04/13/2016   • Seasonal allergic rhinitis due to pollen 11/02/2016   • Mixed hyperlipidemia 11/15/2017   • Carotid stenosis, asymptomatic, bilateral 11/15/2017   • Hyperglycemia 03/14/2018   • Hyponatremia 12/21/2020     Resolved Ambulatory Problems     Diagnosis Date Noted   • No Resolved Ambulatory Problems     Past Medical History:   Diagnosis Date   • Arthritis    • Hyperlipidemia    • Hypertension    • Methicillin resistant Staphylococcus aureus infection          PAST SURGICAL HISTORY  Past Surgical History:   Procedure Laterality Date   • ORIF PROXIMAL HUMERUS FRACTURE           FAMILY HISTORY  Family History   Problem Relation Age of Onset   • Stroke Mother    • Heart valve disorder Father    • Cancer Other         sibling         SOCIAL HISTORY  Social History     Socioeconomic History   • Marital status:      Spouse name: Not on file   • Number of children: Not on file   • Years of education: Not on file   • Highest education level: Not on file   Tobacco Use   • Smoking status: Light Tobacco Smoker     Types: Cigarettes   • Smokeless tobacco: Never Used   Vaping Use   • Vaping Use: Never used   Substance and Sexual Activity   • Alcohol use: Yes     Comment: wine    • Drug use: Never   • Sexual activity: Defer         ALLERGIES  Codeine, Sulfa antibiotics, and Penicillins        REVIEW OF SYSTEMS  Review of Systems     All systems reviewed and negative except for those discussed in HPI.       PHYSICAL EXAM    I have reviewed the triage vital signs and nursing notes.    ED Triage Vitals [06/21/21 1355]   Temp Heart Rate Resp BP SpO2   98.4 °F (36.9 °C) 95 22 176/93 95 %      Temp src Heart Rate Source Patient Position BP Location FiO2 (%)   Tympanic -- -- -- --       GENERAL: Elderly female this pleasant no acute distress.Vital signs on my initial evaluation O2 sat is 93% on  room air.  Blood pressure is mildly elevated.  Heart rate is normal.  She is afebrile.  No respiratory distress  HENT: nares patent  Head/neck/ face are symmetric without gross deformity, signs of trauma, or swelling  EYES: no scleral icterus, no conjunctival pallor.  NECK: Supple, no meningismus.  Thoracic back location of pain is right between her scapula and a little higher.  It is reproducible with palpation and leaning forward.  Normal inspection.  CV: regular rhythm, regular rate with intact distal pulses.  No murmur rub  RESPIRATORY: normal effort and no respiratory distress.  Has some mild infrequent cough that is dry.  Has a very mild expiratory wheeze.  ABDOMEN: soft and nontender.  MUSCULOSKELETAL: no deformity.  No edema.  Intact distal pulses to upper and lower extremities that are equal strong and symmetric.  NEURO: alert and appropriate, moves all extremities, follows commands.  Alert and oriented x3.  No focal motor or sensory changes.  SKIN: warm, dry    Vital signs and nursing notes reviewed.        LAB RESULTS  Recent Results (from the past 24 hour(s))   ECG 12 Lead    Collection Time: 06/21/21  3:26 PM   Result Value Ref Range    QT Interval 424 ms   Comprehensive Metabolic Panel    Collection Time: 06/21/21  4:57 PM    Specimen: Arm, Left; Blood   Result Value Ref Range    Glucose 69 65 - 99 mg/dL    BUN 19 8 - 23 mg/dL    Creatinine 0.70 0.57 - 1.00 mg/dL    Sodium 134 (L) 136 - 145 mmol/L    Potassium 4.2 3.5 - 5.2 mmol/L    Chloride 96 (L) 98 - 107 mmol/L    CO2 28.9 22.0 - 29.0 mmol/L    Calcium 8.9 8.2 - 9.6 mg/dL    Total Protein 6.7 6.0 - 8.5 g/dL    Albumin 4.10 3.50 - 5.20 g/dL    ALT (SGPT) 22 1 - 33 U/L    AST (SGOT) 26 1 - 32 U/L    Alkaline Phosphatase 83 39 - 117 U/L    Total Bilirubin 0.3 0.0 - 1.2 mg/dL    eGFR Non African Amer 78 >60 mL/min/1.73    Globulin 2.6 gm/dL    A/G Ratio 1.6 g/dL    BUN/Creatinine Ratio 27.1 (H) 7.0 - 25.0    Anion Gap 9.1 5.0 - 15.0 mmol/L   BNP     Collection Time: 06/21/21  4:57 PM    Specimen: Arm, Left; Blood   Result Value Ref Range    proBNP 860.3 0.0-1,800.0 pg/mL   Troponin    Collection Time: 06/21/21  4:57 PM    Specimen: Arm, Left; Blood   Result Value Ref Range    Troponin T <0.010 0.000 - 0.030 ng/mL   Green Top (Gel)    Collection Time: 06/21/21  4:57 PM   Result Value Ref Range    Extra Tube Hold for add-ons.    Gold Top - SST    Collection Time: 06/21/21  4:57 PM   Result Value Ref Range    Extra Tube Hold for add-ons.    Lavender Top    Collection Time: 06/21/21  4:58 PM   Result Value Ref Range    Extra Tube hold for add-on    CBC Auto Differential    Collection Time: 06/21/21  4:58 PM    Specimen: Arm, Left; Blood   Result Value Ref Range    WBC 8.11 3.40 - 10.80 10*3/mm3    RBC 4.58 3.77 - 5.28 10*6/mm3    Hemoglobin 13.4 12.0 - 15.9 g/dL    Hematocrit 41.1 34.0 - 46.6 %    MCV 89.7 79.0 - 97.0 fL    MCH 29.3 26.6 - 33.0 pg    MCHC 32.6 31.5 - 35.7 g/dL    RDW 13.6 12.3 - 15.4 %    RDW-SD 44.5 37.0 - 54.0 fl    MPV 9.1 6.0 - 12.0 fL    Platelets 394 140 - 450 10*3/mm3    Neutrophil % 62.2 42.7 - 76.0 %    Lymphocyte % 27.3 19.6 - 45.3 %    Monocyte % 9.0 5.0 - 12.0 %    Eosinophil % 0.7 0.3 - 6.2 %    Basophil % 0.6 0.0 - 1.5 %    Immature Grans % 0.2 0.0 - 0.5 %    Neutrophils, Absolute 5.04 1.70 - 7.00 10*3/mm3    Lymphocytes, Absolute 2.21 0.70 - 3.10 10*3/mm3    Monocytes, Absolute 0.73 0.10 - 0.90 10*3/mm3    Eosinophils, Absolute 0.06 0.00 - 0.40 10*3/mm3    Basophils, Absolute 0.05 0.00 - 0.20 10*3/mm3    Immature Grans, Absolute 0.02 0.00 - 0.05 10*3/mm3    nRBC 0.0 0.0 - 0.2 /100 WBC   POC Glucose Once    Collection Time: 06/21/21  6:09 PM    Specimen: Blood   Result Value Ref Range    Glucose 89 70 - 130 mg/dL   D-dimer, Quantitative    Collection Time: 06/21/21  7:07 PM    Specimen: Blood   Result Value Ref Range    D-Dimer, Quantitative 0.90 (H) 0.00 - 0.49 MCGFEU/mL       Ordered the above labs and independently reviewed the  results.        RADIOLOGY  XR Chest 2 View    Result Date: 6/21/2021  EMERGENCY PA AND LATERAL CHEST X-RAY 06/21/2021  CLINICAL HISTORY: Shortness of breath, back pain between shoulder blades.  COMPARISON: This is correlated to a prior chest x-ray 07/23/2020.  FINDINGS: The cardiomediastinal silhouette and pulmonary vasculature are within normal limits. Some posterior eventration left hemidiaphragm. Some minimal linear atelectasis at the lung bases, otherwise the lungs are clear. The costophrenic angles are sharp..      1. No definite active disease is seen in the chest and the etiology of the shortness breath is not established on this exam.      XR Spine Thoracic 3 View    Result Date: 6/21/2021  THORACIC SPINE X-RAYS  CLINICAL HISTORY: Back pain.  A total of 3 AP and lateral views were obtained.  Compared to a previous chest x-ray dated 07/23/2020.  There is moderate compression deformity of the T3 and T4 vertebral bodies producing approximately 50-60% loss of height. The fracture of T3 was present on the previous chest x-ray appears essentially unchanged. The T4 fracture is new since that study, and is therefore of indeterminate age. The remainder of the thoracic vertebral bodies are normal in height. Mild to moderate multilevel degenerative disc changes are noted. There is slight exaggeration of the normal thoracic kyphosis due to the degenerative disc changes and also the fractures. The alignment is otherwise unremarkable.  This report was finalized on 6/21/2021 7:50 PM by Dr. Enrique Muro M.D.        I ordered the above noted radiological studies. Reviewed by me and discussed with radiologist.  See dictation for official radiology interpretation.      PROCEDURES    Procedures      MEDICATIONS GIVEN IN ER    Medications   sodium chloride 0.9 % flush 10 mL (has no administration in time range)   ketorolac (TORADOL) injection 15 mg (15 mg Intravenous Given 6/21/21 1901)   albuterol (PROVENTIL) nebulizer  solution 0.083% 2.5 mg/3mL (2.5 mg Nebulization Given 6/21/21 1927)         PROGRESS, DATA ANALYSIS, CONSULTS, AND MEDICAL DECISION MAKING    I seen and examined this patient.  Also discussed my evaluation with the granddaughter that is at bedside.  I think she likely has musculoskeletal pain in her back.  We will do some dedicated thoracic spine x-rays.  I think this patient likely has some progression of her COPD.  Her symptoms are very consistent with chronic bronchitis with increased cough and congestion in the morning and then clears up.  Patient does not report any really significant change but her granddaughter feels as if she has had some increase in shortness of breath.  Currently right now she is very stable in no distress.  She is not had her inhaler this morning and I will give her 2 puffs of an albuterol inhaler.  All questions answered at this time.    All labs have been independently reviewed by me.  All radiology studies have been reviewed by me and discussed with radiologist dictating the report.   EKG's independently viewed and interpreted by me.  Discussion below represents my analysis of pertinent findings related to patient's condition, differential diagnosis, treatment plan and final disposition.      ED Course as of Jun 21 2147   Mon Jun 21, 2021 1913 EKG done at 1526Is a rate of 78 and is normal sinus rhythmThere is some intraventricular conduction delay and has the appearance of a left bundle branch blockI do not see any acute injury patternQT looks normalI compared to an EKG from January 7, 2017 and it looks very similar.    [MM]   1956 Patient is 89 years of age her age adjusted dimer is negative.  And I have a low clinical suspicion that she has a pulmonary embolism.   D-Dimer, Quant(!): 0.90 [MM]   1957 I looked at the thoracic spine x-rays and reviewed the radiologist report.  There is a T4 compression fracture that is of indeterminate age.  She has a previous T3 compression fracture  that is old.  She has other areas of mild compression deformities and arthritic changes.    [MM]   2032 I have seen and reevaluated this patient discussed with the patient and the granddaughter at length about the results of the test.  She appears very comfortable.  Informed her she has some compression fractures which are likely the etiology of the pain.  1 on T4 that appears new compared to previous studies.  She used to ride her [employer] bike that was stationary in the house up to approximately 10 days ago when she started getting the back pain.  That very very well could be the time that she developed a new compression deformity at T4.    [MM]   2100 Patient was able to get up and ambulate in the ER without any distress or any obvious pain.  We will go ahead and discharge her home.  I talked again at length with the patient as well as her granddaughter here in the emergency department.    [MM]      ED Course User Index  [MM] Emmanuel Hong MD       AS OF 21:47 EDT VITALS:    BP - (!) 189/94  HR - 72  TEMP - 98.4 °F (36.9 °C) (Tympanic)  02 SATS - 94%        DIAGNOSIS  Final diagnoses:   Nontraumatic compression fracture of T4 vertebra, initial encounter (CMS/[facility])   Nontraumatic compression fracture of T3 vertebra, sequela   Chronic obstructive pulmonary disease, unspecified COPD type (CMS/[facility])         DISPOSITION  DISCHARGE    Patient discharged in stable condition.    Reviewed implications of results, diagnosis, meds, responsibility to follow up, warning signs and symptoms of possible worsening, potential complications and reasons to return to ER, including worsening of symptoms, any urinary or fecal incontinence, weakness to extremities, worsening of pain, fever, chills, any concerns..    Patient/Family voiced understanding of above instructions.    Discussed plan for discharge, as there is no emergent indication for admission. Pt/family is agreeable and understands need for follow up and repeat testing.  Pt  is aware that discharge does not mean that nothing is wrong but it indicates no emergency is present that requires admission and they must continue care with follow-up as given below or physician of their choice.     FOLLOW-UP  Caesar Robles MD  [address] [id number] [phone]      Follow-up for compression fractures.         Medication List      No changes were made to your prescriptions during this visit.                  Emmanuel Hong MD  06/21/21 6798

## 2021-06-21 NOTE — ED NOTES
Callie Bipin, [phone], granddaughter to be contacted with information, and room placement.      Tamie Ashley, RN  06/21/21 4479

## 2021-06-22 NOTE — ED NOTES
Pt ambulated in the [place] with a walker without any incidents. Pt denies any pain or dizziness. Provider advised.     Lisa Apple, JENISE  Resident  06/21/21 2041

## 2021-06-28 ENCOUNTER — TELEPHONE (OUTPATIENT)
Dept: INTERNAL MEDICINE | Age: 86
End: 2021-06-28

## 2021-06-28 NOTE — TELEPHONE ENCOUNTER
Caller: Amy Ridley    Relationship to patient: Emergency Contact    Best call back number: [phone] (H)    Patient is needing: PATIENTS GRANDDAUGHTER CALLED IN TO CANCEL PATIENTS HOSPITAL FOLLOW UP. STATES THAT THEY CAN NOT COME IN THE MORNINGS. STATED THAT SHE COULD DO AN AFTERNOON APPOINTMENT BUT THERE IS NO AVAILABILITY IN THE OFFICE, SO SHE JUST WANTS TO CANCEL BUT WILL COME IN FOR APPOINTMENT ON  7/29/21. GRANDDAUGHTER ALSO STATED THAT SHE IS PATIENTS MEDICAL POA, AND WOULD LIKE TO BE ABLE TO COME INTO ALL THE VISITS WITH THE PATIENT. PLEASE ADVISE. THANK YOU

## 2021-07-29 ENCOUNTER — OFFICE VISIT (OUTPATIENT)
Dept: INTERNAL MEDICINE | Age: 86
End: 2021-07-29

## 2021-07-29 VITALS
SYSTOLIC BLOOD PRESSURE: 136 MMHG | TEMPERATURE: 97.5 F | HEIGHT: 64 IN | HEART RATE: 88 BPM | DIASTOLIC BLOOD PRESSURE: 74 MMHG | OXYGEN SATURATION: 94 % | BODY MASS INDEX: 16.22 KG/M2 | WEIGHT: 95 LBS

## 2021-07-29 DIAGNOSIS — S22.040D COMPRESSION FRACTURE OF T4 VERTEBRA WITH ROUTINE HEALING, SUBSEQUENT ENCOUNTER: Primary | ICD-10-CM

## 2021-07-29 DIAGNOSIS — R53.1 WEAKNESS: ICD-10-CM

## 2021-07-29 DIAGNOSIS — J44.9 CHRONIC OBSTRUCTIVE PULMONARY DISEASE, UNSPECIFIED COPD TYPE (HCC): ICD-10-CM

## 2021-07-29 DIAGNOSIS — R53.83 FATIGUE, UNSPECIFIED TYPE: ICD-10-CM

## 2021-07-29 PROCEDURE — 99215 OFFICE O/P EST HI 40 MIN: CPT | Performed by: NURSE PRACTITIONER

## 2021-07-29 NOTE — PROGRESS NOTES
"Chief Complaint  Hypertension, Hyperlipidemia, Shortness of Breath, and Fatigue    Subjective          Sharonda Ridley presents to [facility]     Patient present today with granddaughter with multiple medical concerns.     Shortness of Breath  This is a new problem. The current episode started more than 1 month ago. The problem has been unchanged. Associated symptoms include wheezing. Pertinent negatives include no chest pain or fever. Associated symptoms comments: Activity intolerance. She has tried beta agonist inhalers (ER evaluation on 6/21/21 for SOA and back pain ) for the symptoms. Her past medical history is significant for COPD.   Fatigue  This is a new problem. The current episode started 1 to 4 weeks ago. Associated symptoms include anorexia, coughing and fatigue. Pertinent negatives include no change in bowel habit, chest pain, fever, nausea, numbness, urinary symptoms or weakness. Associated symptoms comments: \"upset stomach\" . She has tried nothing for the symptoms.   Back Pain  This is a new problem. The current episode started more than 1 month ago. The pain is present in the thoracic spine. Radiates to: right shoulder and arm (scapular area)  The pain is moderate. The pain is worse during the night. Associated symptoms include weight loss. Pertinent negatives include no chest pain, fever, numbness, paresis, paresthesias, tingling or weakness. Risk factors include history of osteoporosis. She has tried NSAIDs (Seen in ER last month, diagnosed with old T3 fracture, new T4 fracture (has not had any recent trauma or injury), told to take \"2 Motrin and 1 Tylenol\" twice daily. ) for the symptoms. The treatment provided no relief.      Patient is very upset that she has not felt well enough to exercise (uses stationary bike for 2 miles/day) and she has not felt strong enough to do this. She does c/o increased \"wobbliness\" while walking now. She has also lost about 7-8 lbs " "since her last office visit.       Objective   Vital Signs:   /74   Pulse 88   Temp 97.5 °F (36.4 °C) (Temporal)   Ht 162.6 cm (64.02\")   Wt 43.1 kg (95 lb)   SpO2 94%   BMI 16.30 kg/m²     Physical Exam  Vitals and nursing note reviewed.   Constitutional:       General: She is not in acute distress.     Appearance: She is well-developed. She is not ill-appearing.   Cardiovascular:      Rate and Rhythm: Normal rate and regular rhythm.      Heart sounds: Normal heart sounds, S1 normal and S2 normal. No murmur heard.     Pulmonary:      Effort: Pulmonary effort is normal.      Breath sounds: Examination of the right-lower field reveals wheezing. Examination of the left-lower field reveals wheezing. Wheezing present. No decreased breath sounds, rhonchi or rales.   Skin:     General: Skin is warm and dry.   Neurological:      Mental Status: She is alert and oriented to person, place, and time.   Psychiatric:         Speech: Speech normal.         Behavior: Behavior normal. Behavior is cooperative.         Thought Content: Thought content normal.         Judgment: Judgment normal.        Result Review :   The following data was reviewed by: CARITO Bateman on 07/29/2021:  Common labs    Common Labsle 12/21/20 12/21/20 2/4/21 6/21/21 6/21/21    1456 1456  1657 1658   Glucose    69    Glucose 99  96     BUN 19  18 19    Creatinine 0.79  0.75 0.70    eGFR Non  Am 68  73 78    eGFR African Am 83  88     Sodium 123 (A)  135 (A) 134 (A)    Potassium 4.9  4.6 4.2    Chloride 90 (A)  97 (A) 96 (A)    Calcium 9.2  9.4 8.9    Total Protein 7.1       Albumin 4.20   4.10    Total Bilirubin 0.5   0.3    Alkaline Phosphatase 79   83    AST (SGOT) 30   26    ALT (SGPT) 25   22    WBC     8.11   Hemoglobin     13.4   Hematocrit     41.1   Platelets     394   Hemoglobin A1C  5.60      (A) Abnormal value       Comments are available for some flowsheets but are not being displayed.           Data reviewed: " Radiologic studies      THORACIC SPINE X-RAYS     CLINICAL HISTORY: Back pain.     A total of 3 AP and lateral views were obtained.     Compared to a previous chest x-ray dated 07/23/2020.     There is moderate compression deformity of the T3 and T4 vertebral  bodies producing approximately 50-60% loss of height. The fracture of T3  was present on the previous chest x-ray appears essentially unchanged.  The T4 fracture is new since that study, and is therefore of  indeterminate age. The remainder of the thoracic vertebral bodies are  normal in height. Mild to moderate multilevel degenerative disc changes  are noted. There is slight exaggeration of the normal thoracic kyphosis  due to the degenerative disc changes and also the fractures. The  alignment is otherwise unremarkable.     This report was finalized on 6/21/2021 7:50 PM by Dr. Enrique Muro M.D.    EMERGENCY PA AND LATERAL CHEST X-RAY 06/21/2021     CLINICAL HISTORY: Shortness of breath, back pain between shoulder  blades.      COMPARISON: This is correlated to a prior chest x-ray 07/23/2020.     FINDINGS: The cardiomediastinal silhouette and pulmonary vasculature are  within normal limits. Some posterior eventration left hemidiaphragm.  Some minimal linear atelectasis at the lung bases, otherwise the lungs  are clear. The costophrenic angles are sharp. There are compression  deformities in the upper thoracic spine, age indeterminate.     IMPRESSION:  1. No definite active disease is seen in the chest and the etiology of  the shortness breath is not established on this exam.  2. Compression deformities in the upper thoracic spine at approximately  T3 and T4, age-indeterminate. This finding should be correlated  clinically.     This report was finalized on 6/22/2021 7:19 AM by Dr. Maldonado Gibbons M.D.         Assessment and Plan    Diagnoses and all orders for this visit:    1. Compression fracture of T4 vertebra with routine healing, subsequent encounter  (Primary)  Still having right arm/shoulder pain which I suspect is radicular from thoracic spine fracture. Discussed possible treatment options including kyphoplasty, referral to specialist, and physical therapy.  At this time, patient is agreeable to referral to specialist to discuss possible treatment options but would like to avoid any specific procedures if possible.  Advised patient stop use of ibuprofen as I suspect this is likely causing some of her GI discomfort. Can take Tylenol no more than 3000mg/24 period.     -     Ambulatory Referral to Neurosurgery    2. Chronic obstructive pulmonary disease, unspecified COPD type (CMS/[facility])  Uncontrolled. Add LAMA to current rescue inhaler.   Will follow up one month for re-evaluation, sooner as needed.   Likely her debility/deconditioning/ weakness and weight loss may be related to increased work of breathing.     -     tiotropium bromide monohydrate (SPIRIVA RESPIMAT) 2.5 MCG/ACT aerosol solution inhaler; Inhale 2 puffs Daily.  Dispense: 4 g; Refill: 5    3. Weakness  Labs today.   Recommended formal physical therapy for both treatment of her thoracic compression fractures and back pain as well as generalized debility.  At this time, she does not want to proceed with this referral.    -     CBC & Differential  -     Comprehensive Metabolic Panel  -     TSH Rfx On Abnormal To Free T4  -     Urinalysis With Culture If Indicated - Urine, Clean Catch    4. Fatigue, unspecified type      I spent 50 minutes caring for Sharonda on this date of service. This time includes time spent by me in the following activities:preparing for the visit, reviewing tests, obtaining and/or reviewing a separately obtained history, performing a medically appropriate examination and/or evaluation , counseling and educating the patient/family/caregiver, ordering medications, tests, or procedures and documenting information in the medical record  Follow Up   Return in about 1 month (around  8/29/2021) for Recheck.  Patient was given instructions and counseling regarding her condition or for health maintenance advice. Please see specific information pulled into the AVS if appropriate.

## 2021-07-30 LAB
ALBUMIN SERPL-MCNC: 4.3 G/DL (ref 3.5–5.2)
ALBUMIN/GLOB SERPL: 1.7 G/DL
ALP SERPL-CCNC: 93 U/L (ref 39–117)
ALT SERPL-CCNC: 21 U/L (ref 1–33)
APPEARANCE UR: CLEAR
AST SERPL-CCNC: 29 U/L (ref 1–32)
BACTERIA #/AREA URNS HPF: NORMAL /HPF
BASOPHILS # BLD AUTO: 0.04 10*3/MM3 (ref 0–0.2)
BASOPHILS NFR BLD AUTO: 0.6 % (ref 0–1.5)
BILIRUB SERPL-MCNC: 0.3 MG/DL (ref 0–1.2)
BILIRUB UR QL STRIP: NEGATIVE
BUN SERPL-MCNC: 20 MG/DL (ref 8–23)
BUN/CREAT SERPL: 21.3 (ref 7–25)
CALCIUM SERPL-MCNC: 9.3 MG/DL (ref 8.2–9.6)
CASTS URNS QL MICRO: NORMAL /LPF
CHLORIDE SERPL-SCNC: 91 MMOL/L (ref 98–107)
CO2 SERPL-SCNC: 28.9 MMOL/L (ref 22–29)
COLOR UR: YELLOW
CREAT SERPL-MCNC: 0.94 MG/DL (ref 0.57–1)
EOSINOPHIL # BLD AUTO: 0.01 10*3/MM3 (ref 0–0.4)
EOSINOPHIL NFR BLD AUTO: 0.1 % (ref 0.3–6.2)
EPI CELLS #/AREA URNS HPF: NORMAL /HPF (ref 0–10)
ERYTHROCYTE [DISTWIDTH] IN BLOOD BY AUTOMATED COUNT: 14.1 % (ref 12.3–15.4)
GLOBULIN SER CALC-MCNC: 2.5 GM/DL
GLUCOSE SERPL-MCNC: 87 MG/DL (ref 65–99)
GLUCOSE UR QL: NEGATIVE
HCT VFR BLD AUTO: 38.9 % (ref 34–46.6)
HGB BLD-MCNC: 13.1 G/DL (ref 12–15.9)
HGB UR QL STRIP: NEGATIVE
IMM GRANULOCYTES # BLD AUTO: 0.03 10*3/MM3 (ref 0–0.05)
IMM GRANULOCYTES NFR BLD AUTO: 0.4 % (ref 0–0.5)
KETONES UR QL STRIP: NEGATIVE
LEUKOCYTE ESTERASE UR QL STRIP: NEGATIVE
LYMPHOCYTES # BLD AUTO: 1.06 10*3/MM3 (ref 0.7–3.1)
LYMPHOCYTES NFR BLD AUTO: 14.9 % (ref 19.6–45.3)
MCH RBC QN AUTO: 30.1 PG (ref 26.6–33)
MCHC RBC AUTO-ENTMCNC: 33.7 G/DL (ref 31.5–35.7)
MCV RBC AUTO: 89.4 FL (ref 79–97)
MICRO URNS: NORMAL
MICRO URNS: NORMAL
MONOCYTES # BLD AUTO: 0.49 10*3/MM3 (ref 0.1–0.9)
MONOCYTES NFR BLD AUTO: 6.9 % (ref 5–12)
NEUTROPHILS # BLD AUTO: 5.47 10*3/MM3 (ref 1.7–7)
NEUTROPHILS NFR BLD AUTO: 77.1 % (ref 42.7–76)
NITRITE UR QL STRIP: NEGATIVE
NRBC BLD AUTO-RTO: 0 /100 WBC (ref 0–0.2)
PH UR STRIP: 7 [PH] (ref 5–7.5)
PLATELET # BLD AUTO: 377 10*3/MM3 (ref 140–450)
POTASSIUM SERPL-SCNC: 4.9 MMOL/L (ref 3.5–5.2)
PROT SERPL-MCNC: 6.8 G/DL (ref 6–8.5)
PROT UR QL STRIP: NORMAL
RBC # BLD AUTO: 4.35 10*6/MM3 (ref 3.77–5.28)
RBC #/AREA URNS HPF: NORMAL /HPF (ref 0–2)
SODIUM SERPL-SCNC: 131 MMOL/L (ref 136–145)
SP GR UR: 1.02 (ref 1–1.03)
TSH SERPL DL<=0.005 MIU/L-ACNC: 1.55 UIU/ML (ref 0.27–4.2)
URINALYSIS REFLEX: NORMAL
UROBILINOGEN UR STRIP-MCNC: 0.2 MG/DL (ref 0.2–1)
WBC # BLD AUTO: 7.1 10*3/MM3 (ref 3.4–10.8)
WBC #/AREA URNS HPF: NORMAL /HPF (ref 0–5)

## 2021-08-02 DIAGNOSIS — I10 HTN (HYPERTENSION), BENIGN: ICD-10-CM

## 2021-08-03 RX ORDER — AMLODIPINE BESYLATE 5 MG/1
TABLET ORAL
Qty: 90 TABLET | Refills: 1 | Status: SHIPPED | OUTPATIENT
Start: 2021-08-03 | End: 2021-01-01 | Stop reason: SDUPTHER

## 2021-08-04 DIAGNOSIS — S22.040A COMPRESSION FRACTURE OF T4 VERTEBRA, INITIAL ENCOUNTER (HCC): Primary | ICD-10-CM

## 2021-08-05 ENCOUNTER — TELEPHONE (OUTPATIENT)
Dept: NEUROSURGERY | Facility: CLINIC | Age: 86
End: 2021-08-05

## 2021-08-05 NOTE — TELEPHONE ENCOUNTER
MARILUZ for patient and pt's granddaughter to call back. Pt was referred to us for compression fx from ER however she has only had an xray. Pt needs an MRI prior to scheduling to make sure she needs him vs neurosurgeon. Order has been placed and sent to the hospital who will call pt to schedule.

## 2021-08-23 NOTE — TELEPHONE ENCOUNTER
Per granddaughter:   Pt is doing well and has no desire to get a kyphoplasty.  She will call us if she needs us.

## 2021-08-23 NOTE — TELEPHONE ENCOUNTER
LMR for Callie (granddaughter see HIPAA) to inquire about whether Sharonda no longer needs to see us or can we schedule her for a f/u appt.

## 2021-08-26 NOTE — PATIENT INSTRUCTIONS
DR. Malcom Glover [phone]  (call to schedule appointment for follow up, let me know if needs MRI)

## 2021-08-26 NOTE — PROGRESS NOTES
"Chief Complaint  Follow-up (vertebral fracture, right arm pain ) and COPD    Subjective          Sharonda Ridley presents to [facility]  History of Present Illness    Patient follow up from last visit for c/o fatigue, back pain, shortness of breath.     She reports her back pain is improved, but still has pain near right shoulder blade that \"catches\" and also \"aching\" in right upper arm.  She had been referred to specialist through the ER prior to last visit.  Upon review of telephone notes, it looks like they attempted to call her multiple times and eventually her niece told them that the patient was doing much better and that she did not need to be seen after all.    She was also started on Spiriva at last office visit and reports shortness of breath much better.  She has also managed to gain back approximately 5 pounds and appetite has improved.     She is still discouraged that she has not felt strong enough yet to get back on her [employer] bicycle due to the pain in her back and her right arm aching.     Objective   Vital Signs:   /76   Pulse 77   Temp 97.4 °F (36.3 °C) (Temporal)   Ht 162.6 cm (64.02\")   Wt 45 kg (99 lb 3.2 oz)   SpO2 95%   BMI 17.02 kg/m²     Physical Exam  Vitals and nursing note reviewed.   Constitutional:       General: She is not in acute distress.     Appearance: She is well-developed. She is not ill-appearing.   Cardiovascular:      Rate and Rhythm: Normal rate and regular rhythm.      Heart sounds: Normal heart sounds, S1 normal and S2 normal. No murmur heard.     Pulmonary:      Effort: Pulmonary effort is normal.      Breath sounds: Normal breath sounds. No decreased breath sounds, wheezing, rhonchi or rales.   Skin:     General: Skin is warm and dry.   Neurological:      Mental Status: She is alert and oriented to person, place, and time.   Psychiatric:         Speech: Speech normal.         Behavior: Behavior normal. Behavior is " cooperative.         Thought Content: Thought content normal.         Judgment: Judgment normal.        Result Review :   The following data was reviewed by: CARITO Bateman on 08/26/2021:  Common labs    Common Labsle 2/4/21 6/21/21 6/21/21 7/29/21 7/29/21     1657 1658 1520 1520   Glucose  69      Glucose 96    87   BUN 18 19   20   Creatinine 0.75 0.70   0.94   eGFR Non  Am 73 78   56 (A)   eGFR  Am 88    68   Sodium 135 (A) 134 (A)   131 (A)   Potassium 4.6 4.2   4.9   Chloride 97 (A) 96 (A)   91 (A)   Calcium 9.4 8.9   9.3   Total Protein     6.8   Albumin  4.10   4.30   Total Bilirubin  0.3   0.3   Alkaline Phosphatase  83   93   AST (SGOT)  26   29   ALT (SGPT)  22   21   WBC   8.11 7.10    Hemoglobin   13.4 13.1    Hematocrit   41.1 38.9    Platelets   394 377    (A) Abnormal value       Comments are available for some flowsheets but are not being displayed.           Data reviewed: Radiologic studies      THORACIC SPINE X-RAYS     CLINICAL HISTORY: Back pain.     A total of 3 AP and lateral views were obtained.     Compared to a previous chest x-ray dated 07/23/2020.     There is moderate compression deformity of the T3 and T4 vertebral  bodies producing approximately 50-60% loss of height. The fracture of T3  was present on the previous chest x-ray appears essentially unchanged.  The T4 fracture is new since that study, and is therefore of  indeterminate age. The remainder of the thoracic vertebral bodies are  normal in height. Mild to moderate multilevel degenerative disc changes  are noted. There is slight exaggeration of the normal thoracic kyphosis  due to the degenerative disc changes and also the fractures. The  alignment is otherwise unremarkable.     This report was finalized on 6/21/2021 7:50 PM by Dr. Enrique Muro M.D.         Assessment and Plan    Diagnoses and all orders for this visit:    1. Chronic obstructive pulmonary disease, unspecified COPD type  (CMS/[facility])  Continue current therapy. Follow up 4 months for re-evaluation, sooner as needed.     2. Compression fracture of T4 vertebra with routine healing, subsequent encounter  Patient still refusing Physical therapy. Recommended contact neurosurgery/spine again for follow up appointment, may need to have MRI ordered through my office.     3. HTN (hypertension), benign  Blood pressure stable on current therapy, continue same.  Check labs today and adjust dosage of medication as necessary.  Follow-up 4 months.    - amLODIPine (NORVASC) 5 MG tablet; Take 1 tablet by mouth Daily.  Dispense: 90 tablet; Refill: 1    4. Dyspnea on exertion    - montelukast (SINGULAIR) 10 MG tablet; Take 1 tablet by mouth Every Evening.  Dispense: 90 tablet; Refill: 3    5. Hyponatremia    - Basic metabolic panel      Follow Up   Return in about 4 months (around 12/26/2021) for Next scheduled follow up.  Patient was given instructions and counseling regarding her condition or for health maintenance advice. Please see specific information pulled into the AVS if appropriate.

## 2021-08-30 NOTE — TELEPHONE ENCOUNTER
Caller: Sharonda Ridley    Relationship to patient: Self    Best call back number:[phone]    Chief complaint:NEEDS TO BE SEEN    Type of visit:NEW PATIENT-IMAGING    Requested date:ASAP    If rescheduling, when is the original appointment:NA    Additional notes:PT CALLED AND STATES THAT SHE HAD FOLLOWED UP WITH HER PCP AND SHE SUGGESTED THAT EVEN THOUGH THE PT WAS FEELING BETTER ON THE LEFT SIDE PT WAS STILL COMPLAINING OF A CATCH ON HER RIGHT SIDE-PT STATES THAT SHE HAS BEEN TAKING TYLENOYL VERY 6 HOURS. PT STATES THAT SHE WOULD LIKE TO MAKE AN APPT. TO HAVE IMAGING AND TO BE SEEN IF POSSIBLE-PT STATES SHE WOULD NEED AN OPEN MRI AS WELL.

## 2021-08-31 NOTE — TELEPHONE ENCOUNTER
Spoke with Sharonda--she asked me to call her granddaughter Amy and give her the info.  The MRI order has been faxed to [facility] and I told her to call us back when that is scheduled to see Dr Glover.

## 2021-09-27 NOTE — TELEPHONE ENCOUNTER
Caller: Sharonda Ridley    Relationship: Self    Best call back number: [phone]    Caller requesting test results: PATIENT     What test was performed: MRI ON BACK/SPINE    When was the test performed: SEPT 16, 2021    Where was the test performed: [facility]     PLEASE ADVISE

## 2021-09-29 NOTE — TELEPHONE ENCOUNTER
Referrals called stating that Dr. Glover cannot see patient after review of MRI because her case is too complex. He said patients PCP needs to put a referral in to Dr. Henry.     PLEASE ADVISE

## 2021-09-30 NOTE — TELEPHONE ENCOUNTER
Left message for patient to call me back, to inform that a referral has been placed for Dr. Henry, per Neurosurgery office. [facility] will call patient to schedule.

## 2021-10-05 NOTE — TELEPHONE ENCOUNTER
INTERNAL REF. FROM [facility]- DX.- Compression fracture of T4 vertebra with routine healing-PRG. NOTES- 9/27/21-TE, 8/26/21,7/29/21-[facility]O IGLESIAS, 8/4/21-DR. CASTAÑEDA, NEUROSURGERY, 6/21/21-[facility]- IMAGING- 9/16/21-MRI THORACIC SPINE, 6/21/21-XRAY THORACIC SPINE- CAN [facility] SEE SOON ?

## 2021-10-05 NOTE — TELEPHONE ENCOUNTER
No, Dr. Hodges operated recently and he should see her if there is any urgency.  I can see her next available date for a new patient.

## 2021-10-06 NOTE — TELEPHONE ENCOUNTER
Ashish Silveira from Dr. Dias office called and said that she received a referral from MsAracelis Keyana's PCP to see Dr. Henry and because Dr. Glover has already consulted on Ms. Garcia, Dr. Henry believes that  Ms. Garcia needs to see Dr. Glover. Do you mind making her an appointment please?

## 2021-10-06 NOTE — TELEPHONE ENCOUNTER
Please advise, schedule with you?   Cyclophosphamide Counseling:  I discussed with the patient the risks of cyclophosphamide including but not limited to hair loss, hormonal abnormalities, decreased fertility, abdominal pain, diarrhea, nausea and vomiting, bone marrow suppression and infection. The patient understands that monitoring is required while taking this medication.

## 2021-10-06 NOTE — TELEPHONE ENCOUNTER
There was some misunderstanding regarding this patient and will contact Jordana at Dr. Henry's office to clarify. Dr. Glover has never actually seen this patient. Upon reviewing her MRI report he said she needs a spine doctor as he only treats compression fractures and she has more going on in the report than just compression fractures.

## 2021-10-08 NOTE — TELEPHONE ENCOUNTER
SPOKE WITH PATIENT INFORMED HER SHE SHOULD BE GETTING A CALL AS A SOON AS AN APPT IS AVAILABLE. PT DID STATE THAT TUES AND THURS AROUND 4 ARE THE ONLY TIMES SHE WILL HAVE A RIDE AVAILABLE.

## 2021-10-11 NOTE — TELEPHONE ENCOUNTER
I spoke with pt today. She explained to me that she needed a 4:00 appt or at the earliest she could have a 3:45 pm appt. After checking with Dr. Jj, I scheduled her for 11/18/21.     Also I spoke with pt's granddaughter Callie as per pt's request. I sent Callie a link to sign [facility]

## 2021-10-28 NOTE — PROGRESS NOTES
"Chief Complaint  Back Pain (f/u thoracic spine MRI )    Subjective          Sharonda Ridley presents to [facility]  History of Present Illness    Patient here today to discuss thoracic spine MRI ordered by neurosurgery, she has an appt on 11/18 with Dr. Jj.   Reports back pain resolved other than some pulling into her right shoulder and right arm, still having weakness of right arm.     Also wants evaluation of protruding sternum x \"10 days\". Apparently granddaughter noticed some prominence of her sternum. Patient has not had any trauma, no injury.     Objective   Vital Signs:   /80   Pulse 72   Temp 96.9 °F (36.1 °C) (Temporal)   Ht 162.6 cm (64.02\")   Wt 45.4 kg (100 lb)   SpO2 99%   BMI 17.16 kg/m²     Physical Exam  Chest:          Comments: Rather prominent manubrium without asymmetry. Non-tender.        Result Review :   The following data was reviewed by: CARITO Bateman on 10/28/2021:  Common labs    Common Labsle 6/21/21 6/21/21 7/29/21 7/29/21 8/26/21    1657 1658 1520 1520    Glucose 69       Glucose    87 74   BUN 19   20 20   Creatinine 0.70   0.94 0.78   eGFR Non  Am 78   56 (A) 69   eGFR  Am    68 84   Sodium 134 (A)   131 (A) 131 (A)   Potassium 4.2   4.9 4.8   Chloride 96 (A)   91 (A) 94 (A)   Calcium 8.9   9.3 9.0   Total Protein    6.8    Albumin 4.10   4.30    Total Bilirubin 0.3   0.3    Alkaline Phosphatase 83   93    AST (SGOT) 26   29    ALT (SGPT) 22   21    WBC  8.11 7.10     Hemoglobin  13.4 13.1     Hematocrit  41.1 38.9     Platelets  394 377     (A) Abnormal value       Comments are available for some flowsheets but are not being displayed.           Data reviewed: Radiologic studies- MRI done at [facility] (see chart)           Assessment and Plan    Diagnoses and all orders for this visit:    1. Compression fracture of T4 vertebra with routine healing, subsequent encounter (Primary)  Follow-up with neurosurgery " in next month as ordered.  Advised patient would recommend bone density scan as she has not had a formal bone density scan previously.  Suspect she has osteoporosis, advised her for now to continue her calcium and vitamin D.    -     DEXA Bone Density Axial; Future    2. Abnormal findings on diagnostic imaging of other parts of musculoskeletal system   -     DEXA Bone Density Axial; Future    3. Deformity of sternum  -     XR sternum pa and lateral      Follow Up   No follow-ups on file.  Patient was given instructions and counseling regarding her condition or for health maintenance advice. Please see specific information pulled into the AVS if appropriate.

## 2021-11-01 NOTE — TELEPHONE ENCOUNTER
----- Message from Katja Noe MA sent at 11/1/2021 10:57 AM EDT -----  Pt informed of lab results Via phone. Pt granddaughter would like the referral

## 2021-11-11 NOTE — TELEPHONE ENCOUNTER
Granddaughter has been left vm with details to call back for patient appointment in regards to referral we have received for patient sternum fracture.

## 2021-11-18 PROBLEM — S22.040A CLOSED WEDGE COMPRESSION FRACTURE OF T4 VERTEBRA (HCC): Status: ACTIVE | Noted: 2021-01-01

## 2021-11-18 NOTE — PROGRESS NOTES
"Subjective   Patient ID: Sharonda Ridley is a 89 y.o. female is being seen for consultation today at the request of No ref. provider found for compressure fracture. Patient had a MRI T-spine on 09/16/2021 at [facility].     Treatment: no recent treatment    Today patient is having upper back pain. Patient states that she has R shoulder pain intermittently.     Patient, Provider, and MA are all wearing a mask in our office today.     History of Present Illness    This patient has been having pain in her upper back since June.  At that time she apparently suffered a compression fracture of L4.  Subsequent to that the pain has improved to the point where she is able to control it now with just Tylenol several times a day.  She has no other particular complaints at this point.  She has no radiating pain and no difficulty with bowel bladder control or other myelopathic signs.    The following portions of the patient's history were reviewed and updated as appropriate: allergies, current medications, past family history, past medical history, past social history, past surgical history and problem list.    Review of Systems   Constitutional: Negative for chills and fever.   HENT: Negative for congestion.    Musculoskeletal: Positive for back pain, gait problem and myalgias. Negative for neck pain and neck stiffness.        R shoulder pain   Neurological: Positive for weakness and numbness.       I have reviewed the review of systems as documented by my MA.      Objective     Vitals:    11/18/21 1526   BP: 118/78   Cuff Size: Adult   Pulse: 89   Temp: 98 °F (36.7 °C)   Weight: 45.4 kg (100 lb)   Height: 162.6 cm (64.02\")     Body mass index is 17.15 kg/m².      Physical Exam  Eyes:      Extraocular Movements: EOM normal.      Pupils: Pupils are equal, round, and reactive to light.   Neurological:      Mental Status: She is alert and oriented to person, place, and time.      Coordination: Finger-Nose-Finger Test and " Heel to Shin Test normal.      Gait: Gait is intact.      Deep Tendon Reflexes:      Reflex Scores:       Tricep reflexes are 2+ on the right side and 2+ on the left side.       Bicep reflexes are 2+ on the right side and 2+ on the left side.       Brachioradialis reflexes are 2+ on the right side and 2+ on the left side.       Patellar reflexes are 2+ on the right side and 2+ on the left side.       Achilles reflexes are 2+ on the right side and 2+ on the left side.  Psychiatric:         Speech: Speech normal.       Neurologic Exam     Mental Status   Oriented to person, place, and time.   Registration of memory: Good recent and remote memory.   Attention: normal. Concentration: normal.   Speech: speech is normal   Level of consciousness: alert  Knowledge: consistent with education.     Cranial Nerves     CN II   Visual fields full to confrontation.   Visual acuity: normal    CN III, IV, VI   Pupils are equal, round, and reactive to light.  Extraocular motions are normal.     CN V   Facial sensation intact.   Right corneal reflex: normal  Left corneal reflex: normal    CN VII   Facial expression full, symmetric.   Right facial weakness: none  Left facial weakness: none    CN VIII   Hearing: intact    CN IX, X   Palate: symmetric    CN XI   Right sternocleidomastoid strength: normal  Left sternocleidomastoid strength: normal    CN XII   Tongue: not atrophic  Tongue deviation: none    Motor Exam   Muscle bulk: normal  Right arm tone: normal  Left arm tone: normal  Right leg tone: normal  Left leg tone: normal    Strength   Strength 5/5 except as noted.     Sensory Exam   Light touch normal.     Gait, Coordination, and Reflexes     Gait  Gait: normal    Coordination   Finger to nose coordination: normal  Heel to shin coordination: normal    Reflexes   Right brachioradialis: 2+  Left brachioradialis: 2+  Right biceps: 2+  Left biceps: 2+  Right triceps: 2+  Left triceps: 2+  Right patellar: 2+  Left patellar: 2+  Right  achilles: 2+  Left achilles: 2+  Right : 2+  Left : 2+          Assessment/Plan   Independent Review of Radiographic Studies:      I personally reviewed the images from the following studies.    I reviewed an MRI from [facility] from September.  This shows a T4 compression fracture and some disc bulging.  There is no evidence of any cord compression.    Medical Decision Making:      I told the patient and her family about the imaging.  I told him that from my point of view I would not recommend any type of surgery.  I believe this will continue to improve.  Told her to check in with her family doctor about the amount of Tylenol she is taking.    Diagnoses and all orders for this visit:    1. Closed wedge compression fracture of T4 vertebra, initial encounter (HCC) (Primary)      Return if symptoms worsen or fail to improve.

## 2021-12-30 NOTE — PROGRESS NOTES
"Chief Complaint  Follow-up (sternal fracture ), COPD, and Hypertension    Subjective          Sharonda Ridley presents to [facility]     Patient here for follow up sternal fracture, T4 compression fracture, chronic medical follow up.     She was seen by me in office 2 months ago. At that time, her niece Amy (present with patient) pointed out deformity of her sternum. XR revealed manubrium fracture with 1.4cm displacement.  Patient was scheduled to follow-up with thoracic surgery, however these appointments have been canceled twice.  She does have an upcoming appointment scheduled in February.  According to patient and family member, their office wants a repeat sternal x-ray approximately 1 week prior to her office visit.    Patient vehemently denies any falls or injury precipitating this deformity.  She did have a recent compression T4 fracture which was also nontraumatic.   She denies any pain, chest pain, increased shortness of breath.     COPD  There is no chest tightness, cough, shortness of breath or wheezing. This is a chronic problem. The problem has been unchanged. Pertinent negatives include no chest pain or dyspnea on exertion. Her past medical history is significant for COPD.   Hypertension  This is a chronic problem. Pertinent negatives include no chest pain or shortness of breath. Current antihypertension treatment includes calcium channel blockers.       Objective   Vital Signs:   /62   Pulse 77   Temp 97 °F (36.1 °C) (Infrared)   Resp 12   Ht 162.6 cm (64.02\")   Wt 44.5 kg (98 lb 3.2 oz)   SpO2 94%   BMI 16.85 kg/m²     Physical Exam  Vitals and nursing note reviewed.   Constitutional:       General: She is not in acute distress.     Appearance: She is well-developed. She is not ill-appearing.   Cardiovascular:      Rate and Rhythm: Normal rate and regular rhythm.      Heart sounds: Normal heart sounds, S1 normal and S2 normal. No murmur " heard.      Pulmonary:      Effort: Pulmonary effort is normal.      Breath sounds: Normal breath sounds. No decreased breath sounds, wheezing, rhonchi or rales.   Chest:      Chest wall: Deformity present.          Comments: Patient with prominent protrusion/deformity of sternum. Non-tender to palpation.   Skin:     General: Skin is warm and dry.   Neurological:      Mental Status: She is alert and oriented to person, place, and time.   Psychiatric:         Speech: Speech normal.         Behavior: Behavior normal. Behavior is cooperative.         Thought Content: Thought content normal.         Judgment: Judgment normal.        Result Review :   The following data was reviewed by: CARITO Bateman on 12/30/2021:  Common labs    Common Labsle 6/21/21 6/21/21 7/29/21 7/29/21 8/26/21    1657 1658 1520 1520    Glucose 69   87 74   BUN 19   20 20   Creatinine 0.70   0.94 0.78   eGFR Non  Am 78   56 (A) 69   eGFR  Am    68 84   Sodium 134 (A)   131 (A) 131 (A)   Potassium 4.2   4.9 4.8   Chloride 96 (A)   91 (A) 94 (A)   Calcium 8.9   9.3 9.0   Total Protein    6.8    Albumin 4.10   4.30    Total Bilirubin 0.3   0.3    Alkaline Phosphatase 83   93    AST (SGOT) 26   29    ALT (SGPT) 22   21    WBC  8.11 7.10     Hemoglobin  13.4 13.1     Hematocrit  41.1 38.9     Platelets  394 377     (A) Abnormal value       Comments are available for some flowsheets but are not being displayed.           Data reviewed: Radiologic studies      STERNUM: 2 VIEWS     HISTORY: Sternal deformity.     COMPARISON: Two-view chest 06/21/2021.     FINDINGS: Since the previous 2 view chest just over 4 months ago there  has developed a displaced fracture of the proximal sternal body  approximately 1.6 cm below the sternal-manubrial junction. Fracture is  associated with 1.4 cm anterior displacement of the distal fragment.     IMPRESSION:  Displaced fracture of the proximal sternal body.     This report was finalized on  10/29/2021 9:05 AM by Dr. Indra Johnson M.D.         Assessment and Plan    Diagnoses and all orders for this visit:    1. Closed fracture of manubrium, initial encounter (Primary)  Recommended CXR today to evaluate for pulmonary abnormality. However, patient reports that today is not convenient and will have CXR done end of January with repeat sternal XR prior to her specialist appt.   Cardiac and pulmonary assessment today WNL.   Patient denies having had any trauma or direct blow to the chest preceding the appearance of the deformity, she is not currently having any pain.  Advised her to keep her specialist appointment and ER indications were given, including severe chest pain, shortness of breath, etc.     -     XR Chest PA & Lateral; Future  -     XR sternum pa and lateral; Future    2. Compression fracture of T4 vertebra with routine healing, subsequent encounter    3. Primary hypertension  Blood pressure stable on current therapy, continue same.  Check labs today and adjust dosage of medication as necessary.  Follow-up 4 months.      4. Chronic obstructive pulmonary disease, unspecified COPD type (HCC)  Stable on current therapy, continue same.       Follow Up   Return in about 4 months (around 4/30/2022) for Next scheduled follow up (Daniel) .  Patient was given instructions and counseling regarding her condition or for health maintenance advice. Please see specific information pulled into the AVS if appropriate.

## 2022-01-01 ENCOUNTER — HOSPITAL ENCOUNTER (EMERGENCY)
Facility: HOSPITAL | Age: 87
Discharge: HOME | End: 2022-06-16
Attending: EMERGENCY MEDICINE | Admitting: EMERGENCY MEDICINE

## 2022-01-01 ENCOUNTER — APPOINTMENT (OUTPATIENT)
Dept: GENERAL RADIOLOGY | Facility: HOSPITAL | Age: 87
End: 2022-01-01

## 2022-01-01 ENCOUNTER — LAB (OUTPATIENT)
Dept: LAB | Facility: HOSPITAL | Age: 87
End: 2022-01-01

## 2022-01-01 ENCOUNTER — TELEPHONE (OUTPATIENT)
Dept: INTERNAL MEDICINE | Age: 87
End: 2022-01-01

## 2022-01-01 ENCOUNTER — HOSPITAL ENCOUNTER (INPATIENT)
Facility: HOSPITAL | Age: 87
LOS: 2 days | Discharge: NURSING, REHAB OR LONG-TERM CARE | End: 2022-07-06
Attending: EMERGENCY MEDICINE | Admitting: INTERNAL MEDICINE

## 2022-01-01 ENCOUNTER — OFFICE VISIT (OUTPATIENT)
Dept: INTERNAL MEDICINE | Age: 87
End: 2022-01-01

## 2022-01-01 ENCOUNTER — PATIENT OUTREACH (OUTPATIENT)
Dept: CASE MANAGEMENT | Facility: OTHER | Age: 87
End: 2022-01-01

## 2022-01-01 ENCOUNTER — APPOINTMENT (OUTPATIENT)
Dept: BONE DENSITY | Facility: HOSPITAL | Age: 87
End: 2022-01-01

## 2022-01-01 ENCOUNTER — PATIENT ROUNDING (BHMG ONLY) (OUTPATIENT)
Dept: SURGERY | Facility: CLINIC | Age: 87
End: 2022-01-01

## 2022-01-01 ENCOUNTER — OFFICE VISIT (OUTPATIENT)
Dept: SURGERY | Facility: CLINIC | Age: 87
End: 2022-01-01

## 2022-01-01 ENCOUNTER — APPOINTMENT (OUTPATIENT)
Dept: CT IMAGING | Facility: HOSPITAL | Age: 87
End: 2022-01-01

## 2022-01-01 ENCOUNTER — APPOINTMENT (OUTPATIENT)
Dept: CARDIOLOGY | Facility: HOSPITAL | Age: 87
End: 2022-01-01

## 2022-01-01 ENCOUNTER — HOSPITAL ENCOUNTER (OUTPATIENT)
Dept: GENERAL RADIOLOGY | Facility: HOSPITAL | Age: 87
Discharge: HOME | End: 2022-01-27
Admitting: NURSE PRACTITIONER

## 2022-01-01 ENCOUNTER — HOSPITAL ENCOUNTER (INPATIENT)
Facility: HOSPITAL | Age: 87
LOS: 6 days | Discharge: NURSING, REHAB OR LONG-TERM CARE | End: 2022-06-30
Attending: EMERGENCY MEDICINE | Admitting: INTERNAL MEDICINE

## 2022-01-01 VITALS
OXYGEN SATURATION: 98 % | HEIGHT: 64 IN | HEART RATE: 84 BPM | SYSTOLIC BLOOD PRESSURE: 138 MMHG | DIASTOLIC BLOOD PRESSURE: 82 MMHG | BODY MASS INDEX: 16.73 KG/M2 | WEIGHT: 98 LBS

## 2022-01-01 VITALS
WEIGHT: 88 LBS | DIASTOLIC BLOOD PRESSURE: 70 MMHG | BODY MASS INDEX: 15.03 KG/M2 | OXYGEN SATURATION: 91 % | HEART RATE: 83 BPM | RESPIRATION RATE: 18 BRPM | TEMPERATURE: 99.2 F | HEIGHT: 64 IN | SYSTOLIC BLOOD PRESSURE: 133 MMHG

## 2022-01-01 VITALS
RESPIRATION RATE: 18 BRPM | OXYGEN SATURATION: 93 % | DIASTOLIC BLOOD PRESSURE: 71 MMHG | TEMPERATURE: 98.5 F | SYSTOLIC BLOOD PRESSURE: 138 MMHG | HEART RATE: 84 BPM

## 2022-01-01 VITALS
TEMPERATURE: 98 F | SYSTOLIC BLOOD PRESSURE: 160 MMHG | WEIGHT: 98 LBS | BODY MASS INDEX: 16.73 KG/M2 | RESPIRATION RATE: 18 BRPM | OXYGEN SATURATION: 97 % | HEIGHT: 64 IN | HEART RATE: 72 BPM | DIASTOLIC BLOOD PRESSURE: 81 MMHG

## 2022-01-01 VITALS
TEMPERATURE: 97.5 F | OXYGEN SATURATION: 97 % | SYSTOLIC BLOOD PRESSURE: 134 MMHG | BODY MASS INDEX: 17 KG/M2 | HEIGHT: 64 IN | DIASTOLIC BLOOD PRESSURE: 68 MMHG | WEIGHT: 99.6 LBS | HEART RATE: 84 BPM

## 2022-01-01 DIAGNOSIS — S22.20XA CLOSED FRACTURE OF STERNUM, UNSPECIFIED PORTION OF STERNUM, INITIAL ENCOUNTER: Primary | ICD-10-CM

## 2022-01-01 DIAGNOSIS — S22.21XA CLOSED FRACTURE OF MANUBRIUM, INITIAL ENCOUNTER: ICD-10-CM

## 2022-01-01 DIAGNOSIS — J44.9 CHRONIC OBSTRUCTIVE PULMONARY DISEASE, UNSPECIFIED COPD TYPE: Primary | ICD-10-CM

## 2022-01-01 DIAGNOSIS — J44.1 COPD EXACERBATION: Primary | ICD-10-CM

## 2022-01-01 DIAGNOSIS — M81.0 OSTEOPOROSIS, UNSPECIFIED OSTEOPOROSIS TYPE, UNSPECIFIED PATHOLOGICAL FRACTURE PRESENCE: ICD-10-CM

## 2022-01-01 DIAGNOSIS — I65.23 CAROTID STENOSIS, ASYMPTOMATIC, BILATERAL: ICD-10-CM

## 2022-01-01 DIAGNOSIS — E78.2 MIXED HYPERLIPIDEMIA: ICD-10-CM

## 2022-01-01 DIAGNOSIS — R06.2 WHEEZING: ICD-10-CM

## 2022-01-01 DIAGNOSIS — R54 FRAILTY: ICD-10-CM

## 2022-01-01 DIAGNOSIS — J30.1 SEASONAL ALLERGIC RHINITIS DUE TO POLLEN: Primary | ICD-10-CM

## 2022-01-01 DIAGNOSIS — J44.9 CHRONIC OBSTRUCTIVE PULMONARY DISEASE, UNSPECIFIED COPD TYPE: ICD-10-CM

## 2022-01-01 DIAGNOSIS — E87.6 HYPOKALEMIA: ICD-10-CM

## 2022-01-01 DIAGNOSIS — K56.41 FECAL IMPACTION IN RECTUM: Primary | ICD-10-CM

## 2022-01-01 LAB
ALBUMIN SERPL-MCNC: 2.8 G/DL (ref 3.5–5.2)
ALBUMIN SERPL-MCNC: 3 G/DL (ref 3.5–5.2)
ALBUMIN SERPL-MCNC: 3.2 G/DL (ref 3.5–5.2)
ALBUMIN SERPL-MCNC: 3.3 G/DL (ref 3.5–5.2)
ALBUMIN SERPL-MCNC: 3.5 G/DL (ref 3.5–5.2)
ALBUMIN SERPL-MCNC: 3.6 G/DL (ref 3.5–5.2)
ALBUMIN SERPL-MCNC: 3.7 G/DL (ref 3.5–5.2)
ALBUMIN SERPL-MCNC: 3.7 G/DL (ref 3.5–5.2)
ALBUMIN SERPL-MCNC: 4.4 G/DL (ref 3.5–5.2)
ALBUMIN/GLOB SERPL: 1.1 G/DL
ALBUMIN/GLOB SERPL: 1.2 G/DL
ALBUMIN/GLOB SERPL: 1.3 G/DL
ALBUMIN/GLOB SERPL: 1.6 G/DL
ALBUMIN/GLOB SERPL: 1.7 G/DL
ALBUMIN/GLOB SERPL: 1.7 G/DL
ALBUMIN/GLOB SERPL: 1.9 G/DL
ALP SERPL-CCNC: 59 U/L (ref 39–117)
ALP SERPL-CCNC: 61 U/L (ref 39–117)
ALP SERPL-CCNC: 64 U/L (ref 39–117)
ALP SERPL-CCNC: 66 U/L (ref 39–117)
ALP SERPL-CCNC: 68 U/L (ref 39–117)
ALP SERPL-CCNC: 69 U/L (ref 39–117)
ALP SERPL-CCNC: 69 U/L (ref 39–117)
ALP SERPL-CCNC: 73 U/L (ref 39–117)
ALP SERPL-CCNC: 79 U/L (ref 39–117)
ALT SERPL W P-5'-P-CCNC: 16 U/L (ref 1–33)
ALT SERPL W P-5'-P-CCNC: 21 U/L (ref 1–33)
ALT SERPL W P-5'-P-CCNC: 21 U/L (ref 1–33)
ALT SERPL W P-5'-P-CCNC: 22 U/L (ref 1–33)
ALT SERPL W P-5'-P-CCNC: 23 U/L (ref 1–33)
ALT SERPL W P-5'-P-CCNC: 24 U/L (ref 1–33)
ALT SERPL W P-5'-P-CCNC: 25 U/L (ref 1–33)
ANION GAP SERPL CALCULATED.3IONS-SCNC: 10 MMOL/L (ref 5–15)
ANION GAP SERPL CALCULATED.3IONS-SCNC: 10 MMOL/L (ref 5–15)
ANION GAP SERPL CALCULATED.3IONS-SCNC: 10.2 MMOL/L (ref 5–15)
ANION GAP SERPL CALCULATED.3IONS-SCNC: 10.9 MMOL/L (ref 5–15)
ANION GAP SERPL CALCULATED.3IONS-SCNC: 11 MMOL/L (ref 5–15)
ANION GAP SERPL CALCULATED.3IONS-SCNC: 11.5 MMOL/L (ref 5–15)
ANION GAP SERPL CALCULATED.3IONS-SCNC: 7.8 MMOL/L (ref 5–15)
ANION GAP SERPL CALCULATED.3IONS-SCNC: 8.5 MMOL/L (ref 5–15)
ANION GAP SERPL CALCULATED.3IONS-SCNC: 8.6 MMOL/L (ref 5–15)
ANION GAP SERPL CALCULATED.3IONS-SCNC: 9 MMOL/L (ref 5–15)
ANION GAP SERPL CALCULATED.3IONS-SCNC: 9.2 MMOL/L (ref 5–15)
AORTIC DIMENSIONLESS INDEX: 0.5 (DI)
ARTERIAL PATENCY WRIST A: ABNORMAL
AST SERPL-CCNC: 15 U/L (ref 1–32)
AST SERPL-CCNC: 17 U/L (ref 1–32)
AST SERPL-CCNC: 19 U/L (ref 1–32)
AST SERPL-CCNC: 19 U/L (ref 1–32)
AST SERPL-CCNC: 20 U/L (ref 1–32)
AST SERPL-CCNC: 21 U/L (ref 1–32)
AST SERPL-CCNC: 23 U/L (ref 1–32)
AST SERPL-CCNC: 23 U/L (ref 1–32)
AST SERPL-CCNC: 28 U/L (ref 1–32)
ATMOSPHERIC PRESS: 749.5 MMHG
BACTERIA SPEC AEROBE CULT: ABNORMAL
BACTERIA UR QL AUTO: ABNORMAL /HPF
BASE EXCESS BLDA CALC-SCNC: 0.4 MMOL/L (ref 0–2)
BASOPHILS # BLD AUTO: 0.01 10*3/MM3 (ref 0–0.2)
BASOPHILS # BLD AUTO: 0.02 10*3/MM3 (ref 0–0.2)
BASOPHILS # BLD AUTO: 0.02 10*3/MM3 (ref 0–0.2)
BASOPHILS # BLD AUTO: 0.05 10*3/MM3 (ref 0–0.2)
BASOPHILS # BLD AUTO: 0.06 10*3/MM3 (ref 0–0.2)
BASOPHILS NFR BLD AUTO: 0.1 % (ref 0–1.5)
BASOPHILS NFR BLD AUTO: 0.2 % (ref 0–1.5)
BASOPHILS NFR BLD AUTO: 0.7 % (ref 0–1.5)
BASOPHILS NFR BLD AUTO: 0.7 % (ref 0–1.5)
BDY SITE: ABNORMAL
BH CV ECHO MEAS - AO MAX PG: 36.4 MMHG
BH CV ECHO MEAS - AO MEAN PG: 17.8 MMHG
BH CV ECHO MEAS - AO V2 MAX: 301.5 CM/SEC
BH CV ECHO MEAS - AO V2 VTI: 53.8 CM
BH CV ECHO MEAS - AVA(I,D): 1.55 CM2
BH CV ECHO MEAS - EDV(MOD-SP2): 67 ML
BH CV ECHO MEAS - EDV(MOD-SP4): 93 ML
BH CV ECHO MEAS - EF(MOD-BP): 57.4 %
BH CV ECHO MEAS - EF(MOD-SP2): 56.7 %
BH CV ECHO MEAS - EF(MOD-SP4): 58.1 %
BH CV ECHO MEAS - ESV(MOD-SP2): 29 ML
BH CV ECHO MEAS - ESV(MOD-SP4): 39 ML
BH CV ECHO MEAS - LAT PEAK E' VEL: 5 CM/SEC
BH CV ECHO MEAS - LV MAX PG: 9.8 MMHG
BH CV ECHO MEAS - LV MEAN PG: 5.8 MMHG
BH CV ECHO MEAS - LV V1 MAX: 156.7 CM/SEC
BH CV ECHO MEAS - LV V1 VTI: 27.1 CM
BH CV ECHO MEAS - LVOT AREA: 3.1 CM2
BH CV ECHO MEAS - LVOT DIAM: 1.98 CM
BH CV ECHO MEAS - MED PEAK E' VEL: 5.9 CM/SEC
BH CV ECHO MEAS - MV A DUR: 0.14 SEC
BH CV ECHO MEAS - MV A MAX VEL: 129.8 CM/SEC
BH CV ECHO MEAS - MV DEC SLOPE: 329 CM/SEC2
BH CV ECHO MEAS - MV DEC TIME: 338 MSEC
BH CV ECHO MEAS - MV E MAX VEL: 82 CM/SEC
BH CV ECHO MEAS - MV E/A: 0.63
BH CV ECHO MEAS - MV MAX PG: 10.2 MMHG
BH CV ECHO MEAS - MV MEAN PG: 4 MMHG
BH CV ECHO MEAS - MV P1/2T: 80.4 MSEC
BH CV ECHO MEAS - MV V2 VTI: 28.4 CM
BH CV ECHO MEAS - MVA(P1/2T): 2.7 CM2
BH CV ECHO MEAS - MVA(VTI): 2.9 CM2
BH CV ECHO MEAS - PULM A REVS DUR: 0.19 SEC
BH CV ECHO MEAS - PULM A REVS VEL: 34.6 CM/SEC
BH CV ECHO MEAS - PULM DIAS VEL: 25.7 CM/SEC
BH CV ECHO MEAS - PULM S/D: 2.6
BH CV ECHO MEAS - PULM SYS VEL: 66.6 CM/SEC
BH CV ECHO MEAS - RAP SYSTOLE: 3 MMHG
BH CV ECHO MEAS - RVSP: 30.4 MMHG
BH CV ECHO MEAS - SV(LVOT): 83.6 ML
BH CV ECHO MEAS - SV(MOD-SP2): 38 ML
BH CV ECHO MEAS - SV(MOD-SP4): 54 ML
BH CV ECHO MEAS - TAPSE (>1.6): 2.9 CM
BH CV ECHO MEAS - TR MAX PG: 27.4 MMHG
BH CV ECHO MEAS - TR MAX VEL: 261.8 CM/SEC
BH CV ECHO MEASUREMENTS AVERAGE E/E' RATIO: 15.05
BH CV VAS BP RIGHT ARM: NORMAL MMHG
BH CV XLRA - RV BASE: 3 CM
BH CV XLRA - RV LENGTH: 6.9 CM
BH CV XLRA - RV MID: 1.85 CM
BH CV XLRA - TDI S': 15.3 CM/SEC
BILIRUB SERPL-MCNC: 0.3 MG/DL (ref 0–1.2)
BILIRUB SERPL-MCNC: 0.4 MG/DL (ref 0–1.2)
BILIRUB SERPL-MCNC: 0.4 MG/DL (ref 0–1.2)
BILIRUB SERPL-MCNC: 0.5 MG/DL (ref 0–1.2)
BILIRUB SERPL-MCNC: 0.6 MG/DL (ref 0–1.2)
BILIRUB UR QL STRIP: NEGATIVE
BUN SERPL-MCNC: 11 MG/DL (ref 8–23)
BUN SERPL-MCNC: 13 MG/DL (ref 8–23)
BUN SERPL-MCNC: 19 MG/DL (ref 8–23)
BUN SERPL-MCNC: 19 MG/DL (ref 8–23)
BUN SERPL-MCNC: 20 MG/DL (ref 8–23)
BUN SERPL-MCNC: 21 MG/DL (ref 8–23)
BUN SERPL-MCNC: 24 MG/DL (ref 8–23)
BUN SERPL-MCNC: 24 MG/DL (ref 8–23)
BUN SERPL-MCNC: 26 MG/DL (ref 8–23)
BUN/CREAT SERPL: 16.5 (ref 7–25)
BUN/CREAT SERPL: 18.6 (ref 7–25)
BUN/CREAT SERPL: 24.1 (ref 7–25)
BUN/CREAT SERPL: 27.6 (ref 7–25)
BUN/CREAT SERPL: 30.2 (ref 7–25)
BUN/CREAT SERPL: 31.1 (ref 7–25)
BUN/CREAT SERPL: 31.3 (ref 7–25)
BUN/CREAT SERPL: 31.6 (ref 7–25)
BUN/CREAT SERPL: 33.3 (ref 7–25)
BUN/CREAT SERPL: 35.8 (ref 7–25)
BUN/CREAT SERPL: 39.4 (ref 7–25)
CALCIUM SPEC-SCNC: 7 MG/DL (ref 8.2–9.6)
CALCIUM SPEC-SCNC: 7.7 MG/DL (ref 8.2–9.6)
CALCIUM SPEC-SCNC: 8.1 MG/DL (ref 8.2–9.6)
CALCIUM SPEC-SCNC: 8.2 MG/DL (ref 8.2–9.6)
CALCIUM SPEC-SCNC: 8.2 MG/DL (ref 8.2–9.6)
CALCIUM SPEC-SCNC: 8.3 MG/DL (ref 8.2–9.6)
CALCIUM SPEC-SCNC: 8.4 MG/DL (ref 8.2–9.6)
CALCIUM SPEC-SCNC: 8.4 MG/DL (ref 8.2–9.6)
CALCIUM SPEC-SCNC: 8.5 MG/DL (ref 8.2–9.6)
CALCIUM SPEC-SCNC: 8.9 MG/DL (ref 8.2–9.6)
CALCIUM SPEC-SCNC: 9 MG/DL (ref 8.2–9.6)
CHLORIDE SERPL-SCNC: 103 MMOL/L (ref 98–107)
CHLORIDE SERPL-SCNC: 105 MMOL/L (ref 98–107)
CHLORIDE SERPL-SCNC: 94 MMOL/L (ref 98–107)
CHLORIDE SERPL-SCNC: 95 MMOL/L (ref 98–107)
CHLORIDE SERPL-SCNC: 96 MMOL/L (ref 98–107)
CHLORIDE SERPL-SCNC: 97 MMOL/L (ref 98–107)
CHLORIDE SERPL-SCNC: 97 MMOL/L (ref 98–107)
CHLORIDE SERPL-SCNC: 99 MMOL/L (ref 98–107)
CHOLEST SERPL-MCNC: 172 MG/DL (ref 0–200)
CLARITY UR: ABNORMAL
CO2 SERPL-SCNC: 21.8 MMOL/L (ref 22–29)
CO2 SERPL-SCNC: 26 MMOL/L (ref 22–29)
CO2 SERPL-SCNC: 27 MMOL/L (ref 22–29)
CO2 SERPL-SCNC: 27 MMOL/L (ref 22–29)
CO2 SERPL-SCNC: 27.5 MMOL/L (ref 22–29)
CO2 SERPL-SCNC: 28 MMOL/L (ref 22–29)
CO2 SERPL-SCNC: 28.1 MMOL/L (ref 22–29)
CO2 SERPL-SCNC: 28.2 MMOL/L (ref 22–29)
CO2 SERPL-SCNC: 28.5 MMOL/L (ref 22–29)
CO2 SERPL-SCNC: 28.8 MMOL/L (ref 22–29)
CO2 SERPL-SCNC: 29.4 MMOL/L (ref 22–29)
COD CRY URNS QL: ABNORMAL /HPF
COLOR UR: ABNORMAL
CREAT SERPL-MCNC: 0.59 MG/DL (ref 0.57–1)
CREAT SERPL-MCNC: 0.61 MG/DL (ref 0.57–1)
CREAT SERPL-MCNC: 0.63 MG/DL (ref 0.57–1)
CREAT SERPL-MCNC: 0.63 MG/DL (ref 0.57–1)
CREAT SERPL-MCNC: 0.66 MG/DL (ref 0.57–1)
CREAT SERPL-MCNC: 0.67 MG/DL (ref 0.57–1)
CREAT SERPL-MCNC: 0.67 MG/DL (ref 0.57–1)
CREAT SERPL-MCNC: 0.76 MG/DL (ref 0.57–1)
CREAT SERPL-MCNC: 0.76 MG/DL (ref 0.57–1)
CREAT SERPL-MCNC: 0.79 MG/DL (ref 0.57–1)
CREAT SERPL-MCNC: 0.83 MG/DL (ref 0.57–1)
DEPRECATED RDW RBC AUTO: 40 FL (ref 37–54)
DEPRECATED RDW RBC AUTO: 40.5 FL (ref 37–54)
DEPRECATED RDW RBC AUTO: 40.5 FL (ref 37–54)
DEPRECATED RDW RBC AUTO: 40.6 FL (ref 37–54)
DEPRECATED RDW RBC AUTO: 40.8 FL (ref 37–54)
DEPRECATED RDW RBC AUTO: 41.4 FL (ref 37–54)
DEPRECATED RDW RBC AUTO: 41.6 FL (ref 37–54)
DEPRECATED RDW RBC AUTO: 41.9 FL (ref 37–54)
DEPRECATED RDW RBC AUTO: 42.3 FL (ref 37–54)
DEPRECATED RDW RBC AUTO: 42.7 FL (ref 37–54)
DEPRECATED RDW RBC AUTO: 44.3 FL (ref 37–54)
EGFRCR SERPLBLD CKD-EPI 2021: 66.2 ML/MIN/1.73
EGFRCR SERPLBLD CKD-EPI 2021: 70.3 ML/MIN/1.73
EGFRCR SERPLBLD CKD-EPI 2021: 73.6 ML/MIN/1.73
EGFRCR SERPLBLD CKD-EPI 2021: 73.6 ML/MIN/1.73
EGFRCR SERPLBLD CKD-EPI 2021: 82.1 ML/MIN/1.73
EGFRCR SERPLBLD CKD-EPI 2021: 82.1 ML/MIN/1.73
EGFRCR SERPLBLD CKD-EPI 2021: 82.4 ML/MIN/1.73
EGFRCR SERPLBLD CKD-EPI 2021: 83.3 ML/MIN/1.73
EGFRCR SERPLBLD CKD-EPI 2021: 83.3 ML/MIN/1.73
EGFRCR SERPLBLD CKD-EPI 2021: 84 ML/MIN/1.73
EGFRCR SERPLBLD CKD-EPI 2021: 84.7 ML/MIN/1.73
EOSINOPHIL # BLD AUTO: 0 10*3/MM3 (ref 0–0.4)
EOSINOPHIL # BLD AUTO: 0 10*3/MM3 (ref 0–0.4)
EOSINOPHIL # BLD AUTO: 0.02 10*3/MM3 (ref 0–0.4)
EOSINOPHIL # BLD AUTO: 0.06 10*3/MM3 (ref 0–0.4)
EOSINOPHIL # BLD AUTO: 0.07 10*3/MM3 (ref 0–0.4)
EOSINOPHIL # BLD AUTO: 0.11 10*3/MM3 (ref 0–0.4)
EOSINOPHIL # BLD AUTO: 0.15 10*3/MM3 (ref 0–0.4)
EOSINOPHIL # BLD AUTO: 0.16 10*3/MM3 (ref 0–0.4)
EOSINOPHIL # BLD AUTO: 0.25 10*3/MM3 (ref 0–0.4)
EOSINOPHIL NFR BLD AUTO: 0 % (ref 0.3–6.2)
EOSINOPHIL NFR BLD AUTO: 0 % (ref 0.3–6.2)
EOSINOPHIL NFR BLD AUTO: 0.2 % (ref 0.3–6.2)
EOSINOPHIL NFR BLD AUTO: 0.7 % (ref 0.3–6.2)
EOSINOPHIL NFR BLD AUTO: 0.8 % (ref 0.3–6.2)
EOSINOPHIL NFR BLD AUTO: 1.2 % (ref 0.3–6.2)
EOSINOPHIL NFR BLD AUTO: 1.4 % (ref 0.3–6.2)
EOSINOPHIL NFR BLD AUTO: 1.9 % (ref 0.3–6.2)
EOSINOPHIL NFR BLD AUTO: 2.3 % (ref 0.3–6.2)
ERYTHROCYTE [DISTWIDTH] IN BLOOD BY AUTOMATED COUNT: 14 % (ref 12.3–15.4)
ERYTHROCYTE [DISTWIDTH] IN BLOOD BY AUTOMATED COUNT: 14.1 % (ref 12.3–15.4)
ERYTHROCYTE [DISTWIDTH] IN BLOOD BY AUTOMATED COUNT: 14.1 % (ref 12.3–15.4)
ERYTHROCYTE [DISTWIDTH] IN BLOOD BY AUTOMATED COUNT: 14.3 % (ref 12.3–15.4)
ERYTHROCYTE [DISTWIDTH] IN BLOOD BY AUTOMATED COUNT: 14.4 % (ref 12.3–15.4)
ERYTHROCYTE [DISTWIDTH] IN BLOOD BY AUTOMATED COUNT: 14.5 % (ref 12.3–15.4)
GAS FLOW AIRWAY: 2.5 LPM
GLOBULIN UR ELPH-MCNC: 2 GM/DL
GLOBULIN UR ELPH-MCNC: 2 GM/DL
GLOBULIN UR ELPH-MCNC: 2.2 GM/DL
GLOBULIN UR ELPH-MCNC: 2.2 GM/DL
GLOBULIN UR ELPH-MCNC: 2.3 GM/DL
GLOBULIN UR ELPH-MCNC: 2.5 GM/DL
GLOBULIN UR ELPH-MCNC: 2.6 GM/DL
GLUCOSE SERPL-MCNC: 103 MG/DL (ref 65–99)
GLUCOSE SERPL-MCNC: 73 MG/DL (ref 65–99)
GLUCOSE SERPL-MCNC: 80 MG/DL (ref 65–99)
GLUCOSE SERPL-MCNC: 84 MG/DL (ref 65–99)
GLUCOSE SERPL-MCNC: 87 MG/DL (ref 65–99)
GLUCOSE SERPL-MCNC: 89 MG/DL (ref 65–99)
GLUCOSE SERPL-MCNC: 90 MG/DL (ref 65–99)
GLUCOSE SERPL-MCNC: 93 MG/DL (ref 65–99)
GLUCOSE SERPL-MCNC: 95 MG/DL (ref 65–99)
GLUCOSE UR STRIP-MCNC: NEGATIVE MG/DL
HCO3 BLDA-SCNC: 23.9 MMOL/L (ref 22–28)
HCT VFR BLD AUTO: 34.2 % (ref 34–46.6)
HCT VFR BLD AUTO: 35.5 % (ref 34–46.6)
HCT VFR BLD AUTO: 36 % (ref 34–46.6)
HCT VFR BLD AUTO: 36.1 % (ref 34–46.6)
HCT VFR BLD AUTO: 36.7 % (ref 34–46.6)
HCT VFR BLD AUTO: 37.2 % (ref 34–46.6)
HCT VFR BLD AUTO: 37.4 % (ref 34–46.6)
HCT VFR BLD AUTO: 38.1 % (ref 34–46.6)
HCT VFR BLD AUTO: 38.5 % (ref 34–46.6)
HCT VFR BLD AUTO: 38.7 % (ref 34–46.6)
HCT VFR BLD AUTO: 40.6 % (ref 34–46.6)
HDLC SERPL QL: 1.29
HDLC SERPL-MCNC: 133 MG/DL (ref 40–60)
HGB BLD-MCNC: 11.2 G/DL (ref 12–15.9)
HGB BLD-MCNC: 11.5 G/DL (ref 12–15.9)
HGB BLD-MCNC: 11.8 G/DL (ref 12–15.9)
HGB BLD-MCNC: 11.8 G/DL (ref 12–15.9)
HGB BLD-MCNC: 11.9 G/DL (ref 12–15.9)
HGB BLD-MCNC: 12 G/DL (ref 12–15.9)
HGB BLD-MCNC: 12.2 G/DL (ref 12–15.9)
HGB BLD-MCNC: 12.3 G/DL (ref 12–15.9)
HGB BLD-MCNC: 13 G/DL (ref 12–15.9)
HGB UR QL STRIP.AUTO: ABNORMAL
HOLD SPECIMEN: NORMAL
HOLD SPECIMEN: NORMAL
HYALINE CASTS UR QL AUTO: ABNORMAL /LPF
IMM GRANULOCYTES # BLD AUTO: 0.01 10*3/MM3 (ref 0–0.05)
IMM GRANULOCYTES # BLD AUTO: 0.02 10*3/MM3 (ref 0–0.05)
IMM GRANULOCYTES # BLD AUTO: 0.03 10*3/MM3 (ref 0–0.05)
IMM GRANULOCYTES # BLD AUTO: 0.03 10*3/MM3 (ref 0–0.05)
IMM GRANULOCYTES # BLD AUTO: 0.05 10*3/MM3 (ref 0–0.05)
IMM GRANULOCYTES # BLD AUTO: 0.06 10*3/MM3 (ref 0–0.05)
IMM GRANULOCYTES # BLD AUTO: 0.06 10*3/MM3 (ref 0–0.05)
IMM GRANULOCYTES # BLD AUTO: 0.07 10*3/MM3 (ref 0–0.05)
IMM GRANULOCYTES # BLD AUTO: 0.09 10*3/MM3 (ref 0–0.05)
IMM GRANULOCYTES NFR BLD AUTO: 0.1 % (ref 0–0.5)
IMM GRANULOCYTES NFR BLD AUTO: 0.3 % (ref 0–0.5)
IMM GRANULOCYTES NFR BLD AUTO: 0.3 % (ref 0–0.5)
IMM GRANULOCYTES NFR BLD AUTO: 0.4 % (ref 0–0.5)
IMM GRANULOCYTES NFR BLD AUTO: 0.5 % (ref 0–0.5)
IMM GRANULOCYTES NFR BLD AUTO: 0.6 % (ref 0–0.5)
IMM GRANULOCYTES NFR BLD AUTO: 0.6 % (ref 0–0.5)
KETONES UR QL STRIP: ABNORMAL
LDLC SERPL CALC-MCNC: 30 MG/DL (ref 0–100)
LEFT ATRIUM VOLUME INDEX: 37.2 ML/M2
LEUKOCYTE ESTERASE UR QL STRIP.AUTO: ABNORMAL
LIPASE SERPL-CCNC: 24 U/L (ref 13–60)
LV EF 2D ECHO EST: 57 %
LYMPHOCYTES # BLD AUTO: 0.68 10*3/MM3 (ref 0.7–3.1)
LYMPHOCYTES # BLD AUTO: 1.08 10*3/MM3 (ref 0.7–3.1)
LYMPHOCYTES # BLD AUTO: 1.23 10*3/MM3 (ref 0.7–3.1)
LYMPHOCYTES # BLD AUTO: 1.28 10*3/MM3 (ref 0.7–3.1)
LYMPHOCYTES # BLD AUTO: 1.35 10*3/MM3 (ref 0.7–3.1)
LYMPHOCYTES # BLD AUTO: 1.46 10*3/MM3 (ref 0.7–3.1)
LYMPHOCYTES # BLD AUTO: 1.66 10*3/MM3 (ref 0.7–3.1)
LYMPHOCYTES # BLD AUTO: 1.7 10*3/MM3 (ref 0.7–3.1)
LYMPHOCYTES # BLD AUTO: 2 10*3/MM3 (ref 0.7–3.1)
LYMPHOCYTES NFR BLD AUTO: 11.4 % (ref 19.6–45.3)
LYMPHOCYTES NFR BLD AUTO: 11.4 % (ref 19.6–45.3)
LYMPHOCYTES NFR BLD AUTO: 12.9 % (ref 19.6–45.3)
LYMPHOCYTES NFR BLD AUTO: 15.7 % (ref 19.6–45.3)
LYMPHOCYTES NFR BLD AUTO: 15.9 % (ref 19.6–45.3)
LYMPHOCYTES NFR BLD AUTO: 16.1 % (ref 19.6–45.3)
LYMPHOCYTES NFR BLD AUTO: 19.2 % (ref 19.6–45.3)
LYMPHOCYTES NFR BLD AUTO: 8.4 % (ref 19.6–45.3)
LYMPHOCYTES NFR BLD AUTO: 9.9 % (ref 19.6–45.3)
MAGNESIUM SERPL-MCNC: 2.9 MG/DL (ref 1.7–2.3)
MAXIMAL PREDICTED HEART RATE: 128 BPM
MCH RBC QN AUTO: 25.9 PG (ref 26.6–33)
MCH RBC QN AUTO: 25.9 PG (ref 26.6–33)
MCH RBC QN AUTO: 26.1 PG (ref 26.6–33)
MCH RBC QN AUTO: 26.1 PG (ref 26.6–33)
MCH RBC QN AUTO: 26.2 PG (ref 26.6–33)
MCH RBC QN AUTO: 26.3 PG (ref 26.6–33)
MCH RBC QN AUTO: 26.4 PG (ref 26.6–33)
MCH RBC QN AUTO: 26.5 PG (ref 26.6–33)
MCH RBC QN AUTO: 26.8 PG (ref 26.6–33)
MCH RBC QN AUTO: 26.9 PG (ref 26.6–33)
MCH RBC QN AUTO: 27.4 PG (ref 26.6–33)
MCHC RBC AUTO-ENTMCNC: 31.5 G/DL (ref 31.5–35.7)
MCHC RBC AUTO-ENTMCNC: 31.8 G/DL (ref 31.5–35.7)
MCHC RBC AUTO-ENTMCNC: 31.8 G/DL (ref 31.5–35.7)
MCHC RBC AUTO-ENTMCNC: 31.9 G/DL (ref 31.5–35.7)
MCHC RBC AUTO-ENTMCNC: 32 G/DL (ref 31.5–35.7)
MCHC RBC AUTO-ENTMCNC: 32.3 G/DL (ref 31.5–35.7)
MCHC RBC AUTO-ENTMCNC: 32.7 G/DL (ref 31.5–35.7)
MCHC RBC AUTO-ENTMCNC: 32.7 G/DL (ref 31.5–35.7)
MCHC RBC AUTO-ENTMCNC: 32.8 G/DL (ref 31.5–35.7)
MCHC RBC AUTO-ENTMCNC: 32.8 G/DL (ref 31.5–35.7)
MCHC RBC AUTO-ENTMCNC: 33.6 G/DL (ref 31.5–35.7)
MCV RBC AUTO: 79.7 FL (ref 79–97)
MCV RBC AUTO: 80.3 FL (ref 79–97)
MCV RBC AUTO: 80.5 FL (ref 79–97)
MCV RBC AUTO: 80.7 FL (ref 79–97)
MCV RBC AUTO: 80.9 FL (ref 79–97)
MCV RBC AUTO: 81.3 FL (ref 79–97)
MCV RBC AUTO: 81.4 FL (ref 79–97)
MCV RBC AUTO: 81.5 FL (ref 79–97)
MCV RBC AUTO: 81.9 FL (ref 79–97)
MCV RBC AUTO: 82.4 FL (ref 79–97)
MCV RBC AUTO: 86.8 FL (ref 79–97)
MODALITY: ABNORMAL
MONOCYTES # BLD AUTO: 0.55 10*3/MM3 (ref 0.1–0.9)
MONOCYTES # BLD AUTO: 0.62 10*3/MM3 (ref 0.1–0.9)
MONOCYTES # BLD AUTO: 0.64 10*3/MM3 (ref 0.1–0.9)
MONOCYTES # BLD AUTO: 0.67 10*3/MM3 (ref 0.1–0.9)
MONOCYTES # BLD AUTO: 0.71 10*3/MM3 (ref 0.1–0.9)
MONOCYTES # BLD AUTO: 0.76 10*3/MM3 (ref 0.1–0.9)
MONOCYTES # BLD AUTO: 0.85 10*3/MM3 (ref 0.1–0.9)
MONOCYTES # BLD AUTO: 0.87 10*3/MM3 (ref 0.1–0.9)
MONOCYTES # BLD AUTO: 0.98 10*3/MM3 (ref 0.1–0.9)
MONOCYTES NFR BLD AUTO: 6 % (ref 5–12)
MONOCYTES NFR BLD AUTO: 6.6 % (ref 5–12)
MONOCYTES NFR BLD AUTO: 6.7 % (ref 5–12)
MONOCYTES NFR BLD AUTO: 6.9 % (ref 5–12)
MONOCYTES NFR BLD AUTO: 7.3 % (ref 5–12)
MONOCYTES NFR BLD AUTO: 7.4 % (ref 5–12)
MONOCYTES NFR BLD AUTO: 7.7 % (ref 5–12)
MONOCYTES NFR BLD AUTO: 7.9 % (ref 5–12)
MONOCYTES NFR BLD AUTO: 8 % (ref 5–12)
NEUTROPHILS NFR BLD AUTO: 10.22 10*3/MM3 (ref 1.7–7)
NEUTROPHILS NFR BLD AUTO: 10.33 10*3/MM3 (ref 1.7–7)
NEUTROPHILS NFR BLD AUTO: 11.87 10*3/MM3 (ref 1.7–7)
NEUTROPHILS NFR BLD AUTO: 5.01 10*3/MM3 (ref 1.7–7)
NEUTROPHILS NFR BLD AUTO: 6.27 10*3/MM3 (ref 1.7–7)
NEUTROPHILS NFR BLD AUTO: 6.63 10*3/MM3 (ref 1.7–7)
NEUTROPHILS NFR BLD AUTO: 6.87 10*3/MM3 (ref 1.7–7)
NEUTROPHILS NFR BLD AUTO: 7.49 10*3/MM3 (ref 1.7–7)
NEUTROPHILS NFR BLD AUTO: 71.7 % (ref 42.7–76)
NEUTROPHILS NFR BLD AUTO: 73 % (ref 42.7–76)
NEUTROPHILS NFR BLD AUTO: 74.6 % (ref 42.7–76)
NEUTROPHILS NFR BLD AUTO: 74.9 % (ref 42.7–76)
NEUTROPHILS NFR BLD AUTO: 78 % (ref 42.7–76)
NEUTROPHILS NFR BLD AUTO: 81.2 % (ref 42.7–76)
NEUTROPHILS NFR BLD AUTO: 81.9 % (ref 42.7–76)
NEUTROPHILS NFR BLD AUTO: 82.4 % (ref 42.7–76)
NEUTROPHILS NFR BLD AUTO: 82.4 % (ref 42.7–76)
NEUTROPHILS NFR BLD AUTO: 9.16 10*3/MM3 (ref 1.7–7)
NITRITE UR QL STRIP: POSITIVE
NRBC BLD AUTO-RTO: 0 /100 WBC (ref 0–0.2)
NT-PROBNP SERPL-MCNC: 765 PG/ML (ref 0–1800)
NT-PROBNP SERPL-MCNC: 807 PG/ML (ref 0–1800)
PCO2 BLDA: 34.1 MM HG (ref 35–45)
PH BLDA: 7.45 PH UNITS (ref 7.35–7.45)
PH UR STRIP.AUTO: >=9 [PH] (ref 5–8)
PLATELET # BLD AUTO: 272 10*3/MM3 (ref 140–450)
PLATELET # BLD AUTO: 297 10*3/MM3 (ref 140–450)
PLATELET # BLD AUTO: 351 10*3/MM3 (ref 140–450)
PLATELET # BLD AUTO: 375 10*3/MM3 (ref 140–450)
PLATELET # BLD AUTO: 398 10*3/MM3 (ref 140–450)
PLATELET # BLD AUTO: 402 10*3/MM3 (ref 140–450)
PLATELET # BLD AUTO: 412 10*3/MM3 (ref 140–450)
PLATELET # BLD AUTO: 429 10*3/MM3 (ref 140–450)
PLATELET # BLD AUTO: 438 10*3/MM3 (ref 140–450)
PLATELET # BLD AUTO: 448 10*3/MM3 (ref 140–450)
PLATELET # BLD AUTO: 451 10*3/MM3 (ref 140–450)
PMV BLD AUTO: 8.4 FL (ref 6–12)
PMV BLD AUTO: 8.6 FL (ref 6–12)
PMV BLD AUTO: 8.7 FL (ref 6–12)
PMV BLD AUTO: 9 FL (ref 6–12)
PMV BLD AUTO: 9.1 FL (ref 6–12)
PO2 BLDA: 63.2 MM HG (ref 80–100)
POTASSIUM SERPL-SCNC: 3 MMOL/L (ref 3.5–5.2)
POTASSIUM SERPL-SCNC: 3.2 MMOL/L (ref 3.5–5.2)
POTASSIUM SERPL-SCNC: 3.8 MMOL/L (ref 3.5–5.2)
POTASSIUM SERPL-SCNC: 3.8 MMOL/L (ref 3.5–5.2)
POTASSIUM SERPL-SCNC: 3.9 MMOL/L (ref 3.5–5.2)
POTASSIUM SERPL-SCNC: 4 MMOL/L (ref 3.5–5.2)
POTASSIUM SERPL-SCNC: 4.1 MMOL/L (ref 3.5–5.2)
POTASSIUM SERPL-SCNC: 4.2 MMOL/L (ref 3.5–5.2)
POTASSIUM SERPL-SCNC: 4.2 MMOL/L (ref 3.5–5.2)
PROT SERPL-MCNC: 5.2 G/DL (ref 6–8.5)
PROT SERPL-MCNC: 5.4 G/DL (ref 6–8.5)
PROT SERPL-MCNC: 5.5 G/DL (ref 6–8.5)
PROT SERPL-MCNC: 5.7 G/DL (ref 6–8.5)
PROT SERPL-MCNC: 5.7 G/DL (ref 6–8.5)
PROT SERPL-MCNC: 5.9 G/DL (ref 6–8.5)
PROT SERPL-MCNC: 7 G/DL (ref 6–8.5)
PROT UR QL STRIP: ABNORMAL
QT INTERVAL: 378 MS
QT INTERVAL: 414 MS
RBC # BLD AUTO: 4.09 10*6/MM3 (ref 3.77–5.28)
RBC # BLD AUTO: 4.29 10*6/MM3 (ref 3.77–5.28)
RBC # BLD AUTO: 4.38 10*6/MM3 (ref 3.77–5.28)
RBC # BLD AUTO: 4.46 10*6/MM3 (ref 3.77–5.28)
RBC # BLD AUTO: 4.57 10*6/MM3 (ref 3.77–5.28)
RBC # BLD AUTO: 4.6 10*6/MM3 (ref 3.77–5.28)
RBC # BLD AUTO: 4.62 10*6/MM3 (ref 3.77–5.28)
RBC # BLD AUTO: 4.7 10*6/MM3 (ref 3.77–5.28)
RBC # BLD AUTO: 4.71 10*6/MM3 (ref 3.77–5.28)
RBC # BLD AUTO: 4.75 10*6/MM3 (ref 3.77–5.28)
RBC # BLD AUTO: 4.99 10*6/MM3 (ref 3.77–5.28)
RBC # UR STRIP: ABNORMAL /HPF
REF LAB TEST METHOD: ABNORMAL
SAO2 % BLDCOA: 93 % (ref 92–99)
SARS-COV-2 ORF1AB RESP QL NAA+PROBE: NOT DETECTED
SARS-COV-2 RNA RESP QL NAA+PROBE: NOT DETECTED
SODIUM SERPL-SCNC: 131 MMOL/L (ref 136–145)
SODIUM SERPL-SCNC: 131 MMOL/L (ref 136–145)
SODIUM SERPL-SCNC: 133 MMOL/L (ref 136–145)
SODIUM SERPL-SCNC: 134 MMOL/L (ref 136–145)
SODIUM SERPL-SCNC: 135 MMOL/L (ref 136–145)
SODIUM SERPL-SCNC: 136 MMOL/L (ref 136–145)
SODIUM SERPL-SCNC: 137 MMOL/L (ref 136–145)
SODIUM SERPL-SCNC: 142 MMOL/L (ref 136–145)
SP GR UR STRIP: <=1.005 (ref 1–1.03)
SQUAMOUS #/AREA URNS HPF: ABNORMAL /HPF
STRESS TARGET HR: 109 BPM
T4 FREE SERPL-MCNC: 1.33 NG/DL (ref 0.93–1.7)
TOTAL RATE: 20 BREATHS/MINUTE
TRIGL SERPL-MCNC: 41 MG/DL (ref 0–150)
TROPONIN T SERPL-MCNC: <0.01 NG/ML (ref 0–0.03)
TROPONIN T SERPL-MCNC: <0.01 NG/ML (ref 0–0.03)
TSH SERPL DL<=0.05 MIU/L-ACNC: 1.12 UIU/ML (ref 0.27–4.2)
UROBILINOGEN UR QL STRIP: ABNORMAL
VLDLC SERPL-MCNC: 9 MG/DL (ref 5–40)
WBC # UR STRIP: ABNORMAL /HPF
WBC NRBC COR # BLD: 10.44 10*3/MM3 (ref 3.4–10.8)
WBC NRBC COR # BLD: 10.73 10*3/MM3 (ref 3.4–10.8)
WBC NRBC COR # BLD: 11.19 10*3/MM3 (ref 3.4–10.8)
WBC NRBC COR # BLD: 12.39 10*3/MM3 (ref 3.4–10.8)
WBC NRBC COR # BLD: 13.22 10*3/MM3 (ref 3.4–10.8)
WBC NRBC COR # BLD: 14.62 10*3/MM3 (ref 3.4–10.8)
WBC NRBC COR # BLD: 6.87 10*3/MM3 (ref 3.4–10.8)
WBC NRBC COR # BLD: 8.06 10*3/MM3 (ref 3.4–10.8)
WBC NRBC COR # BLD: 8.36 10*3/MM3 (ref 3.4–10.8)
WBC NRBC COR # BLD: 8.38 10*3/MM3 (ref 3.4–10.8)
WBC NRBC COR # BLD: 9.21 10*3/MM3 (ref 3.4–10.8)
WHOLE BLOOD HOLD COAG: NORMAL
WHOLE BLOOD HOLD SPECIMEN: NORMAL

## 2022-01-01 PROCEDURE — 99231 SBSQ HOSP IP/OBS SF/LOW 25: CPT | Performed by: SURGERY

## 2022-01-01 PROCEDURE — 74176 CT ABD & PELVIS W/O CONTRAST: CPT

## 2022-01-01 PROCEDURE — 80053 COMPREHEN METABOLIC PANEL: CPT | Performed by: INTERNAL MEDICINE

## 2022-01-01 PROCEDURE — 36415 COLL VENOUS BLD VENIPUNCTURE: CPT

## 2022-01-01 PROCEDURE — 99202 OFFICE O/P NEW SF 15 MIN: CPT | Performed by: THORACIC SURGERY (CARDIOTHORACIC VASCULAR SURGERY)

## 2022-01-01 PROCEDURE — 83880 ASSAY OF NATRIURETIC PEPTIDE: CPT | Performed by: PHYSICIAN ASSISTANT

## 2022-01-01 PROCEDURE — 94799 UNLISTED PULMONARY SVC/PX: CPT

## 2022-01-01 PROCEDURE — 25010000002 ENOXAPARIN PER 10 MG: Performed by: INTERNAL MEDICINE

## 2022-01-01 PROCEDURE — 63710000001 PREDNISONE PER 1 MG: Performed by: INTERNAL MEDICINE

## 2022-01-01 PROCEDURE — 94760 N-INVAS EAR/PLS OXIMETRY 1: CPT

## 2022-01-01 PROCEDURE — U0005 INFEC AGEN DETEC AMPLI PROBE: HCPCS | Performed by: INTERNAL MEDICINE

## 2022-01-01 PROCEDURE — 74018 RADEX ABDOMEN 1 VIEW: CPT

## 2022-01-01 PROCEDURE — 80061 LIPID PANEL: CPT | Performed by: NURSE PRACTITIONER

## 2022-01-01 PROCEDURE — 94664 DEMO&/EVAL PT USE INHALER: CPT

## 2022-01-01 PROCEDURE — U0003 INFECTIOUS AGENT DETECTION BY NUCLEIC ACID (DNA OR RNA); SEVERE ACUTE RESPIRATORY SYNDROME CORONAVIRUS 2 (SARS-COV-2) (CORONAVIRUS DISEASE [COVID-19]), AMPLIFIED PROBE TECHNIQUE, MAKING USE OF HIGH THROUGHPUT TECHNOLOGIES AS DESCRIBED BY CMS-2020-01-R: HCPCS | Performed by: EMERGENCY MEDICINE

## 2022-01-01 PROCEDURE — 87077 CULTURE AEROBIC IDENTIFY: CPT | Performed by: NURSE PRACTITIONER

## 2022-01-01 PROCEDURE — U0005 INFEC AGEN DETEC AMPLI PROBE: HCPCS | Performed by: PHYSICIAN ASSISTANT

## 2022-01-01 PROCEDURE — 97530 THERAPEUTIC ACTIVITIES: CPT | Performed by: PHYSICAL THERAPIST

## 2022-01-01 PROCEDURE — U0005 INFEC AGEN DETEC AMPLI PROBE: HCPCS | Performed by: EMERGENCY MEDICINE

## 2022-01-01 PROCEDURE — U0003 INFECTIOUS AGENT DETECTION BY NUCLEIC ACID (DNA OR RNA); SEVERE ACUTE RESPIRATORY SYNDROME CORONAVIRUS 2 (SARS-COV-2) (CORONAVIRUS DISEASE [COVID-19]), AMPLIFIED PROBE TECHNIQUE, MAKING USE OF HIGH THROUGHPUT TECHNOLOGIES AS DESCRIBED BY CMS-2020-01-R: HCPCS | Performed by: PHYSICIAN ASSISTANT

## 2022-01-01 PROCEDURE — 71046 X-RAY EXAM CHEST 2 VIEWS: CPT

## 2022-01-01 PROCEDURE — U0005 INFEC AGEN DETEC AMPLI PROBE: HCPCS | Performed by: HOSPITALIST

## 2022-01-01 PROCEDURE — 87186 SC STD MICRODIL/AGAR DIL: CPT | Performed by: NURSE PRACTITIONER

## 2022-01-01 PROCEDURE — 36415 COLL VENOUS BLD VENIPUNCTURE: CPT | Performed by: NURSE PRACTITIONER

## 2022-01-01 PROCEDURE — 80053 COMPREHEN METABOLIC PANEL: CPT | Performed by: NURSE PRACTITIONER

## 2022-01-01 PROCEDURE — 82803 BLOOD GASES ANY COMBINATION: CPT

## 2022-01-01 PROCEDURE — 25010000002 METHYLPREDNISOLONE PER 40 MG: Performed by: INTERNAL MEDICINE

## 2022-01-01 PROCEDURE — 99285 EMERGENCY DEPT VISIT HI MDM: CPT

## 2022-01-01 PROCEDURE — 84484 ASSAY OF TROPONIN QUANT: CPT | Performed by: EMERGENCY MEDICINE

## 2022-01-01 PROCEDURE — 85025 COMPLETE CBC W/AUTO DIFF WBC: CPT | Performed by: NURSE PRACTITIONER

## 2022-01-01 PROCEDURE — 80053 COMPREHEN METABOLIC PANEL: CPT | Performed by: EMERGENCY MEDICINE

## 2022-01-01 PROCEDURE — 85025 COMPLETE CBC W/AUTO DIFF WBC: CPT | Performed by: PHYSICIAN ASSISTANT

## 2022-01-01 PROCEDURE — 99284 EMERGENCY DEPT VISIT MOD MDM: CPT

## 2022-01-01 PROCEDURE — 85027 COMPLETE CBC AUTOMATED: CPT | Performed by: NURSE PRACTITIONER

## 2022-01-01 PROCEDURE — 25010000002 CEFTRIAXONE PER 250 MG: Performed by: HOSPITALIST

## 2022-01-01 PROCEDURE — U0003 INFECTIOUS AGENT DETECTION BY NUCLEIC ACID (DNA OR RNA); SEVERE ACUTE RESPIRATORY SYNDROME CORONAVIRUS 2 (SARS-COV-2) (CORONAVIRUS DISEASE [COVID-19]), AMPLIFIED PROBE TECHNIQUE, MAKING USE OF HIGH THROUGHPUT TECHNOLOGIES AS DESCRIBED BY CMS-2020-01-R: HCPCS | Performed by: INTERNAL MEDICINE

## 2022-01-01 PROCEDURE — 99221 1ST HOSP IP/OBS SF/LOW 40: CPT | Performed by: SURGERY

## 2022-01-01 PROCEDURE — 85025 COMPLETE CBC W/AUTO DIFF WBC: CPT | Performed by: EMERGENCY MEDICINE

## 2022-01-01 PROCEDURE — 71045 X-RAY EXAM CHEST 1 VIEW: CPT

## 2022-01-01 PROCEDURE — 97530 THERAPEUTIC ACTIVITIES: CPT

## 2022-01-01 PROCEDURE — 80048 BASIC METABOLIC PNL TOTAL CA: CPT | Performed by: NURSE PRACTITIONER

## 2022-01-01 PROCEDURE — 63710000001 PREDNISONE PER 5 MG: Performed by: INTERNAL MEDICINE

## 2022-01-01 PROCEDURE — 84443 ASSAY THYROID STIM HORMONE: CPT | Performed by: NURSE PRACTITIONER

## 2022-01-01 PROCEDURE — 94761 N-INVAS EAR/PLS OXIMETRY MLT: CPT

## 2022-01-01 PROCEDURE — 25010000002 SODIUM CHLORIDE 0.9 % WITH KCL 20 MEQ 20-0.9 MEQ/L-% SOLUTION: Performed by: INTERNAL MEDICINE

## 2022-01-01 PROCEDURE — 94640 AIRWAY INHALATION TREATMENT: CPT

## 2022-01-01 PROCEDURE — 80053 COMPREHEN METABOLIC PANEL: CPT | Performed by: PHYSICIAN ASSISTANT

## 2022-01-01 PROCEDURE — 83690 ASSAY OF LIPASE: CPT | Performed by: EMERGENCY MEDICINE

## 2022-01-01 PROCEDURE — 85025 COMPLETE CBC W/AUTO DIFF WBC: CPT | Performed by: INTERNAL MEDICINE

## 2022-01-01 PROCEDURE — U0004 COV-19 TEST NON-CDC HGH THRU: HCPCS | Performed by: EMERGENCY MEDICINE

## 2022-01-01 PROCEDURE — 0 POTASSIUM CHLORIDE 10 MEQ/100ML SOLUTION: Performed by: INTERNAL MEDICINE

## 2022-01-01 PROCEDURE — 84439 ASSAY OF FREE THYROXINE: CPT | Performed by: NURSE PRACTITIONER

## 2022-01-01 PROCEDURE — 85027 COMPLETE CBC AUTOMATED: CPT | Performed by: INTERNAL MEDICINE

## 2022-01-01 PROCEDURE — 93010 ELECTROCARDIOGRAM REPORT: CPT | Performed by: INTERNAL MEDICINE

## 2022-01-01 PROCEDURE — 87086 URINE CULTURE/COLONY COUNT: CPT | Performed by: NURSE PRACTITIONER

## 2022-01-01 PROCEDURE — 83880 ASSAY OF NATRIURETIC PEPTIDE: CPT | Performed by: EMERGENCY MEDICINE

## 2022-01-01 PROCEDURE — 25010000002 METHYLPREDNISOLONE PER 125 MG: Performed by: PHYSICIAN ASSISTANT

## 2022-01-01 PROCEDURE — 97162 PT EVAL MOD COMPLEX 30 MIN: CPT

## 2022-01-01 PROCEDURE — 93005 ELECTROCARDIOGRAM TRACING: CPT | Performed by: EMERGENCY MEDICINE

## 2022-01-01 PROCEDURE — 93306 TTE W/DOPPLER COMPLETE: CPT

## 2022-01-01 PROCEDURE — 99215 OFFICE O/P EST HI 40 MIN: CPT | Performed by: NURSE PRACTITIONER

## 2022-01-01 PROCEDURE — 93306 TTE W/DOPPLER COMPLETE: CPT | Performed by: INTERNAL MEDICINE

## 2022-01-01 PROCEDURE — 83735 ASSAY OF MAGNESIUM: CPT | Performed by: EMERGENCY MEDICINE

## 2022-01-01 PROCEDURE — 36600 WITHDRAWAL OF ARTERIAL BLOOD: CPT

## 2022-01-01 PROCEDURE — 80048 BASIC METABOLIC PNL TOTAL CA: CPT | Performed by: INTERNAL MEDICINE

## 2022-01-01 PROCEDURE — 81001 URINALYSIS AUTO W/SCOPE: CPT | Performed by: NURSE PRACTITIONER

## 2022-01-01 PROCEDURE — 97162 PT EVAL MOD COMPLEX 30 MIN: CPT | Performed by: PHYSICAL THERAPIST

## 2022-01-01 PROCEDURE — 84484 ASSAY OF TROPONIN QUANT: CPT | Performed by: PHYSICIAN ASSISTANT

## 2022-01-01 PROCEDURE — 96374 THER/PROPH/DIAG INJ IV PUSH: CPT

## 2022-01-01 PROCEDURE — U0003 INFECTIOUS AGENT DETECTION BY NUCLEIC ACID (DNA OR RNA); SEVERE ACUTE RESPIRATORY SYNDROME CORONAVIRUS 2 (SARS-COV-2) (CORONAVIRUS DISEASE [COVID-19]), AMPLIFIED PROBE TECHNIQUE, MAKING USE OF HIGH THROUGHPUT TECHNOLOGIES AS DESCRIBED BY CMS-2020-01-R: HCPCS | Performed by: HOSPITALIST

## 2022-01-01 RX ORDER — ALBUTEROL SULFATE 2.5 MG/3ML
2.5 SOLUTION RESPIRATORY (INHALATION) EVERY 4 HOURS PRN
Status: DISCONTINUED | OUTPATIENT
Start: 2022-01-01 | End: 2022-01-01 | Stop reason: HOSPADM

## 2022-01-01 RX ORDER — SODIUM CHLORIDE 0.9 % (FLUSH) 0.9 %
10 SYRINGE (ML) INJECTION AS NEEDED
Status: DISCONTINUED | OUTPATIENT
Start: 2022-01-01 | End: 2022-01-01 | Stop reason: HOSPADM

## 2022-01-01 RX ORDER — LACTULOSE 10 G/15ML
10 SOLUTION ORAL 2 TIMES DAILY
Status: COMPLETED | OUTPATIENT
Start: 2022-01-01 | End: 2022-01-01

## 2022-01-01 RX ORDER — MONTELUKAST SODIUM 10 MG/1
10 TABLET ORAL EVERY EVENING
Status: DISCONTINUED | OUTPATIENT
Start: 2022-01-01 | End: 2022-01-01 | Stop reason: HOSPADM

## 2022-01-01 RX ORDER — ACETAMINOPHEN 325 MG/1
650 TABLET ORAL EVERY 4 HOURS PRN
Status: DISCONTINUED | OUTPATIENT
Start: 2022-01-01 | End: 2022-01-01 | Stop reason: HOSPADM

## 2022-01-01 RX ORDER — LORAZEPAM 2 MG/ML
0.25 INJECTION INTRAMUSCULAR ONCE
Status: DISCONTINUED | OUTPATIENT
Start: 2022-01-01 | End: 2022-01-01

## 2022-01-01 RX ORDER — FLUTICASONE PROPIONATE 50 MCG
2 SPRAY, SUSPENSION (ML) NASAL DAILY
Status: DISCONTINUED | OUTPATIENT
Start: 2022-01-01 | End: 2022-01-01 | Stop reason: HOSPADM

## 2022-01-01 RX ORDER — POTASSIUM CHLORIDE 750 MG/1
40 TABLET, FILM COATED, EXTENDED RELEASE ORAL AS NEEDED
Status: DISCONTINUED | OUTPATIENT
Start: 2022-01-01 | End: 2022-01-01 | Stop reason: HOSPADM

## 2022-01-01 RX ORDER — BENZONATATE 100 MG/1
200 CAPSULE ORAL 3 TIMES DAILY PRN
Status: DISCONTINUED | OUTPATIENT
Start: 2022-01-01 | End: 2022-01-01 | Stop reason: HOSPADM

## 2022-01-01 RX ORDER — IPRATROPIUM BROMIDE AND ALBUTEROL SULFATE 2.5; .5 MG/3ML; MG/3ML
3 SOLUTION RESPIRATORY (INHALATION)
Status: DISCONTINUED | OUTPATIENT
Start: 2022-01-01 | End: 2022-01-01 | Stop reason: HOSPADM

## 2022-01-01 RX ORDER — POTASSIUM CHLORIDE 1.5 G/1.77G
40 POWDER, FOR SOLUTION ORAL AS NEEDED
Status: DISCONTINUED | OUTPATIENT
Start: 2022-01-01 | End: 2022-01-01 | Stop reason: HOSPADM

## 2022-01-01 RX ORDER — LORAZEPAM 0.5 MG/1
0.5 TABLET ORAL 2 TIMES DAILY PRN
Status: DISCONTINUED | OUTPATIENT
Start: 2022-01-01 | End: 2022-01-01 | Stop reason: HOSPADM

## 2022-01-01 RX ORDER — UREA 10 %
3 LOTION (ML) TOPICAL NIGHTLY PRN
Status: DISCONTINUED | OUTPATIENT
Start: 2022-01-01 | End: 2022-01-01 | Stop reason: HOSPADM

## 2022-01-01 RX ORDER — ATORVASTATIN CALCIUM 20 MG/1
10 TABLET, FILM COATED ORAL DAILY
Status: DISCONTINUED | OUTPATIENT
Start: 2022-01-01 | End: 2022-01-01 | Stop reason: HOSPADM

## 2022-01-01 RX ORDER — SODIUM CHLORIDE 0.9 % (FLUSH) 0.9 %
10 SYRINGE (ML) INJECTION EVERY 12 HOURS SCHEDULED
Status: DISCONTINUED | OUTPATIENT
Start: 2022-01-01 | End: 2022-01-01 | Stop reason: HOSPADM

## 2022-01-01 RX ORDER — LACTULOSE 10 G/15ML
20 SOLUTION ORAL 3 TIMES DAILY
Status: DISCONTINUED | OUTPATIENT
Start: 2022-01-01 | End: 2022-01-01

## 2022-01-01 RX ORDER — PREDNISONE 20 MG/1
20 TABLET ORAL 2 TIMES DAILY
Qty: 10 TABLET | Refills: 0 | Status: SHIPPED | OUTPATIENT
Start: 2022-01-01 | End: 2022-01-01 | Stop reason: HOSPADM

## 2022-01-01 RX ORDER — ASPIRIN 81 MG/1
81 TABLET ORAL DAILY
Status: DISCONTINUED | OUTPATIENT
Start: 2022-01-01 | End: 2022-01-01 | Stop reason: HOSPADM

## 2022-01-01 RX ORDER — IPRATROPIUM BROMIDE AND ALBUTEROL SULFATE 2.5; .5 MG/3ML; MG/3ML
6 SOLUTION RESPIRATORY (INHALATION) ONCE
Status: COMPLETED | OUTPATIENT
Start: 2022-01-01 | End: 2022-01-01

## 2022-01-01 RX ORDER — BISACODYL 10 MG
10 SUPPOSITORY, RECTAL RECTAL ONCE
Status: COMPLETED | OUTPATIENT
Start: 2022-01-01 | End: 2022-01-01

## 2022-01-01 RX ORDER — BENZONATATE 200 MG/1
200 CAPSULE ORAL 3 TIMES DAILY PRN
Qty: 15 CAPSULE | Refills: 0 | Status: SHIPPED | OUTPATIENT
Start: 2022-01-01

## 2022-01-01 RX ORDER — NITROGLYCERIN 0.4 MG/1
0.4 TABLET SUBLINGUAL
Status: DISCONTINUED | OUTPATIENT
Start: 2022-01-01 | End: 2022-01-01 | Stop reason: HOSPADM

## 2022-01-01 RX ORDER — SODIUM CHLORIDE 0.9 % (FLUSH) 0.9 %
20 SYRINGE (ML) INJECTION AS NEEDED
Status: DISCONTINUED | OUTPATIENT
Start: 2022-01-01 | End: 2022-01-01 | Stop reason: HOSPADM

## 2022-01-01 RX ORDER — MINERAL OIL 100 G/100G
1 OIL RECTAL ONCE
Status: DISCONTINUED | OUTPATIENT
Start: 2022-01-01 | End: 2022-01-01

## 2022-01-01 RX ORDER — POLYETHYLENE GLYCOL 3350 17 G/17G
17 POWDER, FOR SOLUTION ORAL DAILY
Status: DISCONTINUED | OUTPATIENT
Start: 2022-01-01 | End: 2022-01-01 | Stop reason: HOSPADM

## 2022-01-01 RX ORDER — ATORVASTATIN CALCIUM 10 MG/1
10 TABLET, FILM COATED ORAL DAILY
Qty: 90 TABLET | Refills: 3 | Status: SHIPPED | OUTPATIENT
Start: 2022-01-01

## 2022-01-01 RX ORDER — SODIUM CHLORIDE AND POTASSIUM CHLORIDE 150; 900 MG/100ML; MG/100ML
100 INJECTION, SOLUTION INTRAVENOUS CONTINUOUS
Status: DISCONTINUED | OUTPATIENT
Start: 2022-01-01 | End: 2022-01-01 | Stop reason: HOSPADM

## 2022-01-01 RX ORDER — ONDANSETRON 2 MG/ML
4 INJECTION INTRAMUSCULAR; INTRAVENOUS EVERY 6 HOURS PRN
Status: DISCONTINUED | OUTPATIENT
Start: 2022-01-01 | End: 2022-01-01 | Stop reason: HOSPADM

## 2022-01-01 RX ORDER — AMLODIPINE BESYLATE 5 MG/1
5 TABLET ORAL DAILY
Status: DISCONTINUED | OUTPATIENT
Start: 2022-01-01 | End: 2022-01-01 | Stop reason: HOSPADM

## 2022-01-01 RX ORDER — DOCUSATE SODIUM 100 MG/1
100 CAPSULE, LIQUID FILLED ORAL 2 TIMES DAILY
Status: DISCONTINUED | OUTPATIENT
Start: 2022-01-01 | End: 2022-01-01 | Stop reason: HOSPADM

## 2022-01-01 RX ORDER — LACTULOSE 10 G/15ML
20 SOLUTION ORAL 2 TIMES DAILY
Status: DISCONTINUED | OUTPATIENT
Start: 2022-01-01 | End: 2022-01-01

## 2022-01-01 RX ORDER — LORAZEPAM 0.5 MG/1
0.25 TABLET ORAL ONCE
Status: COMPLETED | OUTPATIENT
Start: 2022-01-01 | End: 2022-01-01

## 2022-01-01 RX ORDER — ONDANSETRON 4 MG/1
4 TABLET, FILM COATED ORAL EVERY 6 HOURS PRN
Status: DISCONTINUED | OUTPATIENT
Start: 2022-01-01 | End: 2022-01-01 | Stop reason: HOSPADM

## 2022-01-01 RX ORDER — POLYETHYLENE GLYCOL 3350 17 G/17G
17 POWDER, FOR SOLUTION ORAL DAILY
Start: 2022-01-01

## 2022-01-01 RX ORDER — POLYETHYLENE GLYCOL 3350 17 G/17G
17 POWDER, FOR SOLUTION ORAL DAILY
Status: DISCONTINUED | OUTPATIENT
Start: 2022-01-01 | End: 2022-01-01

## 2022-01-01 RX ORDER — SODIUM CHLORIDE AND POTASSIUM CHLORIDE 150; 900 MG/100ML; MG/100ML
100 INJECTION, SOLUTION INTRAVENOUS CONTINUOUS
Status: DISCONTINUED | OUTPATIENT
Start: 2022-01-01 | End: 2022-01-01 | Stop reason: SDUPTHER

## 2022-01-01 RX ORDER — PREDNISONE 20 MG/1
40 TABLET ORAL
Status: DISCONTINUED | OUTPATIENT
Start: 2022-01-01 | End: 2022-01-01

## 2022-01-01 RX ORDER — METHYLPREDNISOLONE SODIUM SUCCINATE 125 MG/2ML
125 INJECTION, POWDER, LYOPHILIZED, FOR SOLUTION INTRAMUSCULAR; INTRAVENOUS ONCE
Status: COMPLETED | OUTPATIENT
Start: 2022-01-01 | End: 2022-01-01

## 2022-01-01 RX ORDER — ALBUTEROL SULFATE 2.5 MG/3ML
2.5 SOLUTION RESPIRATORY (INHALATION) EVERY 4 HOURS PRN
Qty: 360 ML | Refills: 12 | Status: SHIPPED | OUTPATIENT
Start: 2022-01-01

## 2022-01-01 RX ORDER — PREDNISONE 10 MG/1
10 TABLET ORAL TAKE AS DIRECTED
Qty: 30 TABLET | Refills: 0 | Status: SHIPPED | OUTPATIENT
Start: 2022-01-01 | End: 2022-01-01 | Stop reason: HOSPADM

## 2022-01-01 RX ORDER — METHYLPREDNISOLONE SODIUM SUCCINATE 40 MG/ML
40 INJECTION, POWDER, LYOPHILIZED, FOR SOLUTION INTRAMUSCULAR; INTRAVENOUS EVERY 12 HOURS
Status: DISCONTINUED | OUTPATIENT
Start: 2022-01-01 | End: 2022-01-01

## 2022-01-01 RX ORDER — SODIUM CHLORIDE 9 MG/ML
100 INJECTION, SOLUTION INTRAVENOUS CONTINUOUS
Status: DISCONTINUED | OUTPATIENT
Start: 2022-01-01 | End: 2022-01-01

## 2022-01-01 RX ORDER — ACETAMINOPHEN 650 MG/1
650 SUPPOSITORY RECTAL EVERY 4 HOURS PRN
Status: DISCONTINUED | OUTPATIENT
Start: 2022-01-01 | End: 2022-01-01 | Stop reason: HOSPADM

## 2022-01-01 RX ORDER — POTASSIUM CHLORIDE 7.45 MG/ML
10 INJECTION INTRAVENOUS
Status: DISCONTINUED | OUTPATIENT
Start: 2022-01-01 | End: 2022-01-01 | Stop reason: HOSPADM

## 2022-01-01 RX ORDER — CETIRIZINE HYDROCHLORIDE 10 MG/1
10 TABLET ORAL DAILY
Status: DISCONTINUED | OUTPATIENT
Start: 2022-01-01 | End: 2022-01-01 | Stop reason: HOSPADM

## 2022-01-01 RX ORDER — CEFDINIR 300 MG/1
300 CAPSULE ORAL 2 TIMES DAILY
Qty: 6 CAPSULE | Refills: 0
Start: 2022-01-01 | End: 2022-01-01

## 2022-01-01 RX ORDER — ALBUTEROL SULFATE 90 UG/1
AEROSOL, METERED RESPIRATORY (INHALATION)
Qty: 18 G | Refills: 5 | Status: SHIPPED | OUTPATIENT
Start: 2022-01-01

## 2022-01-01 RX ORDER — IPRATROPIUM BROMIDE AND ALBUTEROL SULFATE 2.5; .5 MG/3ML; MG/3ML
3 SOLUTION RESPIRATORY (INHALATION) ONCE
Status: COMPLETED | OUTPATIENT
Start: 2022-01-01 | End: 2022-01-01

## 2022-01-01 RX ORDER — ACETAMINOPHEN 160 MG/5ML
650 SOLUTION ORAL EVERY 4 HOURS PRN
Status: DISCONTINUED | OUTPATIENT
Start: 2022-01-01 | End: 2022-01-01 | Stop reason: HOSPADM

## 2022-01-01 RX ORDER — ENOXAPARIN SODIUM 100 MG/ML
30 INJECTION SUBCUTANEOUS EVERY 24 HOURS
Status: DISCONTINUED | OUTPATIENT
Start: 2022-01-01 | End: 2022-01-01 | Stop reason: HOSPADM

## 2022-01-01 RX ORDER — POTASSIUM CHLORIDE 29.8 MG/ML
20 INJECTION INTRAVENOUS
Status: DISCONTINUED | OUTPATIENT
Start: 2022-01-01 | End: 2022-01-01 | Stop reason: HOSPADM

## 2022-01-01 RX ORDER — ALBUTEROL SULFATE 90 UG/1
2 AEROSOL, METERED RESPIRATORY (INHALATION) EVERY 4 HOURS PRN
Qty: 6.7 G | Refills: 0 | Status: SHIPPED | OUTPATIENT
Start: 2022-01-01

## 2022-01-01 RX ADMIN — IPRATROPIUM BROMIDE 0.5 MG: 0.5 SOLUTION RESPIRATORY (INHALATION) at 16:10

## 2022-01-01 RX ADMIN — FLUTICASONE PROPIONATE 2 SPRAY: 50 SPRAY, METERED NASAL at 08:13

## 2022-01-01 RX ADMIN — METHYLPREDNISOLONE SODIUM SUCCINATE 40 MG: 40 INJECTION, POWDER, FOR SOLUTION INTRAMUSCULAR; INTRAVENOUS at 18:35

## 2022-01-01 RX ADMIN — IPRATROPIUM BROMIDE AND ALBUTEROL SULFATE 3 ML: 2.5; .5 SOLUTION RESPIRATORY (INHALATION) at 11:12

## 2022-01-01 RX ADMIN — IPRATROPIUM BROMIDE AND ALBUTEROL SULFATE 3 ML: 2.5; .5 SOLUTION RESPIRATORY (INHALATION) at 08:34

## 2022-01-01 RX ADMIN — AMLODIPINE BESYLATE 5 MG: 5 TABLET ORAL at 08:16

## 2022-01-01 RX ADMIN — POTASSIUM CHLORIDE 10 MEQ: 7.46 INJECTION, SOLUTION INTRAVENOUS at 03:44

## 2022-01-01 RX ADMIN — AMLODIPINE BESYLATE 5 MG: 5 TABLET ORAL at 08:53

## 2022-01-01 RX ADMIN — ATORVASTATIN CALCIUM 10 MG: 20 TABLET, FILM COATED ORAL at 20:15

## 2022-01-01 RX ADMIN — POTASSIUM CHLORIDE AND SODIUM CHLORIDE 100 ML/HR: 900; 150 INJECTION, SOLUTION INTRAVENOUS at 09:24

## 2022-01-01 RX ADMIN — FLUTICASONE PROPIONATE 2 SPRAY: 50 SPRAY, METERED NASAL at 09:30

## 2022-01-01 RX ADMIN — IPRATROPIUM BROMIDE AND ALBUTEROL SULFATE 3 ML: 2.5; .5 SOLUTION RESPIRATORY (INHALATION) at 11:06

## 2022-01-01 RX ADMIN — LORAZEPAM 0.5 MG: 0.5 TABLET ORAL at 17:53

## 2022-01-01 RX ADMIN — Medication 10 ML: at 00:29

## 2022-01-01 RX ADMIN — AMLODIPINE BESYLATE 5 MG: 5 TABLET ORAL at 08:59

## 2022-01-01 RX ADMIN — CETIRIZINE HYDROCHLORIDE 10 MG: 10 TABLET ORAL at 08:59

## 2022-01-01 RX ADMIN — IPRATROPIUM BROMIDE AND ALBUTEROL SULFATE 3 ML: 2.5; .5 SOLUTION RESPIRATORY (INHALATION) at 19:35

## 2022-01-01 RX ADMIN — POLYETHYLENE GLYCOL 3350 17 G: 17 POWDER, FOR SOLUTION ORAL at 08:13

## 2022-01-01 RX ADMIN — DOCUSATE SODIUM 100 MG: 100 CAPSULE, LIQUID FILLED ORAL at 08:34

## 2022-01-01 RX ADMIN — FLUTICASONE PROPIONATE 2 SPRAY: 50 SPRAY, METERED NASAL at 09:26

## 2022-01-01 RX ADMIN — ASPIRIN 81 MG: 81 TABLET, COATED ORAL at 08:52

## 2022-01-01 RX ADMIN — ATORVASTATIN CALCIUM 10 MG: 20 TABLET, FILM COATED ORAL at 08:52

## 2022-01-01 RX ADMIN — POTASSIUM CHLORIDE 10 MEQ: 7.46 INJECTION, SOLUTION INTRAVENOUS at 05:01

## 2022-01-01 RX ADMIN — MAGNESIUM HYDROXIDE 10 ML: 2400 SUSPENSION ORAL at 20:16

## 2022-01-01 RX ADMIN — IPRATROPIUM BROMIDE AND ALBUTEROL SULFATE 3 ML: 2.5; .5 SOLUTION RESPIRATORY (INHALATION) at 16:01

## 2022-01-01 RX ADMIN — MAGNESIUM HYDROXIDE 10 ML: 2400 SUSPENSION ORAL at 20:27

## 2022-01-01 RX ADMIN — ASPIRIN 81 MG: 81 TABLET, COATED ORAL at 08:59

## 2022-01-01 RX ADMIN — DOCUSATE SODIUM 100 MG: 100 CAPSULE, LIQUID FILLED ORAL at 20:27

## 2022-01-01 RX ADMIN — DOCUSATE SODIUM 100 MG: 100 CAPSULE, LIQUID FILLED ORAL at 20:37

## 2022-01-01 RX ADMIN — CEFTRIAXONE 1 G: 1 INJECTION, POWDER, FOR SOLUTION INTRAMUSCULAR; INTRAVENOUS at 08:13

## 2022-01-01 RX ADMIN — CETIRIZINE HYDROCHLORIDE 10 MG: 10 TABLET ORAL at 08:33

## 2022-01-01 RX ADMIN — Medication 10 ML: at 08:18

## 2022-01-01 RX ADMIN — MAGNESIUM HYDROXIDE 30 ML: 2400 SUSPENSION ORAL at 20:15

## 2022-01-01 RX ADMIN — POTASSIUM CHLORIDE AND SODIUM CHLORIDE 100 ML/HR: 900; 150 INJECTION, SOLUTION INTRAVENOUS at 22:23

## 2022-01-01 RX ADMIN — MONTELUKAST SODIUM 10 MG: 10 TABLET, FILM COATED ORAL at 17:07

## 2022-01-01 RX ADMIN — MONTELUKAST SODIUM 10 MG: 10 TABLET, FILM COATED ORAL at 17:40

## 2022-01-01 RX ADMIN — AMLODIPINE BESYLATE 5 MG: 5 TABLET ORAL at 08:33

## 2022-01-01 RX ADMIN — IPRATROPIUM BROMIDE 0.5 MG: 0.5 SOLUTION RESPIRATORY (INHALATION) at 15:02

## 2022-01-01 RX ADMIN — DOCUSATE SODIUM 100 MG: 100 CAPSULE, LIQUID FILLED ORAL at 09:30

## 2022-01-01 RX ADMIN — POTASSIUM CHLORIDE AND SODIUM CHLORIDE 100 ML/HR: 900; 150 INJECTION, SOLUTION INTRAVENOUS at 00:52

## 2022-01-01 RX ADMIN — AMLODIPINE BESYLATE 5 MG: 5 TABLET ORAL at 08:13

## 2022-01-01 RX ADMIN — BISACODYL 10 MG: 10 SUPPOSITORY RECTAL at 02:07

## 2022-01-01 RX ADMIN — ASPIRIN 81 MG: 81 TABLET, COATED ORAL at 08:43

## 2022-01-01 RX ADMIN — PREDNISONE 40 MG: 20 TABLET ORAL at 08:52

## 2022-01-01 RX ADMIN — IPRATROPIUM BROMIDE AND ALBUTEROL SULFATE 3 ML: 2.5; .5 SOLUTION RESPIRATORY (INHALATION) at 15:21

## 2022-01-01 RX ADMIN — MONTELUKAST SODIUM 10 MG: 10 TABLET, FILM COATED ORAL at 17:22

## 2022-01-01 RX ADMIN — POTASSIUM CHLORIDE 10 MEQ: 7.46 INJECTION, SOLUTION INTRAVENOUS at 02:39

## 2022-01-01 RX ADMIN — IPRATROPIUM BROMIDE AND ALBUTEROL SULFATE 3 ML: 2.5; .5 SOLUTION RESPIRATORY (INHALATION) at 19:22

## 2022-01-01 RX ADMIN — ATORVASTATIN CALCIUM 10 MG: 20 TABLET, FILM COATED ORAL at 08:13

## 2022-01-01 RX ADMIN — ASPIRIN 81 MG: 81 TABLET, COATED ORAL at 08:13

## 2022-01-01 RX ADMIN — MAGNESIUM HYDROXIDE 10 ML: 2400 SUSPENSION ORAL at 23:13

## 2022-01-01 RX ADMIN — ENOXAPARIN SODIUM 30 MG: 30 INJECTION SUBCUTANEOUS at 09:16

## 2022-01-01 RX ADMIN — IPRATROPIUM BROMIDE AND ALBUTEROL SULFATE 3 ML: 2.5; .5 SOLUTION RESPIRATORY (INHALATION) at 21:18

## 2022-01-01 RX ADMIN — IPRATROPIUM BROMIDE AND ALBUTEROL SULFATE 3 ML: 2.5; .5 SOLUTION RESPIRATORY (INHALATION) at 15:44

## 2022-01-01 RX ADMIN — ATORVASTATIN CALCIUM 10 MG: 20 TABLET, FILM COATED ORAL at 08:59

## 2022-01-01 RX ADMIN — IPRATROPIUM BROMIDE AND ALBUTEROL SULFATE 6 ML: .5; 3 SOLUTION RESPIRATORY (INHALATION) at 14:34

## 2022-01-01 RX ADMIN — LACTULOSE 20 G: 20 SOLUTION ORAL at 22:20

## 2022-01-01 RX ADMIN — Medication 10 ML: at 22:24

## 2022-01-01 RX ADMIN — METHYLPREDNISOLONE SODIUM SUCCINATE 125 MG: 125 INJECTION, POWDER, FOR SOLUTION INTRAMUSCULAR; INTRAVENOUS at 15:11

## 2022-01-01 RX ADMIN — IPRATROPIUM BROMIDE 0.5 MG: 0.5 SOLUTION RESPIRATORY (INHALATION) at 19:40

## 2022-01-01 RX ADMIN — LACTULOSE 10 G: 10 SOLUTION ORAL at 20:15

## 2022-01-01 RX ADMIN — MONTELUKAST SODIUM 10 MG: 10 TABLET, FILM COATED ORAL at 20:18

## 2022-01-01 RX ADMIN — LORAZEPAM 0.25 MG: 0.5 TABLET ORAL at 17:21

## 2022-01-01 RX ADMIN — DOCUSATE SODIUM 100 MG: 100 CAPSULE, LIQUID FILLED ORAL at 20:17

## 2022-01-01 RX ADMIN — DOCUSATE SODIUM 100 MG: 100 CAPSULE, LIQUID FILLED ORAL at 08:52

## 2022-01-01 RX ADMIN — IPRATROPIUM BROMIDE AND ALBUTEROL SULFATE 3 ML: 2.5; .5 SOLUTION RESPIRATORY (INHALATION) at 08:05

## 2022-01-01 RX ADMIN — Medication 3 MG: at 20:37

## 2022-01-01 RX ADMIN — FLUTICASONE PROPIONATE 2 SPRAY: 50 SPRAY, METERED NASAL at 08:17

## 2022-01-01 RX ADMIN — ATORVASTATIN CALCIUM 10 MG: 20 TABLET, FILM COATED ORAL at 08:16

## 2022-01-01 RX ADMIN — DOCUSATE SODIUM 100 MG: 100 CAPSULE, LIQUID FILLED ORAL at 20:15

## 2022-01-01 RX ADMIN — FLUTICASONE PROPIONATE 2 SPRAY: 50 SPRAY, METERED NASAL at 08:53

## 2022-01-01 RX ADMIN — Medication 10 ML: at 08:17

## 2022-01-01 RX ADMIN — LACTULOSE 20 G: 20 SOLUTION ORAL at 20:37

## 2022-01-01 RX ADMIN — LACTULOSE 20 G: 20 SOLUTION ORAL at 21:34

## 2022-01-01 RX ADMIN — LACTULOSE 20 G: 20 SOLUTION ORAL at 20:27

## 2022-01-01 RX ADMIN — IPRATROPIUM BROMIDE AND ALBUTEROL SULFATE 3 ML: 2.5; .5 SOLUTION RESPIRATORY (INHALATION) at 12:03

## 2022-01-01 RX ADMIN — AMLODIPINE BESYLATE 5 MG: 5 TABLET ORAL at 09:30

## 2022-01-01 RX ADMIN — IPRATROPIUM BROMIDE AND ALBUTEROL SULFATE 3 ML: 2.5; .5 SOLUTION RESPIRATORY (INHALATION) at 08:02

## 2022-01-01 RX ADMIN — CETIRIZINE HYDROCHLORIDE 10 MG: 10 TABLET ORAL at 08:52

## 2022-01-01 RX ADMIN — PREDNISONE 40 MG: 20 TABLET ORAL at 12:38

## 2022-01-01 RX ADMIN — METHYLPREDNISOLONE SODIUM SUCCINATE 40 MG: 40 INJECTION, POWDER, FOR SOLUTION INTRAMUSCULAR; INTRAVENOUS at 09:15

## 2022-01-01 RX ADMIN — MONTELUKAST SODIUM 10 MG: 10 TABLET, FILM COATED ORAL at 22:20

## 2022-01-01 RX ADMIN — MONTELUKAST SODIUM 10 MG: 10 TABLET, FILM COATED ORAL at 18:35

## 2022-01-01 RX ADMIN — LACTULOSE 20 G: 20 SOLUTION ORAL at 08:52

## 2022-01-01 RX ADMIN — ENOXAPARIN SODIUM 30 MG: 30 INJECTION SUBCUTANEOUS at 08:52

## 2022-01-01 RX ADMIN — IPRATROPIUM BROMIDE AND ALBUTEROL SULFATE 3 ML: 2.5; .5 SOLUTION RESPIRATORY (INHALATION) at 07:16

## 2022-01-01 RX ADMIN — IPRATROPIUM BROMIDE AND ALBUTEROL SULFATE 3 ML: 2.5; .5 SOLUTION RESPIRATORY (INHALATION) at 16:30

## 2022-01-01 RX ADMIN — IPRATROPIUM BROMIDE 0.5 MG: 0.5 SOLUTION RESPIRATORY (INHALATION) at 08:25

## 2022-01-01 RX ADMIN — CETIRIZINE HYDROCHLORIDE 10 MG: 10 TABLET ORAL at 09:30

## 2022-01-01 RX ADMIN — ATORVASTATIN CALCIUM 10 MG: 20 TABLET, FILM COATED ORAL at 08:43

## 2022-01-01 RX ADMIN — Medication 3 MG: at 20:17

## 2022-01-01 RX ADMIN — IPRATROPIUM BROMIDE AND ALBUTEROL SULFATE 3 ML: 2.5; .5 SOLUTION RESPIRATORY (INHALATION) at 13:53

## 2022-01-01 RX ADMIN — CETIRIZINE HYDROCHLORIDE 10 MG: 10 TABLET ORAL at 09:16

## 2022-01-01 RX ADMIN — METHYLPREDNISOLONE SODIUM SUCCINATE 40 MG: 40 INJECTION, POWDER, FOR SOLUTION INTRAMUSCULAR; INTRAVENOUS at 06:38

## 2022-01-01 RX ADMIN — MONTELUKAST SODIUM 10 MG: 10 TABLET, FILM COATED ORAL at 16:40

## 2022-01-01 RX ADMIN — IPRATROPIUM BROMIDE AND ALBUTEROL SULFATE 3 ML: 2.5; .5 SOLUTION RESPIRATORY (INHALATION) at 07:09

## 2022-01-01 RX ADMIN — POTASSIUM CHLORIDE 10 MEQ: 7.46 INJECTION, SOLUTION INTRAVENOUS at 23:30

## 2022-01-01 RX ADMIN — DOCUSATE SODIUM 100 MG: 100 CAPSULE, LIQUID FILLED ORAL at 08:44

## 2022-01-01 RX ADMIN — AMLODIPINE BESYLATE 5 MG: 5 TABLET ORAL at 09:16

## 2022-01-01 RX ADMIN — MAGNESIUM HYDROXIDE 10 ML: 2400 SUSPENSION ORAL at 20:17

## 2022-01-01 RX ADMIN — FLUTICASONE PROPIONATE 2 SPRAY: 50 SPRAY, METERED NASAL at 08:59

## 2022-01-01 RX ADMIN — IPRATROPIUM BROMIDE 0.5 MG: 0.5 SOLUTION RESPIRATORY (INHALATION) at 07:09

## 2022-01-01 RX ADMIN — DOCUSATE SODIUM 100 MG: 100 CAPSULE, LIQUID FILLED ORAL at 08:59

## 2022-01-01 RX ADMIN — ASPIRIN 81 MG: 81 TABLET, COATED ORAL at 08:17

## 2022-01-01 RX ADMIN — IPRATROPIUM BROMIDE 0.5 MG: 0.5 SOLUTION RESPIRATORY (INHALATION) at 11:12

## 2022-01-01 RX ADMIN — FLUTICASONE PROPIONATE 2 SPRAY: 50 SPRAY, METERED NASAL at 08:34

## 2022-01-01 RX ADMIN — IPRATROPIUM BROMIDE AND ALBUTEROL SULFATE 3 ML: 2.5; .5 SOLUTION RESPIRATORY (INHALATION) at 13:39

## 2022-01-01 RX ADMIN — MAGNESIUM HYDROXIDE 10 ML: 2400 SUSPENSION ORAL at 08:14

## 2022-01-01 RX ADMIN — Medication 10 ML: at 20:15

## 2022-01-01 RX ADMIN — Medication 10 ML: at 08:13

## 2022-01-01 RX ADMIN — PREDNISONE 40 MG: 20 TABLET ORAL at 08:59

## 2022-01-01 RX ADMIN — ASPIRIN 81 MG: 81 TABLET, COATED ORAL at 09:30

## 2022-01-01 RX ADMIN — AMLODIPINE BESYLATE 5 MG: 5 TABLET ORAL at 08:43

## 2022-01-01 RX ADMIN — IPRATROPIUM BROMIDE AND ALBUTEROL SULFATE 3 ML: 2.5; .5 SOLUTION RESPIRATORY (INHALATION) at 19:23

## 2022-01-01 RX ADMIN — PREDNISONE 30 MG: 20 TABLET ORAL at 08:33

## 2022-01-01 RX ADMIN — ENOXAPARIN SODIUM 30 MG: 30 INJECTION SUBCUTANEOUS at 09:00

## 2022-01-01 RX ADMIN — LACTULOSE 10 G: 10 SOLUTION ORAL at 13:19

## 2022-01-01 RX ADMIN — ASPIRIN 81 MG: 81 TABLET, COATED ORAL at 08:33

## 2022-01-01 RX ADMIN — LACTULOSE 20 G: 20 SOLUTION ORAL at 08:59

## 2022-01-01 RX ADMIN — POTASSIUM CHLORIDE 10 MEQ: 7.46 INJECTION, SOLUTION INTRAVENOUS at 00:31

## 2022-01-01 RX ADMIN — CEFTRIAXONE 1 G: 1 INJECTION, POWDER, FOR SOLUTION INTRAMUSCULAR; INTRAVENOUS at 08:17

## 2022-01-01 RX ADMIN — POTASSIUM CHLORIDE AND SODIUM CHLORIDE 100 ML/HR: 900; 150 INJECTION, SOLUTION INTRAVENOUS at 15:37

## 2022-01-01 RX ADMIN — DOCUSATE SODIUM 100 MG: 100 CAPSULE, LIQUID FILLED ORAL at 21:34

## 2022-01-01 RX ADMIN — IPRATROPIUM BROMIDE AND ALBUTEROL SULFATE 3 ML: 2.5; .5 SOLUTION RESPIRATORY (INHALATION) at 19:29

## 2022-01-01 RX ADMIN — ATORVASTATIN CALCIUM 10 MG: 20 TABLET, FILM COATED ORAL at 09:19

## 2022-01-01 RX ADMIN — POLYETHYLENE GLYCOL 3350 17 G: 17 POWDER, FOR SOLUTION ORAL at 16:40

## 2022-01-01 RX ADMIN — PREDNISONE 30 MG: 20 TABLET ORAL at 09:30

## 2022-01-01 RX ADMIN — Medication 10 ML: at 20:17

## 2022-01-01 RX ADMIN — ASPIRIN 81 MG: 81 TABLET, COATED ORAL at 09:19

## 2022-01-17 NOTE — TELEPHONE ENCOUNTER
Caller: Sharonda Ridley    Relationship: Self    Best call back number: [phone] (H)  Requested Prescriptions:   Requested Prescriptions     Pending Prescriptions Disp Refills   • Umeclidinium Bromide (Incruse Ellipta) 62.5 MCG/INH aerosol powder  30 each 5     Sig: Inhale 1 puff Daily.        Pharmacy where request should be sent:    [facility] [address] [place] [address] [facility] [address] - [phone]  - [phone]   [phone]  Associate Signed OrdersPatient EstimateProvidersCurrent Interactions          Additional details provided by patient:     Does the patient have less than a 3 day supply:  [x] Yes  [] No    Ngoc Xiao Rep   01/17/22 15:44 EST

## 2022-02-01 NOTE — PROGRESS NOTES
"Chief Complaint  Sternal fracture    Subjective          Sharonda Ridley presents to [facility] THORACIC SURGERY  History of Present Illness     Ms. Siddiqi is an 89-year-old  female seen in the office today February 1, 2022 for evaluation and treatment of a sternal fracture.  Patient's granddaughter noticed a deformity of her anterior chest wall.  Patient underwent x-rays which showed fracture of the body of the sternum.  She was referred here for further evaluation.  Patient has no chest wall pain.  She has no recollection of any trauma or pain in her chest.  She has had several spinal compression fractures and has a history of osteoporosis.  She is due a bone density study soon.  She lives alone and has had no falls.  She uses a walker around her house.  She has an [employer] exercise bicycle which she rides regularly.  She recalls no injury while riding the bicycle.  She has a history of COPD and does cough particularly in the morning.  Sometimes these paroxysms of coughing can last some time and be quite violent.  Again she does not remember sudden chest pain after a cough.    Objective   Vital Signs:   /82 (BP Location: Left arm, Patient Position: Sitting, Cuff Size: Adult)   Pulse 84   Ht 162.6 cm (64\")   Wt 44.5 kg (98 lb)   SpO2 98%   BMI 16.82 kg/m²     Physical Exam     Neck: No masses.  No cervical or supraclavicular adenopathy    Chest: Deformity in the mid sternal area.  The sternum is stable.  Surrounding costal cartilages are also stable.  There is no tenderness to the mass.  No redness.  The overlying skin is thin and not red or warm.    Pulmonary: Lungs are clear to auscultation with equal breath sounds bilaterally    Cardiac: Regular rate and rhythm.  No murmurs or gallops.  No dependent edema.    Result Review :   The following data was reviewed by: Maldonado Dwyer III, MD on 02/01/2022:    Chest x-ray performed January 27, 2022 was independently reviewed " and compared to previous x-rays.  There is a well-healed mid sternal fracture with a displaced fracture segments.  There are chronic upper thoracic comfort pression deformities.  No acute fractures.  There is calcification of the thoracic aorta.  No pulmonary infiltrates or masses.  No pleural effusion.         Assessment and Plan    Diagnoses and all orders for this visit:    1. Closed fracture of sternum, unspecified portion of sternum, initial encounter (Primary)      I reviewed chest x-rays dating back to July 2020.  In 2020 and in 2021 her sternum was normal.  Sometime between June 21 of 2021 and October 28 of 2021 she developed a sternal fracture.  It is well-healed with a very stable chest wall.  She is having no pain.  There is no evidence for infection.  This lady has such significant osteoporosis that any surgical intervention to try to fix this fracture would most certainly fail and cause a bigger problem than she already has.  She is asymptomatic and I would recommend leaving this alone.  She may resume all of her normal activities without any restrictions.  I will be glad to see her anytime in the future if the need arises.  Thank you for allowing us to participate in the care of this very pleasant elderly lady.    I spent 22 minutes caring for Sharonda on this date of service. This time includes time spent by me in the following activities:preparing for the visit, reviewing tests, performing a medically appropriate examination and/or evaluation , counseling and educating the patient/family/caregiver, ordering medications, tests, or procedures, referring and communicating with other health care professionals , documenting information in the medical record, independently interpreting results and communicating that information with the patient/family/caregiver and care coordination  Follow Up   Return if symptoms worsen or fail to improve.  Patient was given instructions and counseling regarding her  condition or for health maintenance advice. Please see specific information pulled into the AVS if appropriate.

## 2022-02-01 NOTE — PROGRESS NOTES
February 1, 2022    Hello, may I speak with Sharonda Ridley?    DONE IN OFFICE    Tell me about your visit with us. What things went well?  Everything went ok.        We're always looking for ways to make our patients' experiences even better. Do you have recommendations on ways we may improve?  no    Overall were you satisfied with your first visit to our practice? yes       I appreciate you taking the time to speak with me today. Is there anything else I can do for you? no      Thank you, and have a great day.

## 2022-04-05 NOTE — PROGRESS NOTES
"    I N T E R N A L  M E D I C I N E  GUNJAN HAYWOOD, APRCARLOS      ENCOUNTER DATE:  04/05/2022    Sharonda Ridley / 89 y.o. / female      CHIEF COMPLAINT / REASON FOR OFFICE VISIT     Establish Care, Allergies, COPD, Carotid Artery Disease, and Hyperlipidemia      ASSESSMENT & PLAN     1. Seasonal allergic rhinitis due to pollen  -Continue Allegra and Flonase; Singulair    2. Carotid stenosis, asymptomatic, bilateral  -Aspirin and statin as patient is no longer willing to perform ultrasound of the carotids or follow-up with vascular    3. Mixed hyperlipidemia  -Continue atorvastatin  - Comprehensive Metabolic Panel  - Lipid Panel With / Chol / HDL Ratio  - TSH+Free T4  - CBC & Differential  - Urinalysis With Culture If Indicated -    4. Chronic obstructive pulmonary disease, unspecified COPD type (HCC)  -Continue Incruse Ellipta, increase rescue inhaler use, consider additional daily inhaler which patient is hesitant to add to her regimen  - CBC & Differential  - Urinalysis With Culture If Indicated -    5. Osteoporosis, unspecified osteoporosis type, unspecified pathological fracture presence  -Declines DEXA scan or further prescription treatment continue vitamin D and calcium    Orders Placed This Encounter   Procedures   • Comprehensive Metabolic Panel   • Lipid Panel With / Chol / HDL Ratio   • TSH+Free T4   • Urinalysis With Culture If Indicated -   • CBC Auto Differential   • CBC & Differential     No orders of the defined types were placed in this encounter.      SUMMARY/DISCUSSION  • 6 months for chronic medical, 1 year annual Medicare wellness    Next Appointment with me: Visit date not found    Return in about 6 months (around 10/5/2022) for Next scheduled follow up; 1 year medicare welllness .      VITAL SIGNS     Visit Vitals  /68   Pulse 84   Temp 97.5 °F (36.4 °C) (Temporal)   Ht 162.6 cm (64\")   Wt 45.2 kg (99 lb 9.6 oz)   LMP  (LMP Unknown)   SpO2 97%   BMI 17.10 kg/m²     Wt Readings from Last 3 " Encounters:   04/05/22 45.2 kg (99 lb 9.6 oz)   02/01/22 44.5 kg (98 lb)   12/30/21 44.5 kg (98 lb 3.2 oz)     Body mass index is 17.1 kg/m².      MEDICATIONS AT THE TIME OF OFFICE VISIT     Current Outpatient Medications on File Prior to Visit   Medication Sig   • acetaminophen (TYLENOL) 325 MG tablet Take 650 mg by mouth Every 6 (Six) Hours As Needed for Mild Pain . NOT TO EXCEED 3000MG IN A 24 HOUR PERIOD   • albuterol sulfate  (90 Base) MCG/ACT inhaler Inhale 1-2 puffs Every 4 (Four) Hours As Needed for Wheezing or Shortness of Air.   • amLODIPine (NORVASC) 5 MG tablet Take 1 tablet by mouth Daily.   • aspirin 81 MG EC tablet Take 81 mg by mouth Daily.   • atorvastatin (LIPITOR) 10 MG tablet Take 1 tablet by mouth Daily.   • fexofenadine (ALLEGRA) 180 MG tablet Take 1 tablet by mouth Daily.   • fluticasone (FLONASE) 50 MCG/ACT nasal spray SPRAY 2 SPRAYS IN EACH NOSTRIL DAILY   • montelukast (SINGULAIR) 10 MG tablet Take 1 tablet by mouth Every Evening.   • Spacer/Aero-Holding Chambers (OptiChamber Marnie) misc USE DEVICE WITH INHALER EVERY 4 HOURS AS NEEDED FOR SHORTNESS OF BREATH/WHEEZING   • Umeclidinium Bromide (Incruse Ellipta) 62.5 MCG/INH aerosol powder  Inhale 1 puff Daily.   • VITAMIN D, CHOLECALCIFEROL, PO Take  by mouth.   • ibuprofen (ADVIL,MOTRIN) 200 MG tablet Take 200 mg by mouth Every 6 (Six) Hours As Needed for Mild Pain .   • [DISCONTINUED] azelastine (ASTELIN) 0.1 % nasal spray 1 spray into the nostril(s) as directed by provider 2 (Two) Times a Day. Use in each nostril as directed     No current facility-administered medications on file prior to visit.         HISTORY OF PRESENT ILLNESS     Patient presents to establish care with new provider in office.  Previous patient of Yolanda pandey followed for COPD, likely osteoporosis, hypertension, previous hyponatremia which appeared to be caused by ACE inhibitor.    COPD treated with albuterol as needed, daily use of Singulair 10 mg daily  with dyspnea on exertion, Incruse Ellipta.  Moderately controlled, but has not been using rescue inhaler, recommend increasing usage.  Consider additional daily inhaler if needed.    Declines DEXA in April, as she will not take prescription medication to treat.     Followed by Dr. Dwyer thoracic surgery with history of closed fracture sternum, last seen on February 1, 2022.  Initially granddaughter noticed deformity of anterior chest wall.  X-ray showed fracture of the body of the sternum.  No pain.  She does have a history of several spinal compression fractures and history of osteoporosis.  It appears that her next DEXA scan is scheduled for April 22, 2022.  Lives alone but has not had any falls.  Rides her [employer] exercise bicycle regularly.  History of COPD and coughs in the morning.  12/20/2021 chest x-ray showing normal sternum.  Appears to be well-healed.  Believe that surgical intervention would cause more harm than good with current osteoporosis.    Last seen by neurosurgery Dr. Jj last seen in November 2021.  MRI of T-spine showed compression fracture of T4 on September 16, 2021 at [facility].  At the time she was having upper back pain.  No surgical intervention was suggested.    She does have severe stenosis of the carotid arteries.  Declines continued ultrasound or referral to vascular she states that she will not have any intervention completed.  She does continue aspirin 81 along with atorvastatin 10 mg daily.  Denies any dizziness or lightheadedness.    46 minutes were spent in assessment and plan along with review of history    REVIEW OF SYSTEMS     Constitutional neg except per HPI   Resp dyspnea in am   CV neg    PHYSICAL EXAMINATION     Physical Exam  Constitutional  No distress  Cardiovascular Rate  normal . Rhythm: regular . Heart sounds:  normal; bilateral carotid bruit present  Pulmonary/Chest  Effort normal. Breath sounds:  normal  Psychiatric  Alert. Judgment and thought content  normal. Mood normal     REVIEWED DATA     Labs:   Lab Results   Component Value Date    GLUCOSE 74 08/26/2021    BUN 20 08/26/2021    CREATININE 0.78 08/26/2021    EGFRIFNONA 69 08/26/2021    EGFRIFAFRI 84 08/26/2021    BCR 25.6 (H) 08/26/2021    K 4.8 08/26/2021    CO2 26.9 08/26/2021    CALCIUM 9.0 08/26/2021    PROTENTOTREF 6.8 07/29/2021    ALBUMIN 4.30 07/29/2021    LABIL2 1.7 07/29/2021    AST 29 07/29/2021    ALT 21 07/29/2021     Lab Results   Component Value Date    CHOL 172 04/05/2022    CHLPL 172 10/02/2019    TRIG 41 04/05/2022     (H) 04/05/2022    LDL 30 04/05/2022     Lab Results   Component Value Date    HGBA1C 5.60 12/21/2020     Lab Results   Component Value Date    TSH 1.120 04/05/2022     Lab Results   Component Value Date    WBC 8.38 04/05/2022    HGB 11.2 (L) 04/05/2022    HCT 35.5 04/05/2022    MCV 86.8 04/05/2022     04/05/2022         Imaging:           Medical Tests:             Summary of old records / correspondence / consultant report:           Request outside records:           *Examiner was wearing medical surgical mask, face shield and exam gloves during the entire duration of the visit. Patient was masked the entire time.   Minimum social distance of 6 ft maintained entire visit except if physical contact was necessary as documented.     Dictated utilizing Dragon dictation

## 2022-06-16 NOTE — ED PROVIDER NOTES
EMERGENCY DEPARTMENT ENCOUNTER    Room Number:  05/05  Date of encounter:  6/16/2022  PCP: Daniel Salvador APRN  Historian: Patient  Full history not obtainable due to: None    HPI:  Chief Complaint: SOA    Context: Sharonda Ridley is a 89 y.o. female with a PMH significant for COPD who presents to the ED c/o shortness of air.  She states that over the past several days she has been dealing with upper respiratory congestion which she relates to allergies.  However as she got out of bed several times last night to use the restroom she noticed increased shortness of breath that resolved after she rested.  This morning she is feeling much more short of breath.  She denies chest pain but admits to a nonproductive dry cough.  Denies fever, chills, dizziness, syncope.      MEDICAL RECORD REVIEW:    XR CHEST PA AND LATERAL-     HISTORY: Female who is 89 years-old,  sternal fracture     TECHNIQUE: Frontal and lateral views of the chest     COMPARISON: 10/28/2021     FINDINGS: Previously noted displaced sternal fracture is again  demonstrated with some sclerotic change suggesting healing response;  malalignment of osseous fragments does not appear significantly changed  from the prior exam. Chronic upper thoracic compression deformities are  noted. No new fractures are identified. The heart size is normal. Aorta  is calcified. Pulmonary vasculature is unremarkable. No focal pulmonary  consolidation, pleural effusion, or pneumothorax. No acute osseous  process.     IMPRESSION:  As described.     This report was finalized on 1/28/2022 3:09 PM by Dr. Clay Blum M.D.      PAST MEDICAL HISTORY    Active Ambulatory Problems     Diagnosis Date Noted   • Breathlessness on exertion 01/17/2016   • BP (high blood pressure) 01/17/2016   • Anxiety 01/24/2016   • Long-term use of high-risk medication 04/13/2016   • Seasonal allergic rhinitis due to pollen 11/02/2016   • Mixed hyperlipidemia 11/15/2017   • Carotid stenosis,  asymptomatic, bilateral 11/15/2017   • Hyperglycemia 03/14/2018   • Hyponatremia 12/21/2020   • Closed wedge compression fracture of T4 vertebra (HCC) 11/18/2021     Resolved Ambulatory Problems     Diagnosis Date Noted   • No Resolved Ambulatory Problems     Past Medical History:   Diagnosis Date   • Arthritis    • Hyperlipidemia    • Hypertension    • Methicillin resistant Staphylococcus aureus infection          PAST SURGICAL HISTORY  Past Surgical History:   Procedure Laterality Date   • ORIF PROXIMAL HUMERUS FRACTURE           FAMILY HISTORY  Family History   Problem Relation Age of Onset   • Stroke Mother    • Heart valve disorder Father    • Cancer Other         sibling         SOCIAL HISTORY  Social History     Socioeconomic History   • Marital status:    Tobacco Use   • Smoking status: Light Tobacco Smoker     Types: Cigarettes   • Smokeless tobacco: Never Used   Vaping Use   • Vaping Use: Never used   Substance and Sexual Activity   • Alcohol use: Yes     Comment: wine    • Drug use: Never   • Sexual activity: Defer         ALLERGIES  Codeine, Sulfa antibiotics, and Penicillins        REVIEW OF SYSTEMS  Review of Systems   Constitutional: Negative for chills and fever.   HENT: Positive for congestion.    Eyes: Negative for visual disturbance.   Respiratory: Positive for cough and shortness of breath.    Cardiovascular: Negative for chest pain.   Gastrointestinal: Negative for abdominal pain, nausea and vomiting.   Genitourinary: Negative for difficulty urinating.   Musculoskeletal: Negative for gait problem.   Skin: Negative for wound.   Neurological: Negative for syncope.   Psychiatric/Behavioral: Negative for suicidal ideas.      All systems reviewed and marked as negative except as listed in HPI       PHYSICAL EXAM    I have reviewed the triage vital signs and nursing notes.    ED Triage Vitals [06/16/22 1325]   Temp Heart Rate Resp BP SpO2   98.5 °F (36.9 °C) 86 18 144/67 100 %      Temp src  Heart Rate Source Patient Position BP Location FiO2 (%)   Tympanic Monitor Sitting Right arm --       Physical Exam  Constitutional:       General: She is not in acute distress.     Appearance: She is well-developed.   HENT:      Head: Normocephalic and atraumatic.   Eyes:      General: No scleral icterus.     Conjunctiva/sclera: Conjunctivae normal.   Neck:      Trachea: No tracheal deviation.   Cardiovascular:      Rate and Rhythm: Normal rate and regular rhythm.   Pulmonary:      Effort: Pulmonary effort is normal.      Breath sounds: Wheezing present.      Comments: Mildly increased work of breathing.  Speaks in broken sentences.  Abdominal:      Palpations: Abdomen is soft.      Tenderness: There is no abdominal tenderness.   Musculoskeletal:         General: No deformity.      Cervical back: Normal range of motion.   Lymphadenopathy:      Cervical: No cervical adenopathy.   Skin:     General: Skin is warm and dry.   Neurological:      Mental Status: She is alert and oriented to person, place, and time.   Psychiatric:         Behavior: Behavior normal.         Vital signs and nursing notes reviewed.            LAB RESULTS  Recent Results (from the past 24 hour(s))   ECG 12 Lead    Collection Time: 06/16/22  1:35 PM   Result Value Ref Range    QT Interval 378 ms   Comprehensive Metabolic Panel    Collection Time: 06/16/22  2:26 PM    Specimen: Blood   Result Value Ref Range    Glucose 84 65 - 99 mg/dL    BUN 11 8 - 23 mg/dL    Creatinine 0.59 0.57 - 1.00 mg/dL    Sodium 137 136 - 145 mmol/L    Potassium 3.2 (L) 3.5 - 5.2 mmol/L    Chloride 105 98 - 107 mmol/L    CO2 21.8 (L) 22.0 - 29.0 mmol/L    Calcium 7.0 (L) 8.2 - 9.6 mg/dL    Total Protein 5.4 (L) 6.0 - 8.5 g/dL    Albumin 2.80 (L) 3.50 - 5.20 g/dL    ALT (SGPT) 16 1 - 33 U/L    AST (SGOT) 19 1 - 32 U/L    Alkaline Phosphatase 68 39 - 117 U/L    Total Bilirubin 0.3 0.0 - 1.2 mg/dL    Globulin 2.6 gm/dL    A/G Ratio 1.1 g/dL    BUN/Creatinine Ratio 18.6 7.0  - 25.0    Anion Gap 10.2 5.0 - 15.0 mmol/L    eGFR 84.7 >60.0 mL/min/1.73   BNP    Collection Time: 06/16/22  2:26 PM    Specimen: Blood   Result Value Ref Range    proBNP 807.0 0.0 - 1,800.0 pg/mL   Troponin    Collection Time: 06/16/22  2:26 PM    Specimen: Blood   Result Value Ref Range    Troponin T <0.010 0.000 - 0.030 ng/mL   COVID-19, [facility] IN-HOUSE CEPHEID/ANABEL NP SWAB IN TRANSPORT MEDIA 8-12 HR TAT - Swab, Nasopharynx    Collection Time: 06/16/22  2:26 PM    Specimen: Nasopharynx; Swab   Result Value Ref Range    COVID19 Not Detected Not Detected - Ref. Range   Green Top (Gel)    Collection Time: 06/16/22  2:26 PM   Result Value Ref Range    Extra Tube Hold for add-ons.    Lavender Top    Collection Time: 06/16/22  2:26 PM   Result Value Ref Range    Extra Tube hold for add-on    Gold Top - SST    Collection Time: 06/16/22  2:26 PM   Result Value Ref Range    Extra Tube Hold for add-ons.    Light Blue Top    Collection Time: 06/16/22  2:26 PM   Result Value Ref Range    Extra Tube Hold for add-ons.    CBC Auto Differential    Collection Time: 06/16/22  2:26 PM    Specimen: Blood   Result Value Ref Range    WBC 6.87 3.40 - 10.80 10*3/mm3    RBC 4.57 3.77 - 5.28 10*6/mm3    Hemoglobin 12.0 12.0 - 15.9 g/dL    Hematocrit 36.7 34.0 - 46.6 %    MCV 80.3 79.0 - 97.0 fL    MCH 26.3 (L) 26.6 - 33.0 pg    MCHC 32.7 31.5 - 35.7 g/dL    RDW 14.3 12.3 - 15.4 %    RDW-SD 40.5 37.0 - 54.0 fl    MPV 8.7 6.0 - 12.0 fL    Platelets 451 (H) 140 - 450 10*3/mm3    Neutrophil % 73.0 42.7 - 76.0 %    Lymphocyte % 15.7 (L) 19.6 - 45.3 %    Monocyte % 8.0 5.0 - 12.0 %    Eosinophil % 2.3 0.3 - 6.2 %    Basophil % 0.7 0.0 - 1.5 %    Immature Grans % 0.3 0.0 - 0.5 %    Neutrophils, Absolute 5.01 1.70 - 7.00 10*3/mm3    Lymphocytes, Absolute 1.08 0.70 - 3.10 10*3/mm3    Monocytes, Absolute 0.55 0.10 - 0.90 10*3/mm3    Eosinophils, Absolute 0.16 0.00 - 0.40 10*3/mm3    Basophils, Absolute 0.05 0.00 - 0.20 10*3/mm3    Immature Grans,  Absolute 0.02 0.00 - 0.05 10*3/mm3    nRBC 0.0 0.0 - 0.2 /100 WBC       Ordered the above labs and independently reviewed the results.        RADIOLOGY  XR Chest 2 View    Result Date: 6/16/2022  XR CHEST 2 VW-  Clinical: CHF/COPD protocol  COMPARISON 1/27/2022  FINDINGS: Cardiac size within normal limits. There is atherosclerotic calcification of the aorta. Stable mediastinum and pelon. There is old granulomatous disease. No effusion, edema or consolidation.  Costochondral calcification as before. No suspicious pulmonary lesion has developed.  CONCLUSION: No acute cardiovascular or pulmonary process has developed.  This report was finalized on 6/16/2022 2:56 PM by Dr. Jeffery Vazquez M.D.        I ordered the above noted radiological studies. Independently reviewed by me and discussed with radiologist.  See dictation above for official radiology interpretation.      PROCEDURES    Procedures        MEDICATIONS GIVEN IN ER    Medications   sodium chloride 0.9 % flush 10 mL (has no administration in time range)   ipratropium-albuterol (DUO-NEB) nebulizer solution 6 mL (6 mL Nebulization Given 6/16/22 1434)   methylPREDNISolone sodium succinate (SOLU-Medrol) injection 125 mg (125 mg Intravenous Given 6/16/22 1511)         PROGRESS, DATA ANALYSIS, CONSULTS, AND MEDICAL DECISION MAKING    All labs have been independently reviewed by me.  All radiology studies have been reviewed by me.   EKG's independently reviewed by me.  Discussion below represents my analysis of pertinent findings related to patient's condition, differential diagnosis, treatment plan and final disposition.    DIFFERENTIAL DIAGNOSIS INCLUDE BUT NOT LIMITED TO:     COVID-19, pneumonia, COPD exacerbation    ED Course as of 06/16/22 1600   Thu Jun 16, 2022   1448 I viewed CXR on PACS system.  My interpretation is no pneumothorax.     [DC]      ED Course User Index  [DC] Enrique Boo PA       AS OF 16:00 EDT VITALS:    BP - 138/71  HR - 84  TEMP - 98.5  °F (36.9 °C) (Tympanic)  02 SATS - 93%    1600 I rechecked the patient.  She feels somewhat better.  I discussed the option for admission with the patient and family but after maintaining her oxygen in the mid to high 90s during ambulation the family feels more comfortable taking her home.  I discussed the patient's radiology findings (including all incidental findings), diagnosis, and plan for discharge.  A repeat exam reveals no new worrisome changes from my initial exam findings.  The patient understands that the fact that they are being discharged does not denote that nothing is abnormal, it indicates that no clinical emergency is present and that they must follow-up as directed in order to properly maintain their health.  Follow-up instructions (specifically listed below) and return to ER precautions were given at this time.  I specifically instructed the patient to follow-up with their PCP.  The patient understands and agrees with the plan, and is ready for discharge.  All questions answered.        DIAGNOSIS  Final diagnoses:   COPD exacerbation (HCC)         DISPOSITION  D/c    Pt masked in first look. I wore a surgical mask throughout my encounters with the pt. I performed hand hygiene on entry into the pt room and upon exit.      Enrique Boo PA  06/16/22 [id number]

## 2022-06-16 NOTE — ED PROVIDER NOTES
MD ATTESTATION NOTE    The CELINE and I have discussed this patient's history, physical exam, and treatment plan.  I have reviewed the documentation and personally had a face to face interaction with the patient. I affirm the documentation and agree with the treatment and plan.  The attached note describes my personal findings.    I provided a substantive portion of the care of this patient. I personally performed the physical exam, in its entirety.    History  89-year-old female who lives independently at home presents with increased shortness of breath over the last several days.  Patient has had occasional cough but denies chest pain.    Physical Exam  Vital Signs reviewed  GENERAL: Alert female no obvious distress, well-appearing.  Triage vitals reviewed.  O2 sats running 92 to 93% on room air upon my arrival  HENT: nares patent  EYES: no scleral icterus  CV: regular rhythm, regular rate- no murmur   RESPIRATORY: normal effort, decreased breath sounds bilaterally  ABDOMEN: soft  MUSCULOSKELETAL: no deformity  NEURO: Strength sensation and coordination are grossly intact.  Speech and mentation are unremarkable  SKIN: warm, dry      Disposition  I discussed treatment and evaluation of this patient with DELANEY Boo.  I reviewed x-ray, EKG and labs.  X-ray and labs fairly unremarkable and I suspect patient's shortness of breath is related to COPD exacerbation.  She has been treated with bronchodilators and steroids.  We will try to get her up and do a walk test to see how she does with saturations.  If she remains hypoxic or significant short of breath may need to admit.  Suspect she would do okay in the observation unit.       Enrique Fragoso MD  06/16/22 7471

## 2022-06-16 NOTE — DISCHARGE INSTRUCTIONS
Although you are being discharged from the ED today, I encourage you to return for worsening symptoms. Things can, and do, change such that treatment at home with medication may not be adequate. Specifically I recommend returning for fever > 102.0 F, difficulty breathing or any other worsening or alarming symptoms.     Rest. Drink plenty of fluids.  Follow up with PCP for further evaluation and management.  Follow up with primary care provider for further management and to have blood pressure rechecked.  Take all medications as prescribed.

## 2022-06-16 NOTE — ED NOTES
PT presents to ED via EMS from home. Family called today with reports of SOA that started today. Upon EMS arrival pt was 94% room air. PT was given albuterol treatment in route. PT is A&OX4, able to ambulate and in a mask at this time.     Patient was placed in face mask during first look triage.  Patient was wearing a face mask throughout encounter.  I wore personal protective equipment throughout the encounter.  Hand hygiene was performed before and after patient encounter.

## 2022-06-17 NOTE — OUTREACH NOTE
AMBULATORY CASE MANAGEMENT NOTE    Name and Relationship of Patient/Support Person: Sharonda Ridley M - Self    Patient Outreach  RN-ACM outreach with patient.  Discussed  6/16/22 ED visit regarding COPD exacerbation. Patient treated and discharged home . Patient states to be compliant with ED recommendations; has obtained medications as ED directed  and states symptoms have improved but does continue with episodes of SOB. Patient states to have 6/28/22 PCP appointment. Patient states improvement with congestion and cough and states to be compliant with medications; medical appointments; monitoring of blood pressure and use of Life Alert. Reviewed with patient ED AVS; education; role of RN-ACM and HRCM case management services. Patient verbalized understanding. Patient states to appreciate patient outreach and declines needs for further outreach at this time. No further questions or concerns voiced at this time.   Adult Patient Profile  Questions/Answers    Flowsheet Row Most Recent Value   Symptoms/Conditions Managed at Home respiratory   Respiratory Symptoms/Conditions COPD   Respiratory Management Strategies adequate rest, medication therapy, routine screening, other (see comments)  [Physician follow up]   Barriers to Taking Medication as Prescribed none   Equipment Currently Used at Home bp cuff, walker, standard   Primary Source of Support/Comfort extended family   Name of Support/Comfort Primary Source Grand daughters   People in Home alone        Social Work Assessment  Questions/Answers    Flowsheet Row Most Recent Value   People in Home alone   Functional Status Comments Patient states to be independent with ADL's,  light meal preparation,  ambulates with walker and receiving assistance with transportation.    Equipment Currently Used at Home bp cuff, walker, standard        Send Education  Questions/Answers    Flowsheet Row Most Recent Value   AWV Materials Send Materials   Other Patient  Education/Resources  24/7 [facility] Nurse Call Line, Advanced Care Planning, MyChart   24/7 Nurse Call Line Education Method Verbal   ACP Education Method Verbal   MyChart Education Method Verbal   Advanced Directives: Patient Has      SDOH updated and reviewed with the patient during this program:  Financial Resource Strain: Low Risk    • Difficulty of Paying Living Expenses: Not hard at all      Food Insecurity: No Food Insecurity   • Worried About Running Out of Food in the Last Year: Never true   • Ran Out of Food in the Last Year: Never true      Transportation Needs: No Transportation Needs   • Lack of Transportation (Medical): No   • Lack of Transportation (Non-Medical): No     Education Documentation  Unresolved/Worsening Symptoms, taught by Kandy Lee RN at 6/17/2022  4:58 PM.  Learner: Patient  Readiness: Acceptance  Method: Explanation  Response: Verbalizes Understanding    Provider Follow-Up, taught by Kandy Lee RN at 6/17/2022  4:58 PM.  Learner: Patient  Readiness: Acceptance  Method: Explanation  Response: Verbalizes Understanding    Fluid/Food Intake, taught by Kandy Lee RN at 6/17/2022  4:58 PM.  Learner: Patient  Readiness: Acceptance  Method: Explanation  Response: Verbalizes Understanding    Energy Conservation, taught by Kandy Lee RN at 6/17/2022  4:58 PM.  Learner: Patient  Readiness: Acceptance  Method: Explanation  Response: Verbalizes Understanding          KANDY WOLFF  Ambulatory Case Management    6/17/2022, 16:59 EDT

## 2022-06-20 NOTE — TELEPHONE ENCOUNTER
Caller: Amy Ridley    Relationship: Emergency Contact    Best call back number: [phone]     Who are you requesting to speak with (clinical staff, provider,  specific staff member): GIA HAYWOOD    What was the call regarding: PATIENT HAS BEEN IN AND OUT OF THE HOSPITAL. AMY WOULD LIKE TO DISCUSS GRANDMOTHER'S WORSING CONDITION IF POSSIBLE BEFORE HER APPOINTMENT ABOUT MEDICATION, POSSIBLE OXYGEN, AND HER LIVING ALONE. AMY WOULD LIKE THIS TO STAY CONFIDENTIAL.    Do you require a callback: YES

## 2022-06-21 NOTE — TELEPHONE ENCOUNTER
Spoke c grand-daughter Amy who had many concerns r/t pt.   Pt was recently in ER for COPD. Pt was given rescue inhaler to go with maintenance inhaler, but there are concerns r/t arthritis and hand use, if this is enough for the pt and should there be a neb med prescribed and possible use of O2 in the home and she questioned if Pulm should be involved to see what stage COPD the pt is in at this point.    Concerns of possible dementia were expressed r/t insomnia, anxiousness, and irritability. Requesting a mini cog test be done    Weakness has been noted. Pt is unable to walk short distances inside the home. Unable to prepare meals herself and at this time is unable to lift a plate.     Finally states that pt is not upfront with what is really going on. She still smokes. Family is requesting that staff stress/direct pt that certain this have to be done r/t her care, (ie; pulm doc, dexa scan, possible in home care or a LTCF).

## 2022-06-22 NOTE — TELEPHONE ENCOUNTER
Grand-daughter Amy has been notified of referral, test, and neb med. Amy has verbalized understanding.MM

## 2022-06-24 PROBLEM — J44.1 COPD EXACERBATION: Status: ACTIVE | Noted: 2022-01-01

## 2022-06-24 NOTE — TELEPHONE ENCOUNTER
On the way to ER now with SOA, constipation in ambulance.  Pt has refused to leave home. Home care eval has been done, but grand-daughters feel like she will need more that care just at night. They are looking at [facility] home for possible placement.   Family is wanting to make sure staff understand their concerns r/t pt care and what is best for her health [facility].   Callie was advised that APRN was aware of things going on and would be addressing concerns during appt. Advised this is why orders were placed prior to visit to try and get a jump on this situation.   Callie thanked staff and pt will be at [facility] appt. MM

## 2022-06-24 NOTE — TELEPHONE ENCOUNTER
Caller: Callie Wong    Relationship to patient: Emergency Contact    Best call back number: [phone]     Patient is needing: PATIENT'S GRANDDAUGHTER CALLIE IS CALLING TO STATE PATIENT IS BEING TAKEN BACK TO THE ER AGAIN FOR BREATHING ISSUES.    CALLIE IS REQUESTING A RETURN CALL FROM MS HAYWOOD AS SOON AS SHE HAS TIME.    PLEASE ADVISE.

## 2022-06-25 PROBLEM — J96.01 ACUTE RESPIRATORY FAILURE WITH HYPOXIA (HCC): Status: ACTIVE | Noted: 2022-01-01

## 2022-06-27 NOTE — TELEPHONE ENCOUNTER
Pt's granddaughter contacted on call provider.  She would like to make Daniel ARZATE aware that pt has been admitted to the hospital for COPD.  Pt's PCP made aware.    Also has concerns about pt returning to independent living due to pt's recent health decline.  Discussed with family member that they should make medical team aware of concerns - pt may need rehab/ assisted living following hospital discharge.  Amy acknowledged understanding.

## 2022-07-04 PROBLEM — K56.41 FECAL IMPACTION (HCC): Status: ACTIVE | Noted: 2022-01-01

## 2022-07-04 PROBLEM — K56.41 FECAL IMPACTION IN RECTUM (HCC): Status: ACTIVE | Noted: 2022-01-01

## 2022-07-04 PROBLEM — E87.6 HYPOKALEMIA: Status: ACTIVE | Noted: 2022-01-01

## 2022-07-04 PROBLEM — J44.9 COPD (CHRONIC OBSTRUCTIVE PULMONARY DISEASE) (HCC): Status: ACTIVE | Noted: 2022-01-01

## 2022-07-04 PROBLEM — R62.7 FAILURE TO THRIVE IN ADULT: Status: ACTIVE | Noted: 2022-01-01

## 2022-07-04 NOTE — ED TRIAGE NOTES
"Constipation x 5-7 days.  Has tried lactulose and enemas with only \"smears\" results.  staff placed a bhardwaj r/t urinary retention.  She has abd distention but is not tender.  She is on 1L O2 at baseline.    Pt also c/o neck pain    Patient was placed in face mask during first look triage.  Patient was wearing a face mask throughout encounter.  I wore personal protective equipment throughout the encounter.  Hand hygiene was performed before and after patient encounter.     "

## 2022-07-04 NOTE — CASE MANAGEMENT/SOCIAL WORK
Discharge Planning Assessment  [facility]     Patient Name: Sharonda Ridley  MRN: [id number]  Today's Date: 7/4/2022    Admit Date: 7/4/2022     Discharge Needs Assessment     Row Name 07/04/22 1554       Living Environment    People in Home other (see comments)  Pt is at [facility]    Current Living Arrangements extended care facility;other (see comments)  [facility]    Primary Care Provided by self    Provides Primary Care For no one, unable/limited ability to care for self    Family Caregiver if Needed parent(s)    Family Caregiver Names Amy Ridley [phone]    Quality of Family Relationships involved;helpful    Able to Return to Prior Arrangements yes    Living Arrangement Comments Pt intends to return to [place] upon discharge       Resource/Environmental Concerns    Resource/Environmental Concerns none    Transportation Concerns none       Transition Planning    Patient/Family Anticipates Transition to long-term care facility    Patient/Family Anticipated Services at Transition skilled nursing    Transportation Anticipated family or friend will provide;health plan transportation       Discharge Needs Assessment    Current Outpatient/Agency/Support Group inpatient rehabilitation facility    Equipment Currently Used at Home other (see comments)  Pt resides in a long term care facility    Concerns to be Addressed no discharge needs identified;denies needs/concerns at this time    Anticipated Changes Related to Illness none    Equipment Needed After Discharge none    Outpatient/Agency/Support Group Needs skilled nursing facility    Provided Post Acute Provider List? N/A    Provided Post Acute Provider Quality & Resource List? N/A               Discharge Plan     Row Name 07/04/22 1559       Plan    Plan Pt intends to return back to [place] upon discharge    Patient/Family in Agreement with Plan yes    Provided Post Acute Provider List? N/A    Provided  Post Acute Provider Quality & Resource List? N/A    Plan Comments Face sheet verified, role of CCP explained. Pt is a current resident at [facility] for short term rehab with the goal of returning home. Spoke with granddaughter at [facility], Amy Ridley, and informed that she needs to contact [facility] to place pts bed on a hold, family verbalized understanding. Advised pt and family if any further needs arise, to contact case management              Continued Care and Services - Admitted Since 7/4/2022    Coordination has not been started for this encounter.     Selected Continued Care - Episodes Includes selections from active Coordinated Care Management episodes    High Risk Care Management Episode start date: 6/17/2022   There are no active outsourced providers for this episode.             Selected Continued Care - Prior Encounters Includes selections from prior encounters from 4/5/2022 to 7/4/2022    Discharged on 6/30/2022 Admission date: 6/24/2022 - Discharge disposition: Rehab Facility or Unit (DC - External)    Destination     Service Provider Selected Services Address Phone Fax Patient Preferred    [facility] [address] [place] [address] [phone] --                       Demographic Summary    No documentation.                Functional Status    No documentation.                Psychosocial    No documentation.                Abuse/Neglect    No documentation.                Legal    No documentation.                Substance Abuse    No documentation.                Patient Forms    No documentation.                   Marj Cheung, RN

## 2022-07-04 NOTE — PLAN OF CARE
Goal Outcome Evaluation:    Patient was admitted for bowel impaction. She has a rounded abdomen that is firm with frequent bowel spasms which are painful. She is on 2L O2 continual nasal cannula. She is bedfast.     Salty is involved in care. Surgeon performed digital dilation of rectal sphinter and small amount of soft stool came out,    Family has brought in a DNR.

## 2022-07-04 NOTE — H&P
Patient Name:  Sharonda Ridley  YOB: 1930  MRN:  [id number]  Admit Date:  7/4/2022  Patient Care Team:  Daniel Salvador APRN as PCP - General (Internal Medicine)  Malcom Glover MD as Consulting Physician (Neurosurgery)  Linker, Maldonado HATCH III, MD as Surgeon (Thoracic Surgery)  Kandy Lee, RN as Ambulatory  ([facility])      Chief Complaint   Patient presents with   • Constipation   • abd distention       Subjective     Ms. Ridley is a 89 y.o. female with a history of HLD, HTN that presents to [facility] complaining of constipation. Patient was reportedly here last week for COPD exacerbation and sent to rehab at [place] at discharge. Per family at bedside, patient has been declining since arriving to rehab and hasn't had a bowel movement in about 6 days. Patient has reportedly not been getting up or eating like normal, and in the past couple days has actually had both a PICC and bhardwaj placed. Patient answers orientation questions appropriately, though family reports that patient has been intermittently confused as well. Patient's abdomen is quite distended and she complains of intermittent pain to her rectum. Disimpaction and enemas were attempted in ED without any improvement. CT abdomen showed large rectal distention concerning for fecal impaction with possible stercoral colitis, dilated colon and small bowel with fluid levels proximally likely due to distal obstruction from impaction, small fluid-filled hiatal hernia, and small left and trace right pleural effusions.     History of Present Illness    Past Medical History:   Diagnosis Date   • Arthritis    • Hyperlipidemia    • Hypertension    • Methicillin resistant Staphylococcus aureus infection      Past Surgical History:   Procedure Laterality Date   • CATARACT EXTRACTION Left    • ORIF PROXIMAL HUMERUS FRACTURE       Family History   Problem Relation Age of Onset   • Stroke Mother    •  Heart valve disorder Father    • Cancer Other         sibling     Social History     Tobacco Use   • Smoking status: Light Tobacco Smoker     Packs/day: 0.25     Years: 15.00     Pack years: 3.75     Types: Cigarettes   • Smokeless tobacco: Never Used   Vaping Use   • Vaping Use: Never used   Substance Use Topics   • Alcohol use: Not Currently   • Drug use: Never     Medications Prior to Admission   Medication Sig Dispense Refill Last Dose   • acetaminophen (TYLENOL) 325 MG tablet Take 650 mg by mouth Every 6 (Six) Hours As Needed for Mild Pain . NOT TO EXCEED 3000MG IN A 24 HOUR PERIOD      • albuterol (PROVENTIL) (2.5 MG/3ML) 0.083% nebulizer solution Take 2.5 mg by nebulization Every 4 (Four) Hours As Needed for Wheezing. 360 mL 12    • albuterol sulfate  (90 Base) MCG/ACT inhaler INHALE ONE TO TWO PUFFS BY MOUTH EVERY 4 HOURS AS NEEDED FOR WHEEZING OR SHORTNESS OF AIR 18 g 5    • albuterol sulfate  (90 Base) MCG/ACT inhaler Inhale 2 puffs Every 4 (Four) Hours As Needed for Wheezing. 6.7 g 0    • amLODIPine (NORVASC) 5 MG tablet Take 1 tablet by mouth Daily. 90 tablet 1    • aspirin 81 MG EC tablet Take 81 mg by mouth Daily.      • atorvastatin (LIPITOR) 10 MG tablet Take 1 tablet by mouth Daily. 90 tablet 2    • benzonatate (TESSALON) 200 MG capsule Take 1 capsule by mouth 3 (Three) Times a Day As Needed for Cough. 15 capsule 0    • fexofenadine (ALLEGRA) 180 MG tablet Take 1 tablet by mouth Daily.      • fluticasone (FLONASE) 50 MCG/ACT nasal spray SPRAY 2 SPRAYS IN EACH NOSTRIL DAILY 16 mL 5    • magnesium hydroxide (MILK OF MAGNESIA) 400 MG/5ML suspension Take  by mouth Daily As Needed for Constipation.      • montelukast (SINGULAIR) 10 MG tablet Take 1 tablet by mouth Every Evening. 90 tablet 3    • predniSONE (DELTASONE) 10 MG tablet Take 1 tablet by mouth Take As Directed. 30 tablet 0    • Spacer/Aero-Holding Chambers (OptiChamber Marnie) misc USE DEVICE WITH INHALER EVERY 4 HOURS AS NEEDED  FOR SHORTNESS OF BREATH/WHEEZING 1 each 3    • Umeclidinium Bromide (Incruse Ellipta) 62.5 MCG/INH aerosol powder  Inhale 1 puff Daily. 90 each 3    • VITAMIN D, CHOLECALCIFEROL, PO Take  by mouth.        Allergies:    Allergies   Allergen Reactions   • Codeine Itching   • Sulfa Antibiotics Arrhythmia   • Penicillins Rash       Review of Systems   Unable to perform ROS: Other   All other systems reviewed and are negative.       Objective    Vital Signs  Temp:  [97.2 °F (36.2 °C)-98.6 °F (37 °C)] 98.6 °F (37 °C)  Heart Rate:  [93] 93  Resp:  [16-18] 18  BP: (119-155)/(78-93) 155/93  SpO2:  [94 %-96 %] 96 %  on  Flow (L/min):  [1-2] 2;   Device (Oxygen Therapy): nasal cannula  Body mass index is 15.11 kg/m².    Physical Exam  Vitals and nursing note reviewed.   Constitutional:       Appearance: She is ill-appearing (chronic).      Comments: PICC to TRISTA   DARA:      Head: Normocephalic and atraumatic.      Nose: Nose normal.      Mouth/Throat:      Mouth: Mucous membranes are dry.   Eyes:      Extraocular Movements: Extraocular movements intact.   Cardiovascular:      Rate and Rhythm: Regular rhythm. Tachycardia present.      Heart sounds: Murmur heard.   Pulmonary:      Effort: Pulmonary effort is normal.      Breath sounds: Decreased breath sounds present.      Comments: Supplemental o2 @ 2L  Abdominal:      General: Bowel sounds are decreased. There is distension.   Genitourinary:     Comments: Ahmadi cath present to bedside  Musculoskeletal:         General: Normal range of motion.      Cervical back: Normal range of motion and neck supple. No rigidity.   Skin:     General: Skin is warm and dry.   Neurological:      General: No focal deficit present.      Mental Status: She is alert and oriented to person, place, and time.   Psychiatric:         Mood and Affect: Mood normal.         Thought Content: Thought content normal.         Cognition and Memory: Memory is impaired.         Judgment: Judgment normal.          Results Review:   I reviewed the patient's new clinical results including all labs and xrays.    Lab Results (last 24 hours)     Procedure Component Value Units Date/Time    CBC & Differential [[id number]]  (Abnormal) Collected: 07/04/22 1352    Specimen: Blood Updated: 07/04/22 1416    Narrative:      The following orders were created for panel order CBC & Differential.  Procedure                               Abnormality         Status                     ---------                               -----------         ------                     CBC Auto Differential[[id number]]        Abnormal            Final result                 Please view results for these tests on the individual orders.    Comprehensive Metabolic Panel [[id number]]  (Abnormal) Collected: 07/04/22 1352    Specimen: Blood Updated: 07/04/22 1430     Glucose 95 mg/dL      BUN 24 mg/dL      Creatinine 0.67 mg/dL      Sodium 142 mmol/L      Potassium 3.0 mmol/L      Chloride 103 mmol/L      CO2 28.1 mmol/L      Calcium 8.1 mg/dL      Total Protein 5.5 g/dL      Albumin 3.00 g/dL      ALT (SGPT) 25 U/L      AST (SGOT) 23 U/L      Alkaline Phosphatase 69 U/L      Total Bilirubin 0.3 mg/dL      Globulin 2.5 gm/dL      A/G Ratio 1.2 g/dL      BUN/Creatinine Ratio 35.8     Anion Gap 10.9 mmol/L      eGFR 82.1 mL/min/1.73      Comment: National Kidney Foundation and American Society of Nephrology (ASN) Task Force recommended calculation based on the Chronic Kidney Disease Epidemiology Collaboration (CKD-EPI) equation refit without adjustment for race.       Narrative:      GFR Normal >60  Chronic Kidney Disease <60  Kidney Failure <15      CBC Auto Differential [[id number]]  (Abnormal) Collected: 07/04/22 1352    Specimen: Blood Updated: 07/04/22 1416     WBC 8.06 10*3/mm3      RBC 4.62 10*6/mm3      Hemoglobin 12.2 g/dL      Hematocrit 37.2 %      MCV 80.5 fL      MCH 26.4 pg      MCHC 32.8 g/dL      RDW 14.5 %      RDW-SD 40.8 fl      MPV 9.0 fL       Platelets 297 10*3/mm3      Neutrophil % 82.4 %      Lymphocyte % 8.4 %      Monocyte % 7.9 %      Eosinophil % 0.7 %      Basophil % 0.2 %      Immature Grans % 0.4 %      Neutrophils, Absolute 6.63 10*3/mm3      Lymphocytes, Absolute 0.68 10*3/mm3      Monocytes, Absolute 0.64 10*3/mm3      Eosinophils, Absolute 0.06 10*3/mm3      Basophils, Absolute 0.02 10*3/mm3      Immature Grans, Absolute 0.03 10*3/mm3      nRBC 0.0 /100 WBC     Lipase [[id number]]  (Normal) Collected: 07/04/22 1352    Specimen: Blood Updated: 07/04/22 1430     Lipase 24 U/L     Magnesium [[id number]]  (Abnormal) Collected: 07/04/22 1352    Specimen: Blood Updated: 07/04/22 1530     Magnesium 2.9 mg/dL     COVID PRE-OP / PRE-PROCEDURE SCREENING ORDER (NO ISOLATION) - Swab, Nasopharynx [[id number]]  (Normal) Collected: 07/04/22 1354    Specimen: Swab from Nasopharynx Updated: 07/04/22 1445    Narrative:      The following orders were created for panel order COVID PRE-OP / PRE-PROCEDURE SCREENING ORDER (NO ISOLATION) - Swab, Nasopharynx.  Procedure                               Abnormality         Status                     ---------                               -----------         ------                     COVID-19,[facility]...[[id number]]  Normal              Final result                 Please view results for these tests on the individual orders.    COVID-19,[facility] CEPHEID/ANABEL NP SWAB IN TRANSPORT MEDIA 8-12 HR TAT - Swab, Nasopharynx [[id number]]  (Normal) Collected: 07/04/22 1354    Specimen: Swab from Nasopharynx Updated: 07/04/22 1445     COVID19 Not Detected    Narrative:      Fact sheet for providers: https://www.fda.gov/media/686705/download     Fact sheet for patients: https://www.fda.gov/media/592391/download          CT Abdomen Pelvis Without Contrast   Final Result       1.  Large rectal distention with inspissated stool and corresponding   wall thickening and adjacent fat stranding, concerning for fecal   impaction  with possible stercoral colitis. Dilated colon and small bowel   with fluid levels proximally are likely due to distal obstruction from   impaction.   2.  Small fluid-filled hiatal hernia.   3.  Small left and trace right pleural effusions.       Discussed with Dr. Ferraro at 3:12 PM.       This report was finalized on 7/4/2022 3:12 PM by Dr. Yi Dueñas M.D.            Assessment & Plan      Active Hospital Problems    Diagnosis  POA   • **Fecal impaction (HCC) [K56.41]  Yes   • Hypokalemia [E87.6]  Yes   • Failure to thrive in adult [R62.7]  Yes   • COPD (chronic obstructive pulmonary disease) (HCC) [J44.9]  Yes   • Mixed hyperlipidemia [E78.2]  Yes   • HTN (hypertension) [I10]  Yes      Resolved Hospital Problems   No resolved problems to display.     Fecal impaction/failure to thrive  -CT findings concerning for possible stercoral colitis and likely distal obstruction, small fluids filled hiatal hernia also noted  -general surgery consulted  -will given dose of Ativan now given her restlessness  -IVF  -no change with attempted disimpaction and enemas in ED, will await further consultant recs  -given patient's CT findings, restlessness, new onset immobility and decreased oral intake, will admit to hospitalist for further workup and evaluation    Hypokalemia  -prior known history, will replace with potassium protocol    COPD  -continue home inhalers    HLD/HTN  -chronic conditions, continue home medications    VTE Ppx  -SCDs    CODE status  -full    I discussed the patients findings and my recommendations with patient.    CARITO Kong  [facility]  07/04/22  4:15 PM EDT

## 2022-07-04 NOTE — ED PROVIDER NOTES
EMERGENCY DEPARTMENT ENCOUNTER    Room Number:  10/10  Date seen:  7/4/2022  PCP: Daniel Salvador APRN  Historian: Patient      HPI:  Chief Complaint: Constipation  A complete HPI/ROS/PMH/PSH/SH/FH are unobtainable due to: Nothing  Context: Sharonda Ridley is a 89 y.o. female who presents to the ED c/o constipation from [place].  Patient reports she is been constipated for several days.  She has tried oral laxatives and also lactulose enemas x3 without relief.  She reports she had maybe passed a small amount of stool and she believes that she is starting to pass some gas.  She does have a prior history of fecal impactions.  Today she was unable to urinate therefore they placed a Ahmadi catheter.  She reports that yesterday she was given multiple medications at once which caused her to vomit.  She otherwise denies abdominal discomfort or persistent vomiting.  She mostly gets rectal discomfort when she attempts to have bowel movement.  She denies black or bloody stool.            PAST MEDICAL HISTORY  Active Ambulatory Problems     Diagnosis Date Noted   • Breathlessness on exertion 01/17/2016   • BP (high blood pressure) 01/17/2016   • Anxiety 01/24/2016   • Long-term use of high-risk medication 04/13/2016   • Seasonal allergic rhinitis due to pollen 11/02/2016   • Mixed hyperlipidemia 11/15/2017   • Carotid stenosis, asymptomatic, bilateral 11/15/2017   • Hyperglycemia 03/14/2018   • Hyponatremia 12/21/2020   • Closed wedge compression fracture of T4 vertebra ([facility]) 11/18/2021   • COPD exacerbation ([facility]) 06/24/2022   • Acute respiratory failure with hypoxia ([facility]) 06/25/2022     Resolved Ambulatory Problems     Diagnosis Date Noted   • No Resolved Ambulatory Problems     Past Medical History:   Diagnosis Date   • Arthritis    • Hyperlipidemia    • Hypertension    • Methicillin resistant Staphylococcus aureus infection          PAST SURGICAL HISTORY  Past Surgical History:   Procedure Laterality Date   • CATARACT  EXTRACTION Left    • ORIF PROXIMAL HUMERUS FRACTURE           FAMILY HISTORY  Family History   Problem Relation Age of Onset   • Stroke Mother    • Heart valve disorder Father    • Cancer Other         sibling         SOCIAL HISTORY  Social History     Socioeconomic History   • Marital status:    Tobacco Use   • Smoking status: Light Tobacco Smoker     Packs/day: 0.25     Years: 15.00     Pack years: 3.75     Types: Cigarettes   • Smokeless tobacco: Never Used   Vaping Use   • Vaping Use: Never used   Substance and Sexual Activity   • Alcohol use: Not Currently   • Drug use: Never   • Sexual activity: Defer         ALLERGIES  Codeine, Sulfa antibiotics, and Penicillins        REVIEW OF SYSTEMS  Review of Systems   Review of all 14 systems is negative other than stated in the HPI above.      PHYSICAL EXAM  ED Triage Vitals [07/04/22 1055]   Temp Heart Rate Resp BP SpO2   97.2 °F (36.2 °C) 93 16 119/78 94 %      Temp src Heart Rate Source Patient Position BP Location FiO2 (%)   Tympanic Monitor -- -- --         GENERAL: Awake and alert, no acute distress  HENT: nares patent  EYES: no scleral icterus, EOMI  CV: regular rhythm, normal rate  RESPIRATORY: normal effort, clear breath sounds bilaterally, nasal cannula oxygen in place  ABDOMEN: soft, nondistended, bowel sounds normal, on rectal exam there seems to be increased sphincter tone and I was barely able to perform a digital rectal examination as result.  She does have some firm stool in the rectal vault.  No hemorrhoids.  Exam chaperoned by JENISE Morgan.  : Ahmadi catheter in place  MUSCULOSKELETAL: no deformity  NEURO: alert, moves all extremities, follows commands, cranial nerves II to XII grossly intact  PSYCH:  calm, cooperative  SKIN: warm, dry    Vital signs and nursing notes reviewed.          LAB RESULTS  Recent Results (from the past 24 hour(s))   Comprehensive Metabolic Panel    Collection Time: 07/03/22  5:15 PM    Specimen: Blood   Result Value Ref  Range    Glucose 78 65 - 99 mg/dL    BUN 27 (H) 8 - 23 mg/dL    Creatinine 0.64 0.57 - 1.00 mg/dL    Sodium 137 136 - 145 mmol/L    Potassium 3.4 (L) 3.5 - 5.2 mmol/L    Chloride 99 98 - 107 mmol/L    CO2 28.9 22.0 - 29.0 mmol/L    Calcium 8.0 (L) 8.2 - 9.6 mg/dL    Total Protein 5.0 (L) 6.0 - 8.5 g/dL    Albumin 3.00 (L) 3.50 - 5.20 g/dL    ALT (SGPT) 24 1 - 33 U/L    AST (SGOT) 20 1 - 32 U/L    Alkaline Phosphatase 64 39 - 117 U/L    Total Bilirubin 0.4 0.0 - 1.2 mg/dL    Globulin 2.0 gm/dL    A/G Ratio 1.5 g/dL    BUN/Creatinine Ratio 42.2 (H) 7.0 - 25.0    Anion Gap 9.1 5.0 - 15.0 mmol/L    eGFR 83.0 >60.0 mL/min/1.73   CBC Auto Differential    Collection Time: 07/03/22  5:15 PM    Specimen: Blood   Result Value Ref Range    WBC 7.32 3.40 - 10.80 10*3/mm3    RBC 4.36 3.77 - 5.28 10*6/mm3    Hemoglobin 11.7 (L) 12.0 - 15.9 g/dL    Hematocrit 34.3 34.0 - 46.6 %    MCV 78.7 (L) 79.0 - 97.0 fL    MCH 26.8 26.6 - 33.0 pg    MCHC 34.1 31.5 - 35.7 g/dL    RDW 14.1 12.3 - 15.4 %    RDW-SD 39.5 37.0 - 54.0 fl    MPV 8.9 6.0 - 12.0 fL    Platelets 305 140 - 450 10*3/mm3    Neutrophil % 73.9 42.7 - 76.0 %    Lymphocyte % 12.2 (L) 19.6 - 45.3 %    Monocyte % 10.1 5.0 - 12.0 %    Eosinophil % 3.3 0.3 - 6.2 %    Basophil % 0.1 0.0 - 1.5 %    Immature Grans % 0.4 0.0 - 0.5 %    Neutrophils, Absolute 5.41 1.70 - 7.00 10*3/mm3    Lymphocytes, Absolute 0.89 0.70 - 3.10 10*3/mm3    Monocytes, Absolute 0.74 0.10 - 0.90 10*3/mm3    Eosinophils, Absolute 0.24 0.00 - 0.40 10*3/mm3    Basophils, Absolute 0.01 0.00 - 0.20 10*3/mm3    Immature Grans, Absolute 0.03 0.00 - 0.05 10*3/mm3    nRBC 0.0 0.0 - 0.2 /100 WBC   Comprehensive Metabolic Panel    Collection Time: 07/04/22  1:52 PM    Specimen: Blood   Result Value Ref Range    Glucose 95 65 - 99 mg/dL    BUN 24 (H) 8 - 23 mg/dL    Creatinine 0.67 0.57 - 1.00 mg/dL    Sodium 142 136 - 145 mmol/L    Potassium 3.0 (L) 3.5 - 5.2 mmol/L    Chloride 103 98 - 107 mmol/L    CO2 28.1 22.0 -  29.0 mmol/L    Calcium 8.1 (L) 8.2 - 9.6 mg/dL    Total Protein 5.5 (L) 6.0 - 8.5 g/dL    Albumin 3.00 (L) 3.50 - 5.20 g/dL    ALT (SGPT) 25 1 - 33 U/L    AST (SGOT) 23 1 - 32 U/L    Alkaline Phosphatase 69 39 - 117 U/L    Total Bilirubin 0.3 0.0 - 1.2 mg/dL    Globulin 2.5 gm/dL    A/G Ratio 1.2 g/dL    BUN/Creatinine Ratio 35.8 (H) 7.0 - 25.0    Anion Gap 10.9 5.0 - 15.0 mmol/L    eGFR 82.1 >60.0 mL/min/1.73   CBC Auto Differential    Collection Time: 07/04/22  1:52 PM    Specimen: Blood   Result Value Ref Range    WBC 8.06 3.40 - 10.80 10*3/mm3    RBC 4.62 3.77 - 5.28 10*6/mm3    Hemoglobin 12.2 12.0 - 15.9 g/dL    Hematocrit 37.2 34.0 - 46.6 %    MCV 80.5 79.0 - 97.0 fL    MCH 26.4 (L) 26.6 - 33.0 pg    MCHC 32.8 31.5 - 35.7 g/dL    RDW 14.5 12.3 - 15.4 %    RDW-SD 40.8 37.0 - 54.0 fl    MPV 9.0 6.0 - 12.0 fL    Platelets 297 140 - 450 10*3/mm3    Neutrophil % 82.4 (H) 42.7 - 76.0 %    Lymphocyte % 8.4 (L) 19.6 - 45.3 %    Monocyte % 7.9 5.0 - 12.0 %    Eosinophil % 0.7 0.3 - 6.2 %    Basophil % 0.2 0.0 - 1.5 %    Immature Grans % 0.4 0.0 - 0.5 %    Neutrophils, Absolute 6.63 1.70 - 7.00 10*3/mm3    Lymphocytes, Absolute 0.68 (L) 0.70 - 3.10 10*3/mm3    Monocytes, Absolute 0.64 0.10 - 0.90 10*3/mm3    Eosinophils, Absolute 0.06 0.00 - 0.40 10*3/mm3    Basophils, Absolute 0.02 0.00 - 0.20 10*3/mm3    Immature Grans, Absolute 0.03 0.00 - 0.05 10*3/mm3    nRBC 0.0 0.0 - 0.2 /100 WBC   Lipase    Collection Time: 07/04/22  1:52 PM    Specimen: Blood   Result Value Ref Range    Lipase 24 13 - 60 U/L   COVID-19,[facility] IN-HOUSE CEPHEID/ANABEL NP SWAB IN TRANSPORT MEDIA 8-12 HR TAT - Swab, Nasopharynx    Collection Time: 07/04/22  1:54 PM    Specimen: Nasopharynx; Swab   Result Value Ref Range    COVID19 Not Detected Not Detected - Ref. Range       Ordered the above labs and reviewed the results.        RADIOLOGY  CT Abdomen Pelvis Without Contrast    Result Date: 7/4/2022  CT ABDOMEN PELVIS WO CONTRAST-  HISTORY:   Constipation.  TECHNIQUE:  CT of the abdomen and pelvis was performed without intravenous contrast.  Evaluation of the solid organs and vasculature is limited without intravenous contrast.  Radiation dose reduction techniques were utilized, including automated exposure control and exposure modulation based on body size.  COMPARISON:  [facility] 06/29/2022  FINDINGS:  Heart size is normal. There is no pericardial effusion. There is mild bibasilar atelectasis and/or scarring with small left and trace right pleural effusions. The liver is normal in size. A few hypodense foci in the liver are too small to accurately characterize. The gallbladder is present. The spleen is normal in size. There are no pancreatic calcifications. There is mild thickening of the adrenal glands. There is no hydronephrosis. There is marked, diffuse calcific aortoiliac atherosclerosis. There is marked rectal distention with inspissated stool, corresponding wall thickening, and moderate adjacent fat stranding. The colon is distended proximally with multiple air-fluid levels. The GI tract is difficult to fully assess without intravenous and oral contrast and due to paucity of intra-abdominal and pelvic fat, however, there are likely also at least mildly dilated small bowel loops. There is a small fluid-filled hiatal hernia. The appendix is present and normal in size. The bladder is collapsed around a Ahmdai catheter. There are calcified uterine fibroids. There is pelvic floor laxity. There is partially imaged surgical hardware in the proximal left femur. There is diffuse osseous demineralization. There is multilevel degenerative disc disease. 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        1.  Large rectal distention with inspissated stool and corresponding wall thickening and adjacent fat stranding, concerning for fecal impaction with possible stercoral colitis. Dilated colon and small bowel with fluid levels proximally are likely due to distal  obstruction from impaction. 2.  Small fluid-filled hiatal hernia. 3.  Small left and trace right pleural effusions.  Discussed with Dr. Ferraro at 3:12 PM.  This report was finalized on 7/4/2022 3:12 PM by Dr. Yi Dueñas M.D.        Ordered the above noted radiological studies. Reviewed by me in PACS.            PROCEDURES  Procedures              MEDICATIONS GIVEN IN ER  Medications   sodium chloride 0.9 % flush 10 mL (has no administration in time range)   sodium chloride 0.9 % flush 10 mL (has no administration in time range)                   MEDICAL DECISION MAKING, PROGRESS, and CONSULTS    All labs have been independently reviewed by me.  All radiology studies have been reviewed by me and discussed with radiologist dictating the report.   EKG's independently viewed and interpreted by me.  Discussion below represents my analysis of pertinent findings related to patient's condition, differential diagnosis, treatment plan and final disposition.      Differential diagnosis includes but is not limited to:  Fecal impaction  Small bowel obstruction  Volvulus  Ileus  Eden syndrome      ED Course as of 07/04/22 1517   Mon Jul 04, 2022   1146 Medical record review: I reviewed recent discharge summary from 6/30/2022 where patient was admitted for a COPD exacerbation.  I also reviewed recent [facility] abdomen that showed distention of the rectum concerning for stool impaction. [JR]   1150 Patient with constipation that seems to be secondary to fecal impaction.  Her urinary retention is likely secondary to fecal impaction as well.  She has a Ahmadi catheter in place that was placed at nursing facility.  I was unable to perform manual disimpaction due to patient having increased sphincter tone.  I have ordered an enema, will reassess following. [JR]   1341 Patient tolerated the enema however she did not have much stool out.  She still has rectal discomfort and her family thinks that her abdomen appears distended.  I  discussed plan to obtain labs, CT abdomen imaging, and possible need for admission for serial enemas.  Unfortunately I was unable to disimpact her manually. [JR]   1514 Discussed with CARITO Aldana in the observation unit who agrees to admit for further management of fecal impaction.  I will order another enema at this time.  Patient may possibly need colonoscopic disimpaction if symptoms or not alleviated by serial enemas. [JR]   1516 I discussed the CT abdomen findings with Dr. Dueñas, radiologist, who reports findings of fecal impaction with associated dilatation of small and large bowel loops but no evidence of acute mechanical obstruction. [JR]      ED Course User Index  [JR] Gurjit Ferraro MD              I wore an N95 mask, face shield, and gloves during this patient encounter.  Patient also wearing a surgical mask.  Hand hygeine performed before and after seeing the patient.    DIAGNOSIS  Final diagnoses:   Fecal impaction in rectum (HCC)   Hypokalemia         DISPOSITION  Admit to observation unit            Latest Documented Vital Signs:  As of 15:17 EDT  BP- 119/78 HR- 93 Temp- 97.2 °F (36.2 °C) (Tympanic) O2 sat- 94%        --    Please note that portions of this were completed with a voice recognition program.          Gurjit Ferraro MD  07/04/22 [id number]

## 2022-07-04 NOTE — CONSULTS
[facility]   Consult Note    Patient Name: Sharonda Ridley  : 1930  MRN: [id number]  Primary Care Physician:  Daniel Salvador APRN  Referring Physician: Indra Lomeli MD  Date of admission: 2022    Inpatient General Surgery Consult  Consult performed by: David Williamson Jr., MD  Consult ordered by: Kaila Ferrara APRN        Subjective   Subjective     Reason for Consult/ Chief Complaint: Possible fecal impaction    History of Present Illness  Sharonda Ridley is a pleasant 89 y.o. female with multiple medical problems who was brought to the emergency room from [place] for constipation.  She has a previous history of a hemorrhoidectomy that was complicated by anal stenosis.  Over the many years she has been able to maintain bowel function despite the anal stenosis.  She has developed constipation of the past several days which she tried to treat with oral laxatives as well as enemas but was unsuccessful.  She has developed abdominal distention and has frequent abdominal cramps.  She is moving some flatus and some soft stool but not enough to relieve the distention or pressure.  A CT scan of the abdomen and pelvis was performed which is limited by a lack of contrast but does show dilated loops of small bowel as well as a distended colon and a large amount of stool in the rectum that is concerning for a fecal impaction.  There is also concern raised for possible stercoral colitis.    She was recently hospitalized, discharged on 2022 for a COPD exacerbation and acute respiratory failure with hypoxia.  She was treated with steroids and had improvement in her pulmonary status.  She did have some constipation during that hospitalization and required an enema.    Review of Systems   Respiratory: Positive for shortness of breath. Negative for chest tightness.    Cardiovascular: Negative for chest pain and palpitations.   Gastrointestinal: Positive for abdominal distention, abdominal pain and  constipation. Negative for blood in stool, nausea and vomiting.   Neurological: Negative for syncope and headaches.        Personal History     Past Medical History:   Diagnosis Date   • Arthritis    • Hyperlipidemia    • Hypertension    • Methicillin resistant Staphylococcus aureus infection        Past Surgical History:   Procedure Laterality Date   • CATARACT EXTRACTION Left    • ORIF PROXIMAL HUMERUS FRACTURE         Family History: family history includes Cancer in an other family member; Heart valve disorder in her father; Stroke in her mother. Otherwise pertinent FHx was reviewed and not pertinent to current issue.    Social History:  reports that she has been smoking cigarettes. She has a 3.75 pack-year smoking history. She has never used smokeless tobacco. She reports previous alcohol use. She reports that she does not use drugs.    Home Medications:   Cholecalciferol, OptiChamber Marnie, Umeclidinium Bromide, acetaminophen, albuterol, albuterol sulfate HFA, amLODIPine, aspirin, atorvastatin, benzonatate, fexofenadine, fluticasone, magnesium hydroxide, montelukast, and predniSONE    Allergies:  Allergies   Allergen Reactions   • Codeine Itching   • Sulfa Antibiotics Arrhythmia   • Penicillins Rash       Objective    Objective     Vitals:  Temp:  [97.2 °F (36.2 °C)-98.6 °F (37 °C)] 98.6 °F (37 °C)  Heart Rate:  [93] 93  Resp:  [16-18] 18  BP: (119-155)/(78-93) 155/93  Flow (L/min):  [1-2] 2    Physical Exam  Constitutional:       Appearance: She is ill-appearing. She is not toxic-appearing.   Abdominal:      General: There is distension.      Palpations: Abdomen is soft. There is no mass.      Tenderness: There is no abdominal tenderness.      Comments: The abdomen is soft and nontender.  It is distended.  The exam is benign.   Genitourinary:     Comments: Rectal: There is anal stenosis present which was dilated with a rectal exam.  There is soft stool in the rectal vault with no evidence of impaction.   There are no masses palpable.  The rectal exam was repeated on several occasions in order to dilate the anal stenosis and each time resulted in the release of soft brown stool with no blood.  Neurological:      Mental Status: She is alert.   Psychiatric:         Behavior: Behavior is cooperative.         Result Review    Result Review:  I have personally reviewed the results from the time of this admission to 7/4/2022 18:05 EDT and agree with these findings:  [x]  Laboratory list / accordion  []  Microbiology  [x]  Radiology  []  EKG/Telemetry   []  Cardiology/Vascular   []  Pathology  [x]  Old records  []  Other:      Assessment & Plan   Assessment / Plan     Brief Patient Summary with assessment and plan:  Sharonda Ridley is a 89 y.o. female who has multiple medical problems and presented with constipation and a CT scan of the abdomen and pelvis that suggested fecal impaction.    1.  Constipation: The patient has constipation that is likely related to decreased colonic motility related to her limited activity and underlying anal stenosis creating high enough pressure that the diminished colonic motility cannot overcome it.  The anal stenosis was partially dilated with digital rectal exam and soft stool was released.  There is no fecal impaction is all the stool is soft.  There should be no stercolar colitis in light of soft stool.  I will order enemas to try to improve her bowel function.        David Williamson Jr., MD

## 2022-07-05 PROBLEM — R33.9 URINE RETENTION: Status: ACTIVE | Noted: 2022-01-01

## 2022-07-05 PROBLEM — N39.0 UTI (URINARY TRACT INFECTION), BACTERIAL: Status: ACTIVE | Noted: 2022-01-01

## 2022-07-05 PROBLEM — E87.6 HYPOKALEMIA: Status: RESOLVED | Noted: 2022-01-01 | Resolved: 2022-01-01

## 2022-07-05 PROBLEM — A49.9 UTI (URINARY TRACT INFECTION), BACTERIAL: Status: ACTIVE | Noted: 2022-01-01

## 2022-07-05 NOTE — PROGRESS NOTES
Chief Complaint:    Constipation    Subjective:    The patient is generally feeling better today.  She continues to have some crampy abdominal pain.    Objective:    Temp:  [97.7 °F (36.5 °C)-98.6 °F (37 °C)] 97.8 °F (36.6 °C)  Heart Rate:  [68-80] 68  Resp:  [18] 18  BP: (126-155)/(65-93) 146/65    Physical Exam  Constitutional:       Appearance: She is not ill-appearing or toxic-appearing.   Abdominal:      Palpations: Abdomen is soft.      Tenderness: There is no abdominal tenderness.   Neurological:      Mental Status: She is alert.   Psychiatric:         Behavior: Behavior is cooperative.         Results:    WBC is 8.36.  H/H is 11.8/36.1.  BUN is 21 creatinine 0.63.    Assessment/Plan:    The patient has constipation that is partially due to anal stenosis.  The anus was dilated during the rectal exam and she was able to pass stool.  I will add MiraLAX and milk of magnesia to try to stimulate GI activity.    David Williamson Jr., M.D.

## 2022-07-05 NOTE — CONSULTS
Nutrition Services    Nutrition assessment triggered by MST score-2, skin report-coccyx and L heel, and RN consult.  Patient admitted with fecal impaction-given milk of magnesia and enema; FTT.  Visited with patient and family-#, currently 95#. Decreased appetite/po intake.  Based on ASPEN/AND criteria, patient qualifies for Moderate starvation-related Malnutrition.  Provided nutrition therapy. Went over food preferences. Agreed to try nutritional supplement-boost BID to help meet nutritional needs. Encouraged adequate fiber and fluids to help bowel movements.  RD to follow.  Malnutrition Severity Assessment      Patient meets criteria for : Moderate (non-severe) Malnutrition  Malnutrition Type (last 8 hours)     Malnutrition Severity Assessment     Row Name 07/05/22 1148       Malnutrition Severity Assessment    Malnutrition Type Starvation - Related Malnutrition    Row Name 07/05/22 1148       Insufficient Energy Intake     Insufficient Energy Intake Findings Moderate    Insufficient Energy Intake  <75% of est. energy requirement for > or equal to 3 months    Row Name 07/05/22 1148       Unintentional Weight Loss     Unintentional Weight Loss Findings Moderate    Unintentional Weight Loss  Weight loss of 5% in one month    Row Name 07/05/22 1148       Muscle Loss    Loss of Muscle Mass Findings Moderate    Buddhist Region Moderate - slight depression    Clavicle Bone Region Moderate - some protrusion in females, visible in males    Acromion Bone Region Moderate - acromion may slightly protrude    Row Name 07/05/22 1148       Fat Loss    Subcutaneous Fat Loss Findings Moderate    Orbital Region  Moderate -  somewhat hollowness, slightly dark circles    Row Name 07/05/22 1148       Criteria Met (Must meet criteria for severity in at least 2 of these categories: M Wasting, Fat Loss, Fluid, Secondary Signs, Wt. Status, Intake)    Patient meets criteria for  Moderate (non-severe) Malnutrition                "      CLINICAL NUTRITION ASSESSMENT      Reason for Assessment BMI, MST score 2+, Nurse Admission Screen, Pressure Injury and/or Non-Healing Wound     H&P      Past Medical History:   Diagnosis Date   • Arthritis    • Hyperlipidemia    • Hypertension    • Methicillin resistant Staphylococcus aureus infection        Past Surgical History:   Procedure Laterality Date   • CATARACT EXTRACTION Left    • ORIF PROXIMAL HUMERUS FRACTURE          Current Problems     Admitted with fecal impaction, FTT     Nutritional Risk Screening       MST SCORE 2   Risk Screening Criteria Non-healing wound or pressure injury, Reduced oral intake over the last month     Encounter Information        Nutrition/Diet History:       Food Preferences:      Supplements:      Typical Activity Pattern: Sedentary     Factors Affecting Intake: altered mental status, anorexia, decreased appetite     Tests/Procedures: No new tests/procedures       Anthropometrics        Current Height  Current Weight Height: 162.6 cm (64\")  Weight: 39.9 kg (88 lb) (07/04/22 1500)       Admission Weight    Ideal Body Weight (IBW)    Usual Body Weight (UBW)        Trending Weight Hx     Weight Change              PTA:     Wt Readings from Last 30 Encounters:   07/04/22 1500 39.9 kg (88 lb)   07/04/22 1137 44.5 kg (98 lb)   06/30/22 0500 44.5 kg (98 lb)   06/29/22 0557 44.4 kg (97 lb 14.4 oz)   06/28/22 1537 44 kg (97 lb)   06/28/22 0612 44 kg (97 lb)   06/27/22 1350 42.6 kg (94 lb)   06/27/22 0518 42.9 kg (94 lb 8 oz)   06/26/22 0445 43.7 kg (96 lb 4.8 oz)   06/25/22 0605 44.8 kg (98 lb 11.2 oz)   06/24/22 1945 44.2 kg (97 lb 6.4 oz)   04/05/22 1519 45.2 kg (99 lb 9.6 oz)   02/01/22 1428 44.5 kg (98 lb)   12/30/21 1444 44.5 kg (98 lb 3.2 oz)   11/18/21 1526 45.4 kg (100 lb)   10/28/21 1502 45.4 kg (100 lb)   08/26/21 1436 45 kg (99 lb 3.2 oz)   07/29/21 1420 43.1 kg (95 lb)   06/21/21 1656 47.2 kg (104 lb)   04/23/21 1535 47 kg (103 lb 9.6 oz)   04/08/21 1427 46.3 kg " (102 lb)   02/04/21 1439 46.2 kg (101 lb 12.8 oz)   12/21/20 1433 45.4 kg (100 lb)   08/05/20 1440 46.2 kg (101 lb 12.8 oz)   07/23/20 1504 46.2 kg (101 lb 12.8 oz)   11/25/19 1440 48.5 kg (107 lb)   10/02/19 1323 48.5 kg (107 lb)   03/13/19 1434 47.4 kg (104 lb 6.4 oz)   01/16/19 1425 48.1 kg (106 lb)   09/12/18 1446 47.6 kg (105 lb)   03/14/18 1416 48.5 kg (107 lb)   11/15/17 1338 47.2 kg (104 lb)   06/07/17 1526 47.2 kg (104 lb)   05/03/17 1345 45.8 kg (101 lb)   11/02/16 1437 45.8 kg (101 lb)   04/13/16 1332 45.9 kg (101 lb 3.2 oz)   01/25/16 1503 49 kg (108 lb)   09/23/15 1318 51.9 kg (114 lb 8.1 oz)      BMI kg/m2 Body mass index is 15.11 kg/m².       Labs       Pertinent Labs    Results from last 7 days   Lab Units 07/05/22  0550 07/04/22  1352 07/03/22  1715 06/30/22  0555   SODIUM mmol/L 136 142 137 133*   POTASSIUM mmol/L 4.2 3.0* 3.4* 3.9   CHLORIDE mmol/L 99 103 99 95*   CO2 mmol/L 27.0 28.1 28.9 29.4*   BUN mg/dL 21 24* 27* 26*   CREATININE mg/dL 0.63 0.67 0.64 0.66   CALCIUM mg/dL 7.7* 8.1* 8.0* 8.4   BILIRUBIN mg/dL  --  0.3 0.4 0.4   ALK PHOS U/L  --  69 64 61   ALT (SGPT) U/L  --  25 24 22   AST (SGOT) U/L  --  23 20 17   GLUCOSE mg/dL 80 95 78 93     Results from last 7 days   Lab Units 07/05/22  0550 07/04/22  1352   MAGNESIUM mg/dL  --  2.9*   HEMOGLOBIN g/dL 11.8* 12.2   HEMATOCRIT % 36.1 37.2   WBC 10*3/mm3 8.36 8.06     Results from last 7 days   Lab Units 07/05/22  0550 07/04/22  1352 07/03/22  1715 06/30/22  0555 06/29/22  0821   PLATELETS 10*3/mm3 272 297 305 351 398     COVID19   Date Value Ref Range Status   07/04/2022 Not Detected Not Detected - Ref. Range Final     Lab Results   Component Value Date    HGBA1C 5.60 12/21/2020          Medications           Scheduled Medications amLODIPine, 5 mg, Oral, Daily  aspirin, 81 mg, Oral, Daily  atorvastatin, 10 mg, Oral, Daily  cefTRIAXone, 1 g, Intravenous, Q24H  fluticasone, 2 spray, Each Nare, Daily  ipratropium, 0.5 mg, Nebulization, 4x  Daily - RT  lactulose, 10 g, Oral, BID  montelukast, 10 mg, Oral, Q PM  sodium chloride, 10 mL, Intravenous, Q12H  sodium chloride, 10 mL, Intravenous, Q12H  sodium chloride, 10 mL, Intravenous, Q12H  sodium chloride, 10 mL, Intravenous, Q12H       Infusions sodium chloride 0.9 % with KCl 20 mEq, 100 mL/hr, Last Rate: 100 mL/hr (07/04/22 2222)       PRN Medications •  acetaminophen **OR** acetaminophen **OR** acetaminophen  •  albuterol  •  benzonatate  •  magnesium hydroxide  •  nitroglycerin  •  ondansetron  •  potassium chloride **OR** potassium chloride **OR** potassium chloride  •  potassium chloride **OR** potassium chloride **OR** potassium chloride  •  [COMPLETED] Insert peripheral IV **AND** sodium chloride  •  [COMPLETED] Insert peripheral IV **AND** sodium chloride  •  sodium chloride  •  sodium chloride  •  sodium chloride     Physical Findings        Physical Appearance confused, generalized wasting, loss of muscle mass, loss of subcutaneous fat     NFPE muscle wasting, moderate and subcutaneous fat loss, moderate   --  Edema  no edema   Gastrointestinal constipation-fecal impaction   Tubes/Drains none   Oral/Mouth Cavity WNL   Skin pressure injury   --  Current Nutrition Orders & Evaluation of Intake       Oral Nutrition     Food Allergies NKFA   Current PO Diet Diet Regular; Cardiac   Supplement n/a   PO Evaluation     Trending % PO Intake Fruit and coffee for breakfast       --  Nutrition Diagnosis        Nutrition Dx Problem 1 Problem: Malnutrition    Etiology: Functional Diagnosis    Signs/Symptoms: Report of Minimal PO Intake and BMI    Comment:        Intervention Goal        Intervention Goal(s) Provide information, Reduce/improve symptoms, Meet estimated needs and Increase intake     Nutrition Intervention        RD Action Interview for preferences, Menu provided, Supplement provided, Encourage intake, Follow Tx Progress and Care plan reviewed     Nutrition Prescription        Diet Prescription     Supplement Prescription Boost Plus, BID   Prescription Ordered    --  Monitor/Evaluation        Monitor Per protocol, I&O, PO intake, Supplement intake, Pertinent labs, Weight, Skin status, GI status, Symptoms     RD to follow per protocol.      Electronically signed by:  Reyna Obrien RD  07/05/22 11:49 EDT

## 2022-07-05 NOTE — DISCHARGE PLACEMENT REQUEST
"Sharnoda Palomino (89 y.o. Female)             Date of Birth   02/25/1930    Social Security Number       Address   [place] [id number]    Home Phone   [phone]    MRN   [id number]       Nondenominational   Church    Marital Status                               Admission Date   7/4/22    Admission Type   Emergency    Admitting Provider   Angi Rangel MD    Attending Provider   Ervin Polk MD    Department, Room/Bed   [address], [address]/1       Discharge Date       Discharge Disposition       Discharge Destination                               Attending Provider: Ervin Polk MD    Allergies: Codeine, Sulfa Antibiotics, Penicillins    Isolation: None   Infection: None   Code Status: No CPR   Advance Care Planning Activity    Ht: 162.6 cm (64\")   Wt: 39.9 kg (88 lb)    Admission Cmt: None   Principal Problem: Fecal impaction (HCC) [K56.41]                 Active Insurance as of 7/4/2022     Primary Coverage     Payor Plan Insurance Group Employer/Plan Group    MEDICARE MEDICARE A & B      Payor Plan Address Payor Plan Phone Number Payor Plan Fax Number Effective Dates    [address] [phone]  2/1/1995 - None Entered    [facility] [id number]       Subscriber Name Subscriber Birth Date Member ID       SHARONDA PALOMINO 2/25/1930 [id number]           Secondary Coverage     Payor Plan Insurance Group Employer/Plan Group    ANTHEM BLUE CROSS [facility] SUPP [id number]     Payor Plan Address Payor Plan Phone Number Payor Plan Fax Number Effective Dates    [address]   12/1/2016 - None Entered    [facility] [id number]       Subscriber Name Subscriber Birth Date Member ID       SHARONDA PALOMINO 2/25/1930 [id number]                 Emergency Contacts      (Rel.) Home Phone Work Phone Mobile Phone    GAYLECHIQUI LARSEN (Grandchild) [phone] -- --    Callie Wong (Grandchild) [phone] -- --    "

## 2022-07-05 NOTE — PLAN OF CARE
Goal Outcome Evaluation:  Plan of Care Reviewed With: patient        Progress: improving  Outcome Evaluation:   Pt arrived unit with present of nursing facility inserted midline line and F/C, insert date is 07/04/22 per report, but insertion time unknown. Notified [facility] and order obtained for line care, F/C care. CXR checked midline line placement; dsg changed on midline line, every port on the tubing has been cleaned and capped. F/C done; the bag attached to it does not have port to obtain urine sample, so bag replaced using the [facility] one.     Stage 1 pressure ulcer per report and pictures in chart, getting a lot better after putting cream on, red area showing inn the photo almost disappear around 0200 skin check.    Pt c/o abd pain and unable to have BM, [facility] paged and got pt's home med- milk of magnesia ordered PRN for constipation. Smear of stool noticed afterwards. Enema given per ER note.     VSS, will continue to monitor.

## 2022-07-05 NOTE — PLAN OF CARE
Goal Outcome Evaluation:              Outcome Evaluation: VSS. No BM this shift. Pt still having abdominal pain occasionally. Grand daughter at bedsied. Q2 turn. Low air loss mattress ordered. Ahmadi to be removed tomorrow. Continue abx. No other complaints, will conitnue to monitor.

## 2022-07-05 NOTE — CONSULTS
HISTORY AND PHYSICAL   [facility]        Date of Admission: 2022  Patient Identification:  Name: Sharonda Ridley  Age: 89 y.o.  Sex: female  :  1930  MRN: [id number]                     Primary Care Physician: Daniel Salvador APRN    Chief Complaint:  89 year old female who presented to the emergency room with constipation from the [place]; she has had some abdominal pain and distension; she has had some flatus; she has a prior history of the same and fecal impactions; she was sent to the observation unit but was very uncomfortable and crying with abnormal ct results so she was admitted; she has been seen by surgery and is now feeling much better after having passed some stool; no family is present    History of Present Illness:   As above    Past Medical History:  Past Medical History:   Diagnosis Date   • Arthritis    • Hyperlipidemia    • Hypertension    • Methicillin resistant Staphylococcus aureus infection      Past Surgical History:  Past Surgical History:   Procedure Laterality Date   • CATARACT EXTRACTION Left    • ORIF PROXIMAL HUMERUS FRACTURE        Home Meds:  Medications Prior to Admission   Medication Sig Dispense Refill Last Dose   • acetaminophen (TYLENOL) 325 MG tablet Take 650 mg by mouth Every 6 (Six) Hours As Needed for Mild Pain . NOT TO EXCEED 3000MG IN A 24 HOUR PERIOD      • albuterol (PROVENTIL) (2.5 MG/3ML) 0.083% nebulizer solution Take 2.5 mg by nebulization Every 4 (Four) Hours As Needed for Wheezing. 360 mL 12    • albuterol sulfate  (90 Base) MCG/ACT inhaler INHALE ONE TO TWO PUFFS BY MOUTH EVERY 4 HOURS AS NEEDED FOR WHEEZING OR SHORTNESS OF AIR 18 g 5    • albuterol sulfate  (90 Base) MCG/ACT inhaler Inhale 2 puffs Every 4 (Four) Hours As Needed for Wheezing. 6.7 g 0    • amLODIPine (NORVASC) 5 MG tablet Take 1 tablet by mouth Daily. 90 tablet 1    • aspirin 81 MG EC tablet Take 81 mg by mouth Daily.      • atorvastatin (LIPITOR) 10 MG  tablet Take 1 tablet by mouth Daily. 90 tablet 2    • benzonatate (TESSALON) 200 MG capsule Take 1 capsule by mouth 3 (Three) Times a Day As Needed for Cough. 15 capsule 0    • fexofenadine (ALLEGRA) 180 MG tablet Take 1 tablet by mouth Daily.      • fluticasone (FLONASE) 50 MCG/ACT nasal spray SPRAY 2 SPRAYS IN EACH NOSTRIL DAILY 16 mL 5    • magnesium hydroxide (MILK OF MAGNESIA) 400 MG/5ML suspension Take  by mouth Daily As Needed for Constipation.      • montelukast (SINGULAIR) 10 MG tablet Take 1 tablet by mouth Every Evening. 90 tablet 3    • predniSONE (DELTASONE) 10 MG tablet Take 1 tablet by mouth Take As Directed. 30 tablet 0    • Spacer/Aero-Holding Chambers (OptiChamber Marnie) misc USE DEVICE WITH INHALER EVERY 4 HOURS AS NEEDED FOR SHORTNESS OF BREATH/WHEEZING 1 each 3    • Umeclidinium Bromide (Incruse Ellipta) 62.5 MCG/INH aerosol powder  Inhale 1 puff Daily. 90 each 3    • VITAMIN D, CHOLECALCIFEROL, PO Take  by mouth.          Allergies:  Allergies   Allergen Reactions   • Codeine Itching   • Sulfa Antibiotics Arrhythmia   • Penicillins Rash     Immunizations:  Immunization History   Administered Date(s) Administered   • COVID-19 (PFIZER) PURPLE CAP 08/19/2021, 09/07/2021, 03/15/2022     Social History:   Social History     Social History Narrative   • Not on file     Social History     Socioeconomic History   • Marital status:    Tobacco Use   • Smoking status: Light Tobacco Smoker     Packs/day: 0.25     Years: 15.00     Pack years: 3.75     Types: Cigarettes   • Smokeless tobacco: Never Used   Vaping Use   • Vaping Use: Never used   Substance and Sexual Activity   • Alcohol use: Not Currently   • Drug use: Never   • Sexual activity: Defer       Family History:  Family History   Problem Relation Age of Onset   • Stroke Mother    • Heart valve disorder Father    • Cancer Other         sibling        Review of Systems  Not obtainable from the patient due to forgetfulness     Objective:  T  "Max 24 hrs: Temp (24hrs), Av.8 °F (36.6 °C), Min:97.2 °F (36.2 °C), Max:98.6 °F (37 °C)    Vitals Ranges:   Temp:  [97.2 °F (36.2 °C)-98.6 °F (37 °C)] 97.7 °F (36.5 °C)  Heart Rate:  [80-93] 80  Resp:  [16-18] 18  BP: (119-155)/(73-93) 144/73      Exam:  /73 (BP Location: Left arm, Patient Position: Lying)   Pulse 80   Temp 97.7 °F (36.5 °C) (Oral)   Resp 18   Ht 162.6 cm (64\")   Wt 39.9 kg (88 lb)   LMP  (LMP Unknown)   SpO2 96%   BMI 15.11 kg/m²     General Appearance:    Alert, cooperative, no distress, appears stated age   Head:    Normocephalic, without obvious abnormality, atraumatic   Eyes:    PERRL, conjunctivae/corneas clear, EOM's intact, both eyes   Ears:    Normal external ear canals, both ears   Nose:   Nares normal, septum midline, mucosa normal, no drainage    or sinus tenderness   Throat:   Lips, mucosa, and tongue normal   Neck:   Supple, symmetrical, trachea midline, no adenopathy;     thyroid:  no enlargement/tenderness/nodules; no carotid    bruit or JVD   Back:     Symmetric, no curvature, ROM normal, no CVA tenderness   Lungs:     Decreased breath sounds bilaterally, respirations unlabored   Chest Wall:    No tenderness or deformity    Heart:    Regular rate and rhythm, S1 and S2 normal, no murmur, rub   or gallop   Abdomen:     Soft, nontender, bowel sounds active all four quadrants,     no masses, no hepatomegaly, no splenomegaly   Extremities:   Extremities normal, atraumatic, no cyanosis or edema   Pulses:   2+ and symmetric all extremities   Skin:   Skin color, texture, turgor normal, no rashes or lesions   Lymph nodes:   Cervical, supraclavicular, and axillary nodes normal   Neurologic:   CNII-XII intact, normal strength, sensation intact throughout      .    Data Review:  Labs in chart were reviewed.  WBC   Date Value Ref Range Status   2022 8.06 3.40 - 10.80 10*3/mm3 Final     Hemoglobin   Date Value Ref Range Status   2022 12.2 12.0 - 15.9 g/dL Final "     Hematocrit   Date Value Ref Range Status   07/04/2022 37.2 34.0 - 46.6 % Final     Platelets   Date Value Ref Range Status   07/04/2022 297 140 - 450 10*3/mm3 Final     Sodium   Date Value Ref Range Status   07/04/2022 142 136 - 145 mmol/L Final     Potassium   Date Value Ref Range Status   07/04/2022 3.0 (L) 3.5 - 5.2 mmol/L Final     Chloride   Date Value Ref Range Status   07/04/2022 103 98 - 107 mmol/L Final     CO2   Date Value Ref Range Status   07/04/2022 28.1 22.0 - 29.0 mmol/L Final     BUN   Date Value Ref Range Status   07/04/2022 24 (H) 8 - 23 mg/dL Final     Creatinine   Date Value Ref Range Status   07/04/2022 0.67 0.57 - 1.00 mg/dL Final     Glucose   Date Value Ref Range Status   07/04/2022 95 65 - 99 mg/dL Final     Calcium   Date Value Ref Range Status   07/04/2022 8.1 (L) 8.2 - 9.6 mg/dL Final     Magnesium   Date Value Ref Range Status   07/04/2022 2.9 (H) 1.7 - 2.3 mg/dL Final     AST (SGOT)   Date Value Ref Range Status   07/04/2022 23 1 - 32 U/L Final     ALT (SGPT)   Date Value Ref Range Status   07/04/2022 25 1 - 33 U/L Final     Alkaline Phosphatase   Date Value Ref Range Status   07/04/2022 69 39 - 117 U/L Final                Imaging Results (All)     Procedure Component Value Units Date/Time    CT Abdomen Pelvis Without Contrast [[id number]] Collected: 07/04/22 1504     Updated: 07/04/22 1515    Narrative:      CT ABDOMEN PELVIS WO CONTRAST-     HISTORY:  Constipation.      TECHNIQUE:  CT of the abdomen and pelvis was performed without  intravenous contrast.  Evaluation of the solid organs and vasculature is  limited without intravenous contrast.  Radiation dose reduction  techniques were utilized, including automated exposure control and  exposure modulation based on body size.     COMPARISON:  [facility] 06/29/2022     FINDINGS:     Heart size is normal. There is no pericardial effusion. There is mild  bibasilar atelectasis and/or scarring with small left and trace right  pleural  effusions.  The liver is normal in size. A few hypodense foci in the liver are too  small to accurately characterize. The gallbladder is present. The spleen  is normal in size. There are no pancreatic calcifications. There is mild  thickening of the adrenal glands. There is no hydronephrosis.  There is marked, diffuse calcific aortoiliac atherosclerosis.  There is marked rectal distention with inspissated stool, corresponding  wall thickening, and moderate adjacent fat stranding. The colon is  distended proximally with multiple air-fluid levels. The GI tract is  difficult to fully assess without intravenous and oral contrast and due  to paucity of intra-abdominal and pelvic fat, however, there are likely  also at least mildly dilated small bowel loops. There is a small  fluid-filled hiatal hernia. The appendix is present and normal in size.  The bladder is collapsed around a Ahmadi catheter. There are calcified  uterine fibroids. There is pelvic floor laxity.  There is partially imaged surgical hardware in the proximal left femur.  There is diffuse osseous demineralization. There is multilevel  degenerative disc disease. 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          Impression:         1.  Large rectal distention with inspissated stool and corresponding  wall thickening and adjacent fat stranding, concerning for fecal  impaction with possible stercoral colitis. Dilated colon and small bowel  with fluid levels proximally are likely due to distal obstruction from  impaction.  2.  Small fluid-filled hiatal hernia.  3.  Small left and trace right pleural effusions.     Discussed with Dr. Ferraro at 3:12 PM.     This report was finalized on 7/4/2022 3:12 PM by Dr. Yi Dueñas M.D.               Assessment:  Active Hospital Problems    Diagnosis  POA   • **Fecal impaction (HCC) [K56.41]  Yes   • Hypokalemia [E87.6]  Yes   • Failure to thrive in adult [R62.7]  Yes   • COPD (chronic obstructive pulmonary disease) (HCC)  [J44.9]  Yes   • Mixed hyperlipidemia [E78.2]  Yes   • HTN (hypertension) [I10]  Yes      Resolved Hospital Problems   No resolved problems to display.       Plan:  Replace potassium  Appreciate help from surgery  Monitor on telemetry  Palliative care consult has been ordered  Enemas to be done  D.w patient and provider from the observation unit  Thanks for involving us in her care    Angi Rangel MD  7/4/2022  21:24 EDT

## 2022-07-05 NOTE — SIGNIFICANT NOTE
07/05/22 1617   OTHER   Discipline physical therapist   Rehab Time/Intention   Session Not Performed patient/family declined evaluation   Recommendation   PT - Next Appointment 07/06/22

## 2022-07-05 NOTE — PROGRESS NOTES
Name: Sharonda Ridley ADMIT: 2022   : 1930  PCP: Daniel Salvador APRN    MRN: [id number] LOS: 1 days   AGE/SEX: 89 y.o. female  ROOM: [facility]     Subjective   Subjective   No new complaints. Only very small BM since last night  No nausea or vomiting    Review of Systems     Objective   Objective   Vital Signs  Temp:  [97.2 °F (36.2 °C)-97.8 °F (36.6 °C)] 97.2 °F (36.2 °C)  Heart Rate:  [68-88] 88  Resp:  [18] 18  BP: ()/(55-73) 98/55  SpO2:  [89 %-96 %] 89 %  on  Flow (L/min):  [2] 2;   Device (Oxygen Therapy): room air  Body mass index is 15.11 kg/m².  Physical Exam  Vitals and nursing note reviewed.   Constitutional:       Appearance: She is not ill-appearing.   Cardiovascular:      Rate and Rhythm: Normal rate and regular rhythm.      Heart sounds: No murmur heard.  Pulmonary:      Effort: No respiratory distress.      Breath sounds: Normal breath sounds. No wheezing.   Abdominal:      General: Bowel sounds are normal. There is no distension.      Palpations: Abdomen is soft.      Tenderness: There is abdominal tenderness (mild).   Musculoskeletal:      Right lower leg: No edema.      Left lower leg: No edema.   Skin:     General: Skin is warm and dry.      Findings: No rash.   Neurological:      Mental Status: She is alert and oriented to person, place, and time.   Psychiatric:         Mood and Affect: Mood normal.         Thought Content: Thought content normal.         Results Review     I reviewed the patient's new clinical results.  Results from last 7 days   Lab Units 22  0550 22  1352 22  17122  0555   WBC 10*3/mm3 8.36 8.06 7.32 9.21   HEMOGLOBIN g/dL 11.8* 12.2 11.7* 11.8*   PLATELETS 10*3/mm3 272 297 305 351     Results from last 7 days   Lab Units 22  0550 22  1352 22  1715 22  0555   SODIUM mmol/L 136 142 137 133*   POTASSIUM mmol/L 4.2 3.0* 3.4* 3.9   CHLORIDE mmol/L 99 103 99 95*   CO2 mmol/L 27.0 28.1 28.9 29.4*   BUN mg/dL 21  24* 27* 26*   CREATININE mg/dL 0.63 0.67 0.64 0.66   GLUCOSE mg/dL 80 95 78 93   EGFR mL/min/1.73 83.3 82.1 83.0 82.4     Results from last 7 days   Lab Units 07/04/22  1352 07/03/22  1715 06/30/22  0555 06/29/22  0821   ALBUMIN g/dL 3.00* 3.00* 3.20* 3.60   BILIRUBIN mg/dL 0.3 0.4 0.4 0.6   ALK PHOS U/L 69 64 61 73   AST (SGOT) U/L 23 20 17 20   ALT (SGPT) U/L 25 24 22 24     Results from last 7 days   Lab Units 07/05/22  0550 07/04/22  1352 07/03/22 1715 06/30/22  0555 06/29/22  0821   CALCIUM mg/dL 7.7* 8.1* 8.0* 8.4 8.9   ALBUMIN g/dL  --  3.00* 3.00* 3.20* 3.60   MAGNESIUM mg/dL  --  2.9*  --   --   --        No results found for: HGBA1C, POCGLU    CT Abdomen Pelvis Without Contrast    Result Date: 7/4/2022   1.  Large rectal distention with inspissated stool and corresponding wall thickening and adjacent fat stranding, concerning for fecal impaction with possible stercoral colitis. Dilated colon and small bowel with fluid levels proximally are likely due to distal obstruction from impaction. 2.  Small fluid-filled hiatal hernia. 3.  Small left and trace right pleural effusions.  Discussed with Dr. Ferraro at 3:12 PM.  This report was finalized on 7/4/2022 3:12 PM by Dr. Yi uDeñas M.D.      XR Chest 1 View    Result Date: 7/5/2022  Electronically signed by Moose Hong MD on 07-05-22 at 0158    Scheduled Medications  amLODIPine, 5 mg, Oral, Daily  aspirin, 81 mg, Oral, Daily  atorvastatin, 10 mg, Oral, Daily  cefTRIAXone, 1 g, Intravenous, Q24H  fluticasone, 2 spray, Each Nare, Daily  ipratropium, 0.5 mg, Nebulization, 4x Daily - RT  lactulose, 10 g, Oral, BID  magnesium hydroxide, 10 mL, Oral, BID  montelukast, 10 mg, Oral, Q PM  polyethylene glycol, 17 g, Oral, Daily  sodium chloride, 10 mL, Intravenous, Q12H  sodium chloride, 10 mL, Intravenous, Q12H  sodium chloride, 10 mL, Intravenous, Q12H  sodium chloride, 10 mL, Intravenous, Q12H    Infusions  sodium chloride 0.9 % with KCl 20 mEq, 100 mL/hr,  Last Rate: 100 mL/hr (07/04/22 2223)    Diet  Diet Regular; Cardiac       Assessment/Plan     Active Hospital Problems    Diagnosis  POA   • **Fecal impaction (HCC) [K56.41]  Yes   • UTI (urinary tract infection), bacterial [N39.0, A49.9]  Unknown   • Urine retention [R33.9]  Yes   • Failure to thrive in adult [R62.7]  Yes   • COPD (chronic obstructive pulmonary disease) (HCC) [J44.9]  Yes   • Mixed hyperlipidemia [E78.2]  Yes   • HTN (hypertension) [I10]  Yes      Resolved Hospital Problems    Diagnosis Date Resolved POA   • Hypokalemia [E87.6] 07/05/2022 Yes       89 y.o. female admitted with severe constipation/fecal impaction, poor oral intake since recent discharge from this facility last week.    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  - We will add lactulose twice daily to patient's home med of milk of magnesia  - Surgery following but does not feel she truly has fecal impaction or stercoral colitis  - Abdominal exam remains fairly benign  - Continue gentle hydration    Urinary retention likely related to above.  Continue Ahmadi catheter until good bowel movements are obtained at which time we could do a voiding trial    UTI-we will obtain culture.  Started Rocephin    COPD-seems stable despite recent exacerbation.  Continue current meds.  She is on 2 L O2      · SCDs for DVT prophylaxis.  · DNR status.  · Disposition: Likely back to SNF when ready, 1 to 2 days  · Discussed with granddaughter at bedside      Ervin Polk MD  [facility]  07/05/22  15:25 EDT

## 2022-07-06 PROBLEM — E44.0 MODERATE MALNUTRITION (HCC): Status: ACTIVE | Noted: 2022-01-01

## 2022-07-06 PROBLEM — K56.41 FECAL IMPACTION (HCC): Status: RESOLVED | Noted: 2022-01-01 | Resolved: 2022-01-01

## 2022-07-06 NOTE — PROGRESS NOTES
Chief Complaint:    Constipation    Subjective:    The patient is feeling well today.  She does not have any abdominal complaints.  She had a large bowel movement this morning.    Objective:    Temp:  [97.2 °F (36.2 °C)-98.3 °F (36.8 °C)] 97.4 °F (36.3 °C)  Heart Rate:  [74-88] 76  Resp:  [16-18] 18  BP: ()/(55-74) 153/74    Physical Exam  Constitutional:       Appearance: She is not ill-appearing or toxic-appearing.   Abdominal:      Palpations: Abdomen is soft.      Tenderness: There is no abdominal tenderness.      Comments: The abdomen is soft and nontender.  It is moderately distended but this is likely her baseline.  There are bowel sounds.   Neurological:      Mental Status: She is alert.   Psychiatric:         Behavior: Behavior is cooperative.         Results:    There are no new labs this morning.    Assessment/Plan:    The patient had constipation that seemed partially caused by anal stenosis.  The anus was dilated with digital rectal exam and she is now having improved bowel function.  I agree with the present bowel regimen.  She is ready for transfer back to her skilled nursing facility from a surgical standpoint.    David Williamson Jr., M.D.

## 2022-07-06 NOTE — CONSULTS
Purpose of the visit was to evaluate for: goals of care/advanced care planning, support for patient/family and hospice referral/discussion. Spoke with RN as well as family and discussed palliative care, goals of care and [facility].      Assessment:  Patient is palliative care appropriate given moderate malnutrition, failure to thrive, UTI, constipation, COPD, recurrent hospitalizations. PPS: 30%.     Recommendations/Plan: Provided information about palliative care services and hospice services. Plan to d/c back to [facility] today. Will sign off for now. Please re-consult if patient's goals or needs change. Thank you for the referral.     Other Comments:   Spoke to patient's granddgtr, Amy, outside of room to provide explanation of palliative and hospice services per family request. Granddgtr discussed progressive decline with patient over the last few months. She noted that patient had been living independently prior to recent hospitalization and rehab placement. She processed that patient has had more frequent hospitalizations, acute issues, and increased weakness. She and her sister Callie are wanting to be proactive and have information to get appropriate services for patient when she shows further decline.     Provided information about outpatient palliative care services ([facility]) vs inpatient palliative care at [facility]. Discussed option of [facility] at facility vs inpatient hospice depending on patient's needs. Provided education about comfort care and symptom management. Provided granddgtr with brochures for [facility] and [facility] as well as  palliative care contact information if they need additional support with [facility].

## 2022-07-06 NOTE — NURSING NOTE
Large BM this morning at around 0630. Pt has no pain at all afterwards. Pleasant and alert. Midline dsg loose, site cleaned and dsg changed. Line flushed, blood return noticed and capped.

## 2022-07-06 NOTE — PLAN OF CARE
Goal Outcome Evaluation:  Plan of Care Reviewed With: patient           Outcome Evaluation: Pt is a 89 y/o F admitted to [facility] from [place] d/t constipation. Prior to rehab, was (I) at home alone, 3 KWAME, no AD, and uses rollator at baseline. Pt agreeable to work with PT today, although states she does not want to get OOB. Pt performs bed mobility with SBA, able to stand with CGA to RW, and req's Anselmo to take a few side steps towards HOB. Pt demo's posterior lean and difficulty with weight shift when trying to ambulate. Deficits include dec'd strength, endurance, and balance. Pt will benefit from skilled PT intervention. Rec D/C back to SNF.

## 2022-07-06 NOTE — PLAN OF CARE
Goal Outcome Evaluation:  Plan of Care Reviewed With: patient        Progress: improving  Outcome Evaluation: vitals stable. several large BMs today. Voiding trail - failed - bhardwaj replaced. pt discharged to SNF - wheelchair van  at 1600. Midline removed.

## 2022-07-06 NOTE — PROGRESS NOTES
Continued Stay Note  Psychiatric     Patient Name: Sharonda Ridley  MRN: [id number]  Today's Date: 7/6/2022    Admit Date: 7/4/2022     Discharge Plan     Row Name 07/06/22 1341       Plan    Plan [facility] Skilled    Plan Comments Pt with orders to return back to [facility] today for skilled level of care.  Pt's granddaughter at bedside and is agreeable to pt transferring back.  Pt will transporting via [facility] today at 4:00.  Pt's transfer packet provided to pt's RN for completion.               Discharge Codes    No documentation.               Expected Discharge Date and Time     Expected Discharge Date Expected Discharge Time    Jul 6, 2022             BELÉN Rogers

## 2022-07-06 NOTE — PROGRESS NOTES
Case Management Discharge Note      Final Note: Dc to [facility] skilled levle of care    Provided Post Acute Provider List?: N/A  Provided Post Acute Provider Quality & Resource List?: N/A    Selected Continued Care - Discharged on 7/6/2022 Admission date: 7/4/2022 - Discharge disposition: Skilled Nursing Facility (DC - External)    Destination Coordination complete.    Service Provider Selected Services Address Phone Fax Patient Preferred    [facility]  Skilled Nursing [address] [place] [id number] [phone] --          Durable Medical Equipment    No services have been selected for the patient.              Dialysis/Infusion    No services have been selected for the patient.              Home Medical Care    No services have been selected for the patient.              Therapy    No services have been selected for the patient.              Community Resources    No services have been selected for the patient.              Community & DME    No services have been selected for the patient.                Selected Continued Care - Episodes Includes selections from active Coordinated Care Management episodes    High Risk Care Management Episode start date: 6/17/2022   There are no active outsourced providers for this episode.             Selected Continued Care - Prior Encounters Includes selections from prior encounters from 4/5/2022 to 7/6/2022    Discharged on 6/30/2022 Admission date: 6/24/2022 - Discharge disposition: Rehab Facility or Unit (DC - External)    Destination     Service Provider Selected Services Address Phone Fax Patient Preferred    [facility]  Skilled Nursing [address] [place] [id number] [phone] --                         Final Discharge Disposition Code: 03 - skilled nursing facility (SNF)

## 2022-07-06 NOTE — DISCHARGE SUMMARY
Patient Name: Sharonda Ridley  : 1930  MRN: [id number]    Date of Admission: 2022  Date of Discharge:  2022  Primary Care Physician: Daniel Salvador APRN      Chief Complaint:   Constipation and abd distention      Discharge Diagnoses     Active Hospital Problems    Diagnosis  POA   • Moderate malnutrition (HCC) [E44.0]  Yes   • UTI (urinary tract infection), bacterial [N39.0, A49.9]  Yes   • Urine retention [R33.9]  Yes   • Failure to thrive in adult [R62.7]  Yes   • COPD (chronic obstructive pulmonary disease) (HCC) [J44.9]  Yes   • Mixed hyperlipidemia [E78.2]  Yes   • HTN (hypertension) [I10]  Yes      Resolved Hospital Problems    Diagnosis Date Resolved POA   • **Fecal impaction (HCC) [K56.41] 2022 Yes   • Hypokalemia [E87.6] 2022 Yes        Hospital Course     Ms. Ridley is a 89 y.o. female with a history of hypertension and hyperlipidemia, recent admission with COPD exacerbation, who presented to [facility] initially complaining of constipation and abdominal distention.  Please see the admitting history and physical for further details.  She had not been eating well since her discharge from the hospital.  She actually had a midline put in at the facility and they were giving her some fluids.  She also had been experiencing urine retention and had a Ahmadi catheter placed before being transferred to the ED here.  CT scan here revealed fecal impaction and possible stercoral colitis.  She was seen by general surgery who felt that she had simple constipation related to anal stenosis.  Digital rectal exam and disimpaction was done with some dilation of the anal sphincter.  She was started on a laxative regimen.  This morning she has had numerous large bowel movements per staff and is feeling better.  She still does not have a lot of appetite but blames that on the hospital food.  She says she only wants to do liquids and pudding for now.  She should be stable for  discharge.  I am going to discharge her with a couple more days of antibiotic as she did appear to have a UTI on admission.  Her urine culture is growing a gram-negative мария, not yet identified.  We have removed her Ahmadi catheter and are attempting a voiding trial now that her fecal impaction has been resolved.  If she is unable to void will replace Ahmadi for discharge.    Family has brought up the desire to discuss palliative care though they seem reluctant to talk about this with her grandmother.  We have asked for palliative to consult today prior to discharge.  I think their primary concern is more about long-term goals and the fact that she has declined significantly over the last several weeks.  We did talk about CODE STATUS and she is DNR.      Day of Discharge     Subjective:  No complaints.  Seems a little confused but in a good mood and without abdominal issues    Physical Exam:  Temp:  [97.2 °F (36.2 °C)-98.3 °F (36.8 °C)] 97.4 °F (36.3 °C)  Heart Rate:  [74-88] 76  Resp:  [16-18] 18  BP: ()/(55-74) 153/74  Body mass index is 15.11 kg/m².  Physical Exam  Vitals and nursing note reviewed.   Constitutional:       Appearance: She is not ill-appearing.   Cardiovascular:      Rate and Rhythm: Normal rate and regular rhythm.      Heart sounds: No murmur heard.  Pulmonary:      Effort: No respiratory distress.      Breath sounds: Normal breath sounds. No wheezing.   Abdominal:      General: Bowel sounds are normal. There is no distension.      Palpations: Abdomen is soft.      Tenderness: There is no abdominal tenderness.   Musculoskeletal:      Right lower leg: No edema.      Left lower leg: No edema.   Skin:     General: Skin is warm and dry.      Findings: No rash.   Neurological:      Mental Status: She is alert.   Psychiatric:         Mood and Affect: Mood normal.         Thought Content: Thought content normal.         Consultants     Consult Orders (all) (From admission, onward)     Start      Ordered    07/06/22 1206  Inpatient Palliative Care Nurse Consult  Once        Provider:  (Not yet assigned)    07/06/22 1205    07/05/22 0742  Inpatient Palliative Care Nurse Consult  Once        Provider:  (Not yet assigned)    07/05/22 0741    07/05/22 0000  Inpatient Palliative Care MD Consult  Once,   Status:  Canceled        Specialty:  Hospice and Palliative Medicine  Provider:  (Not yet assigned)    07/04/22 2051 07/05/22 0000  Inpatient Nutrition Consult  Once        Provider:  (Not yet assigned)    07/04/22 2051 07/05/22 0000  Inpatient Case Management  Consult  Once        Provider:  (Not yet assigned)    07/04/22 2051 07/04/22 1651  Inpatient General Surgery Consult  Once        Specialty:  General Surgery  Provider:  David Williamson Jr., MD    07/04/22 1650 07/04/22 1644  Inpatient Hospitalist Consult  Once        Specialty:  Hospitalist  Provider:  Angi Rangel MD    07/04/22 1643              Procedures       Imaging Results (All)     Procedure Component Value Units Date/Time    XR Chest 1 View [[id number]] Collected: 07/05/22 0159     Updated: 07/05/22 0159    Narrative:        Patient: TAMIKO PALOMINO  Time Out: 01:58  Exam(s): FILM CXR 1 VIEW     EXAM:    XR Chest, 1 View    CLINICAL HISTORY:     Reason for exam: PICC line placement.    TECHNIQUE:    Frontal view of the chest.    COMPARISON:    6 25 2022    FINDINGS:    Lungs:  Interstitial opacities bilaterally potentially representing   edema.  Atelectasis in the left lung.    Pleural space:  No pleural effusion. No pneumothorax.    Heart:  Unremarkable.  No cardiomegaly.    Tubes, lines and devices:  Right upper extremity midline catheter   terminates within the right subclavian vein.    IMPRESSION:       1.  Right upper extremity midline catheter terminates within the right   subclavian vein.  2.  Interstitial opacities bilaterally potentially representing edema.  3.  Atelectasis in the left lung.       Impression:          Electronically signed by Moose Hong MD on 07-05-22 at 0158    CT Abdomen Pelvis Without Contrast [[id number]] Collected: 07/04/22 1504     Updated: 07/04/22 1515    Narrative:      CT ABDOMEN PELVIS WO CONTRAST-     HISTORY:  Constipation.      TECHNIQUE:  CT of the abdomen and pelvis was performed without  intravenous contrast.  Evaluation of the solid organs and vasculature is  limited without intravenous contrast.  Radiation dose reduction  techniques were utilized, including automated exposure control and  exposure modulation based on body size.     COMPARISON:  [facility] 06/29/2022     FINDINGS:     Heart size is normal. There is no pericardial effusion. There is mild  bibasilar atelectasis and/or scarring with small left and trace right  pleural effusions.  The liver is normal in size. A few hypodense foci in the liver are too  small to accurately characterize. The gallbladder is present. The spleen  is normal in size. There are no pancreatic calcifications. There is mild  thickening of the adrenal glands. There is no hydronephrosis.  There is marked, diffuse calcific aortoiliac atherosclerosis.  There is marked rectal distention with inspissated stool, corresponding  wall thickening, and moderate adjacent fat stranding. The colon is  distended proximally with multiple air-fluid levels. The GI tract is  difficult to fully assess without intravenous and oral contrast and due  to paucity of intra-abdominal and pelvic fat, however, there are likely  also at least mildly dilated small bowel loops. There is a small  fluid-filled hiatal hernia. The appendix is present and normal in size.  The bladder is collapsed around a Ahmadi catheter. There are calcified  uterine fibroids. There is pelvic floor laxity.  There is partially imaged surgical hardware in the proximal left femur.  There is diffuse osseous demineralization. There is multilevel  degenerative disc disease. There are bilateral pars  defects at L5.          Impression:         1.  Large rectal distention with inspissated stool and corresponding  wall thickening and adjacent fat stranding, concerning for fecal  impaction with possible stercoral colitis. Dilated colon and small bowel  with fluid levels proximally are likely due to distal obstruction from  impaction.  2.  Small fluid-filled hiatal hernia.  3.  Small left and trace right pleural effusions.     Discussed with Dr. Ferraro at 3:12 PM.     This report was finalized on 7/4/2022 3:12 PM by Dr. Yi Dueñas M.D.               Pertinent Labs     Results from last 7 days   Lab Units 07/05/22  0550 07/04/22  1352 07/03/22 1715 06/30/22  0555   WBC 10*3/mm3 8.36 8.06 7.32 9.21   HEMOGLOBIN g/dL 11.8* 12.2 11.7* 11.8*   PLATELETS 10*3/mm3 272 297 305 351     Results from last 7 days   Lab Units 07/05/22  0550 07/04/22  1352 07/03/22 1715 06/30/22  0555   SODIUM mmol/L 136 142 137 133*   POTASSIUM mmol/L 4.2 3.0* 3.4* 3.9   CHLORIDE mmol/L 99 103 99 95*   CO2 mmol/L 27.0 28.1 28.9 29.4*   BUN mg/dL 21 24* 27* 26*   CREATININE mg/dL 0.63 0.67 0.64 0.66   GLUCOSE mg/dL 80 95 78 93   EGFR mL/min/1.73 83.3 82.1 83.0 82.4     Results from last 7 days   Lab Units 07/04/22  1352 07/03/22 1715 06/30/22  0555   ALBUMIN g/dL 3.00* 3.00* 3.20*   BILIRUBIN mg/dL 0.3 0.4 0.4   ALK PHOS U/L 69 64 61   AST (SGOT) U/L 23 20 17   ALT (SGPT) U/L 25 24 22     Results from last 7 days   Lab Units 07/05/22  0550 07/04/22  1352 07/03/22 1715 06/30/22  0555   CALCIUM mg/dL 7.7* 8.1* 8.0* 8.4   ALBUMIN g/dL  --  3.00* 3.00* 3.20*   MAGNESIUM mg/dL  --  2.9*  --   --      Results from last 7 days   Lab Units 07/04/22  1352   LIPASE U/L 24             Invalid input(s): LDLCALC  Results from last 7 days   Lab Units 07/05/22  0501   URINECX  >100,000 CFU/mL Gram Negative Bacilli*     Results from last 7 days   Lab Units 07/04/22  1354   COVID19  Not Detected       Test Results Pending at Discharge     Pending Labs      Order Current Status    Urine Culture - Urine, Urine, Catheter Preliminary result          Discharge Details        Discharge Medications      New Medications      Instructions Start Date   cefdinir 300 MG capsule  Commonly known as: OMNICEF   300 mg, Oral, 2 Times Daily      magnesium hydroxide 2400 MG/10ML suspension suspension  Commonly known as: MILK OF MAGNESIA  Replaces: magnesium hydroxide 400 MG/5ML suspension   10 mL, Oral, 2 Times Daily      polyethylene glycol 17 g packet  Commonly known as: MIRALAX   17 g, Oral, Daily   Start Date: July 7, 2022        Continue These Medications      Instructions Start Date   acetaminophen 325 MG tablet  Commonly known as: TYLENOL   650 mg, Oral, Every 6 Hours PRN, NOT TO EXCEED 3000MG IN A 24 HOUR PERIOD      albuterol sulfate  (90 Base) MCG/ACT inhaler  Commonly known as: PROVENTIL HFA;VENTOLIN HFA;PROAIR HFA   INHALE ONE TO TWO PUFFS BY MOUTH EVERY 4 HOURS AS NEEDED FOR WHEEZING OR SHORTNESS OF AIR      albuterol sulfate  (90 Base) MCG/ACT inhaler  Commonly known as: PROVENTIL HFA;VENTOLIN HFA;PROAIR HFA   2 puffs, Inhalation, Every 4 Hours PRN      albuterol (2.5 MG/3ML) 0.083% nebulizer solution  Commonly known as: PROVENTIL   2.5 mg, Nebulization, Every 4 Hours PRN      amLODIPine 5 MG tablet  Commonly known as: NORVASC   5 mg, Oral, Daily      aspirin 81 MG EC tablet   81 mg, Oral, Daily      atorvastatin 10 MG tablet  Commonly known as: LIPITOR   10 mg, Oral, Daily      benzonatate 200 MG capsule  Commonly known as: TESSALON   200 mg, Oral, 3 Times Daily PRN      fluticasone 50 MCG/ACT nasal spray  Commonly known as: FLONASE   SPRAY 2 SPRAYS IN EACH NOSTRIL DAILY      Incruse Ellipta 62.5 MCG/INH aerosol powder   Generic drug: Umeclidinium Bromide   1 puff, Inhalation, Daily      montelukast 10 MG tablet  Commonly known as: SINGULAIR   10 mg, Oral, Every Evening      Fior guevara   USE DEVICE WITH INHALER EVERY 4 HOURS AS NEEDED FOR  SHORTNESS OF BREATH/WHEEZING      VITAMIN D (CHOLECALCIFEROL) PO   Oral         Stop These Medications    fexofenadine 180 MG tablet  Commonly known as: ALLEGRA     magnesium hydroxide 400 MG/5ML suspension  Commonly known as: MILK OF MAGNESIA  Replaced by: magnesium hydroxide 2400 MG/10ML suspension suspension     predniSONE 10 MG tablet  Commonly known as: DELTASONE            Allergies   Allergen Reactions   • Codeine Itching   • Sulfa Antibiotics Arrhythmia   • Penicillins Rash       Discharge Disposition:  Skilled Nursing Facility (DC - External)      Discharge Diet:  Diet Order   Procedures   • Diet Regular; Cardiac       Discharge Activity:       CODE STATUS:    Code Status and Medical Interventions:   Ordered at: 07/04/22 2124     Medical Intervention Limits:    NO intubation (DNI)     Code Status (Patient has no pulse and is not breathing):    No CPR (Do Not Attempt to Resuscitate)     Medical Interventions (Patient has pulse or is breathing):    Limited Support       Future Appointments   Date Time Provider Department Center   10/6/2022  2:45 PM Daniel Salvador APRN [facility]  [facility]      Follow-up Information     Daniel Salvador APRN Follow up in 2 week(s).    Specialty: Internal Medicine  Contact information:  [address] [place] [address]  [phone]                         Time Spent on Discharge:  Greater than 30 minutes      Ervin Polk MD  [facility]  07/06/22  13:01 EDT

## 2022-07-06 NOTE — THERAPY EVALUATION
Patient Name: Sharonda Ridley  : 1930    MRN: [id number]                              Today's Date: 2022       Admit Date: 2022    Visit Dx:     ICD-10-CM ICD-9-CM   1. Fecal impaction in rectum (HCC)  K56.41 560.32   2. Hypokalemia  E87.6 276.8     Patient Active Problem List   Diagnosis   • Breathlessness on exertion   • HTN (hypertension)   • Anxiety   • Long-term use of high-risk medication   • Seasonal allergic rhinitis due to pollen   • Mixed hyperlipidemia   • Carotid stenosis, asymptomatic, bilateral   • Hyperglycemia   • Hyponatremia   • Closed wedge compression fracture of T4 vertebra (HCC)   • COPD (chronic obstructive pulmonary disease) (HCC)   • Acute respiratory failure with hypoxia (HCC)   • Fecal impaction (HCC)   • Fecal impaction in rectum (HCC)   • Failure to thrive in adult   • UTI (urinary tract infection), bacterial   • Urine retention   • Moderate malnutrition (HCC)     Past Medical History:   Diagnosis Date   • Arthritis    • Hyperlipidemia    • Hypertension    • Methicillin resistant Staphylococcus aureus infection      Past Surgical History:   Procedure Laterality Date   • CATARACT EXTRACTION Left    • ORIF PROXIMAL HUMERUS FRACTURE        General Information     Row Name 22 1155          Physical Therapy Time and Intention    Document Type evaluation  -DB     Mode of Treatment individual therapy;physical therapy  -DB     Row Name 22 1152          General Information    Patient Profile Reviewed yes  -DB     Prior Level of Function --  arrives from SNF, prior to recent hospital stay was (I) at home alone.  -DB     Existing Precautions/Restrictions fall  -DB     Barriers to Rehab none identified  -DB     Row Name 22 1155          Living Environment    People in Home other (see comments)  admitted from [facility], prior lives alone at home  -DB     Row Name 22 1155          Home Main Entrance    Number of Stairs, Main Entrance three  -DB     Stair  Railings, Main Entrance railings safe and in good condition  -DB     Row Name 07/06/22 1155          Stairs Within Home, Primary    Number of Stairs, Within Home, Primary none  -DB     Row Name 07/06/22 1155          Cognition    Orientation Status (Cognition) oriented x 3  -DB     Row Name 07/06/22 1155          Safety Issues, Functional Mobility    Impairments Affecting Function (Mobility) balance;strength;endurance/activity tolerance  -DB           User Key  (r) = Recorded By, (t) = Taken By, (c) = Cosigned By    Initials Name Provider Type    DB Mari Huynh PT Physical Therapist               Mobility     Row Name 07/06/22 1158          Bed Mobility    Bed Mobility supine-sit;sit-supine;scooting/bridging  -DB     Scooting/Bridging [place] (Bed Mobility) standby assist;verbal cues  -DB     Supine-Sit [place] (Bed Mobility) standby assist;verbal cues  -DB     Sit-Supine [place] (Bed Mobility) standby assist;verbal cues  -DB     Assistive Device (Bed Mobility) bed rails;head of bed elevated  -DB     Row Name 07/06/22 1158          Sit-Stand Transfer    Sit-Stand [place] (Transfers) contact guard;verbal cues  -DB     Assistive Device (Sit-Stand Transfers) walker, front-wheeled  -DB     Row Name 07/06/22 1158          Gait/Stairs (Locomotion)    [place] Level (Gait) minimum assist (75% patient effort);verbal cues;nonverbal cues (demo/gesture)  -DB     Assistive Device (Gait) walker, front-wheeled  -DB     Distance in Feet (Gait) 3' side steps to HOB  -DB     Deviations/Abnormal Patterns (Gait) weight shifting decreased  -DB     Bilateral Gait Deviations forward flexed posture;heel strike decreased  -DB     Comment, (Gait/Stairs) posterior lean upon standing and taking steps  -DB           User Key  (r) = Recorded By, (t) = Taken By, (c) = Cosigned By    Initials Name Provider Type    Mari Lynne PT Physical Therapist               Obj/Interventions     Row Name 07/06/22 115           Range of Motion Comprehensive    General Range of Motion no range of motion deficits identified  -DB     Row Name 07/06/22 1159          Strength Comprehensive (MMT)    Comment, General Manual Muscle Testing (MMT) Assessment generalized weakness  -DB     Row Name 07/06/22 1159          Balance    Balance Assessment sitting static balance;sitting dynamic balance;standing static balance;standing dynamic balance  -DB     Static Sitting Balance standby assist  -DB     Dynamic Sitting Balance standby assist  -DB     Position, Sitting Balance unsupported;sitting edge of bed  -DB     Static Standing Balance contact guard  -DB     Dynamic Standing Balance minimal assist  -DB     Position/Device Used, Standing Balance supported;walker, front-wheeled  -DB     Balance Interventions sitting;standing;sit to stand  -DB           User Key  (r) = Recorded By, (t) = Taken By, (c) = Cosigned By    Initials Name Provider Type    DB Mari Huynh, PT Physical Therapist               Goals/Plan     Row Name 07/06/22 1202          Bed Mobility Goal 1 (PT)    Activity/Assistive Device (Bed Mobility Goal 1, PT) bed mobility activities, all  -DB     [place] Level/Cues Needed (Bed Mobility Goal 1, PT) supervision required  -DB     Time Frame (Bed Mobility Goal 1, PT) 1 week  -DB     Row Name 07/06/22 1202          Transfer Goal 1 (PT)    Activity/Assistive Device (Transfer Goal 1, PT) transfers, all  -DB     [place] Level/Cues Needed (Transfer Goal 1, PT) supervision required  -DB     Time Frame (Transfer Goal 1, PT) 1 week  -DB     Row Name 07/06/22 1202          Gait Training Goal 1 (PT)    Activity/Assistive Device (Gait Training Goal 1, PT) gait (walking locomotion)  -DB     Pope Level (Gait Training Goal 1, PT) standby assist  -DB     Time Frame (Gait Training Goal 1, PT) 1 week  -DB     Row Name 07/06/22 1202          Therapy Assessment/Plan (PT)    Planned Therapy Interventions (PT) balance training;bed  mobility training;gait training;home exercise program;patient/family education;neuromuscular re-education;postural re-education;stair training;strengthening;transfer training  -DB           User Key  (r) = Recorded By, (t) = Taken By, (c) = Cosigned By    Initials Name Provider Type    Mari Lynne PT Physical Therapist               Clinical Impression     Row Name 07/06/22 115          Pain    Pain Intervention(s) Ambulation/increased activity;Repositioned  -DB     Row Name 07/06/22 1152          Plan of Care Review    Plan of Care Reviewed With patient  -DB     Outcome Evaluation Pt is a 89 y/o F admitted to [facility] from [place] d/t constipation. Prior to rehab, was (I) at home alone, 3 KWAME, no AD, and uses rollator at baseline. Pt agreeable to work with PT today, although states she does not want to get OOB. Pt performs bed mobility with SBA, able to stand with CGA to RW, and req's Anselmo to take a few side steps towards HOB. Pt demo's posterior lean and difficulty with weight shift when trying to ambulate. Deficits include dec'd strength, endurance, and balance. Pt will benefit from skilled PT intervention. Rec D/C back to SNF.  -DB     Row Name 07/06/22 1153          Therapy Assessment/Plan (PT)    Rehab Potential (PT) good, to achieve stated therapy goals  -DB     Criteria for Skilled Interventions Met (PT) yes  -DB     Therapy Frequency (PT) 6 times/wk  -DB     Row Name 07/06/22 1159          Vital Signs    O2 Delivery Pre Treatment room air  -DB     O2 Delivery Intra Treatment room air  -DB     O2 Delivery Post Treatment room air  -DB     Pre Patient Position Supine  -DB     Intra Patient Position Standing  -DB     Post Patient Position Supine  -DB     Row Name 07/06/22 1159          Positioning and Restraints    Pre-Treatment Position in bed  -DB     Post Treatment Position bed  -DB     In Bed supine;call light within reach;encouraged to call for assist;exit alarm on;with family/caregiver  -DB            User Key  (r) = Recorded By, (t) = Taken By, (c) = Cosigned By    Initials Name Provider Type    DB Mari Huynh PT Physical Therapist               Outcome Measures     Row Name 07/06/22 1203          How much help from another person do you currently need...    Turning from your back to your side while in flat bed without using bedrails? 4  -DB     Moving from lying on back to sitting on the side of a flat bed without bedrails? 3  -DB     Moving to and from a bed to a chair (including a wheelchair)? 3  -DB     Standing up from a chair using your arms (e.g., wheelchair, bedside chair)? 3  -DB     Climbing 3-5 steps with a railing? 2  -DB     To walk in hospital room? 2  -DB     AM-PAC 6 Clicks Score (PT) 17  -DB     Highest level of mobility 5 --> Static standing  -DB     Row Name 07/06/22 1203          Functional Assessment    Outcome Measure Options AM-PAC 6 Clicks Basic Mobility (PT)  -DB           User Key  (r) = Recorded By, (t) = Taken By, (c) = Cosigned By    Initials Name Provider Type    DB Mari Huynh PT Physical Therapist                             Physical Therapy Education                 Title: PT OT SLP Therapies (Done)     Topic: Physical Therapy (Done)     Point: Mobility training (Done)     Learning Progress Summary           Patient Acceptance, E, VU,NR by DB at 7/6/2022 1203                   Point: Home exercise program (Done)     Learning Progress Summary           Patient Acceptance, E, VU,NR by DB at 7/6/2022 1203                   Point: Body mechanics (Done)     Learning Progress Summary           Patient Acceptance, E, VU,NR by DB at 7/6/2022 1203                   Point: Precautions (Done)     Learning Progress Summary           Patient Acceptance, E, VU,NR by DB at 7/6/2022 1203                               User Key     Initials Effective Dates Name Provider Type Discipline    DB 06/16/21 -  Mari Huynh PT Physical Therapist PT              PT Recommendation and  Plan  Planned Therapy Interventions (PT): balance training, bed mobility training, gait training, home exercise program, patient/family education, neuromuscular re-education, postural re-education, stair training, strengthening, transfer training  Plan of Care Reviewed With: patient  Outcome Evaluation: Pt is a 89 y/o F admitted to [facility] from [place] d/t constipation. Prior to rehab, was (I) at home alone, 3 KWAME, no AD, and uses rollator at baseline. Pt agreeable to work with PT today, although states she does not want to get OOB. Pt performs bed mobility with SBA, able to stand with CGA to RW, and req's Anselmo to take a few side steps towards HOB. Pt demo's posterior lean and difficulty with weight shift when trying to ambulate. Deficits include dec'd strength, endurance, and balance. Pt will benefit from skilled PT intervention. Rec D/C back to SNF.     Time Calculation:    PT Charges     Row Name 07/06/22 1204             Time Calculation    Start Time 1049  -DB      Stop Time 1101  -DB      Time Calculation (min) 12 min  -DB      PT Received On 07/06/22  -DB      PT - Next Appointment 07/07/22  -DB      PT Goal Re-Cert Due Date 07/13/22  -DB              Time Calculation- PT    Total Timed Code Minutes- PT 8 minute(s)  -DB            User Key  (r) = Recorded By, (t) = Taken By, (c) = Cosigned By    Initials Name Provider Type    DB Mari Huynh, PT Physical Therapist              Therapy Charges for Today     Code Description Service Date Service Provider Modifiers Qty    26077114991  PT EVAL MOD COMPLEXITY 2 7/6/2022 Mari Huynh, PT GP 1    [id number]  PT THERAPEUTIC ACT EA 15 MIN 7/6/2022 Mari Huynh, PT GP 1          PT G-Codes  Outcome Measure Options: AM-PAC 6 Clicks Basic Mobility (PT)  AM-PAC 6 Clicks Score (PT): 17    Mari Huynh PT  7/6/2022

## 2022-07-07 NOTE — PROGRESS NOTES
"Enter Query Response Below      Query Response:     UTI was not due to Bhardwaj. It was just placed a short time prior to admission.         If applicable, please update the problem list.         Patient: Sharonda Garcia        : 1930  Account: [id number]           Admit Date: 2022        Options to Respond to Query:    1. Access the Encounter     a. From the To-Do Side bar, click Respond With Note.     b. Click New Note     c. Answer query within the yellow box.                d. Update the Problem List if applicable.     Dr. Polk,     89 yr old female admitted with fecal impaction related to anal stenosis. Patient has documented urinary tract infection on admission. Your discharge summary documents \"She also had been experiencing urine retention and had a Bhardwaj catheter placed before being transferred to the ED here.\" Urine culture showing >100,000 CFU/mL Gram Negative Bacilli. The patient was being treated with Rocephin IV then changed to Omnicef PO at discharge and bhardwaj catheter removed.     Based on the information, can you further specify the urinary tract infection as:    - Urinary tract infection due to indwelling bhardwaj catheter  - Urinary tract infection unrelated to indwelling bhardwaj catheter  - Other (please specify)  ___________________  - Unable to clinically determine    By submitting this query, we are merely seeking further clarification of documentation to accurately reflect all conditions that you are monitoring, evaluating, treating or that extend the hospitalization or utilize additional resources of care. Please utilize your independent clinical judgment when addressing the question(s) above.     This query and your response, once completed, will be entered into the legal medical record.    Sincerely,  [email or web address]  Clinical Documentation Integrity Program   "

## 2022-08-17 ENCOUNTER — PATIENT OUTREACH (OUTPATIENT)
Dept: CASE MANAGEMENT | Facility: OTHER | Age: 87
End: 2022-08-17

## 2022-08-17 ENCOUNTER — TELEPHONE (OUTPATIENT)
Dept: INTERNAL MEDICINE | Age: 87
End: 2022-08-17

## 2022-08-17 NOTE — OUTREACH NOTE
AMBULATORY CASE MANAGEMENT NOTE    Name and Relationship of Patient/Support Person: SARAHRONSUZIECHIQUI - Emergency Contact    Patient Outreach  Incoming call from patient's relative, Ms. Ridley that patient passed away on 8/8/22 at [facility]. RN-ACM expressed condolences for loss and  Ms. Ridley requested RN-ACM notify PCP office.   Care Coordination  Care Coordination with PCP office/[facility] of patient passing away on 8/8/22 and states will route information to PCP.   RAMBO WOLFF  Ambulatory Case Management    8/17/2022, 10:51 EDT

## 2022-08-17 NOTE — TELEPHONE ENCOUNTER
“Please be informed that patient has passed. Patient has been marked  in the system. The date of death is: 22    Caller: RAMBO    Relationship: NURSE  AT [facility]      Best call back number: [phone]      SHE WAS ASKED BY FAMILY TO LET PCP KNOW ABOUT PATIENT BEING .

## 2023-01-28 NOTE — TELEPHONE ENCOUNTER
PATIENT STATES SHE STOPPED BY MICHAEL TO  HER NASAL SPRAY THAT WAS RECOMMENDED TO HER AT HER LAST VISIT WITH [facility] IGLES[facility]. SHE STATES SHE REFUSED IT BECAUSE IT WOULD COST HER AN ADDITIONAL $68. SHE WOULD JUST LIKE TO CONTINUE WITH THE PRESENT NASAL SPRAY (BEFORE THE RECOMMENDATION).    PLEASE ADVISE: [phone]   Positive